# Patient Record
Sex: FEMALE | URBAN - METROPOLITAN AREA
[De-identification: names, ages, dates, MRNs, and addresses within clinical notes are randomized per-mention and may not be internally consistent; named-entity substitution may affect disease eponyms.]

---

## 2018-09-17 ENCOUNTER — APPOINTMENT (RX ONLY)
Dept: URBAN - METROPOLITAN AREA CLINIC 54 | Facility: CLINIC | Age: 33
Setting detail: DERMATOLOGY
End: 2018-09-17

## 2018-09-17 DIAGNOSIS — Q826 OTHER SPECIFIED ANOMALIES OF SKIN: ICD-10-CM

## 2018-09-17 DIAGNOSIS — Q819 OTHER SPECIFIED ANOMALIES OF SKIN: ICD-10-CM

## 2018-09-17 DIAGNOSIS — Q828 OTHER SPECIFIED ANOMALIES OF SKIN: ICD-10-CM

## 2018-09-17 DIAGNOSIS — D485 NEOPLASM OF UNCERTAIN BEHAVIOR OF SKIN: ICD-10-CM

## 2018-09-17 PROBLEM — D48.5 NEOPLASM OF UNCERTAIN BEHAVIOR OF SKIN: Status: ACTIVE | Noted: 2018-09-17

## 2018-09-17 PROBLEM — Q82.8 OTHER SPECIFIED CONGENITAL MALFORMATIONS OF SKIN: Status: ACTIVE | Noted: 2018-09-17

## 2018-09-17 PROBLEM — L70.0 ACNE VULGARIS: Status: ACTIVE | Noted: 2018-09-17

## 2018-09-17 PROCEDURE — ? COUNSELING

## 2018-09-17 PROCEDURE — 99203 OFFICE O/P NEW LOW 30 MIN: CPT

## 2018-09-17 PROCEDURE — ? DEFER

## 2018-09-17 PROCEDURE — ? RECOMMENDATIONS

## 2018-09-17 PROCEDURE — ? OTHER

## 2018-09-17 ASSESSMENT — LOCATION SIMPLE DESCRIPTION DERM
LOCATION SIMPLE: RIGHT SHOULDER
LOCATION SIMPLE: LEFT UPPER ARM
LOCATION SIMPLE: RIGHT UPPER ARM
LOCATION SIMPLE: LEFT CHEEK
LOCATION SIMPLE: LEFT CHEEK

## 2018-09-17 ASSESSMENT — LOCATION DETAILED DESCRIPTION DERM
LOCATION DETAILED: LEFT ANTERIOR PROXIMAL UPPER ARM
LOCATION DETAILED: RIGHT ANTERIOR PROXIMAL UPPER ARM
LOCATION DETAILED: RIGHT ANTERIOR SHOULDER
LOCATION DETAILED: LEFT INFERIOR MEDIAL MALAR CHEEK
LOCATION DETAILED: LEFT INFERIOR CENTRAL MALAR CHEEK
LOCATION DETAILED: LEFT INFERIOR MEDIAL MALAR CHEEK

## 2018-09-17 ASSESSMENT — LOCATION ZONE DERM
LOCATION ZONE: FACE
LOCATION ZONE: ARM
LOCATION ZONE: FACE

## 2018-09-17 NOTE — PROCEDURE: DEFER
Procedure To Be Performed At Next Visit: Biopsy by shave method
Detail Level: Detailed
Instructions (Optional): Left Superior Nasolabial fold  Size 0.3x0.3cm  Erythematous Papule R/O DN \\nLeft Medial Cheek 0.4x0.4cm Erythematous Papule R/O PG

## 2018-09-17 NOTE — HPI: SKIN LESION
How Severe Is Your Skin Lesion?: moderate
Has Your Skin Lesion Been Treated?: not been treated
Is This A New Presentation, Or A Follow-Up?: Growths
Which Family Member (Optional)?: Brother

## 2018-09-17 NOTE — PROCEDURE: OTHER
Detail Level: Zone
Note Text (......Xxx Chief Complaint.): This diagnosis correlates with the
Other (Free Text): Patient is currently pregnant, will do biopsy in 2 months after she delivers

## 2018-12-10 ENCOUNTER — APPOINTMENT (RX ONLY)
Dept: URBAN - METROPOLITAN AREA CLINIC 54 | Facility: CLINIC | Age: 33
Setting detail: DERMATOLOGY
End: 2018-12-10

## 2018-12-10 DIAGNOSIS — D485 NEOPLASM OF UNCERTAIN BEHAVIOR OF SKIN: ICD-10-CM

## 2018-12-10 DIAGNOSIS — D22 MELANOCYTIC NEVI: ICD-10-CM

## 2018-12-10 PROBLEM — D22.9 MELANOCYTIC NEVI, UNSPECIFIED: Status: ACTIVE | Noted: 2018-12-10

## 2018-12-10 PROBLEM — D48.5 NEOPLASM OF UNCERTAIN BEHAVIOR OF SKIN: Status: ACTIVE | Noted: 2018-12-10

## 2018-12-10 PROCEDURE — ? BIOPSY BY SHAVE METHOD

## 2018-12-10 PROCEDURE — 11100: CPT

## 2018-12-10 PROCEDURE — ? COUNSELING

## 2018-12-10 PROCEDURE — 11101: CPT

## 2018-12-10 PROCEDURE — 99213 OFFICE O/P EST LOW 20 MIN: CPT | Mod: 25

## 2018-12-10 ASSESSMENT — LOCATION DETAILED DESCRIPTION DERM
LOCATION DETAILED: LEFT INFERIOR MEDIAL MALAR CHEEK
LOCATION DETAILED: LEFT INFERIOR CENTRAL MALAR CHEEK

## 2018-12-10 ASSESSMENT — LOCATION SIMPLE DESCRIPTION DERM: LOCATION SIMPLE: LEFT CHEEK

## 2018-12-10 ASSESSMENT — LOCATION ZONE DERM: LOCATION ZONE: FACE

## 2018-12-10 NOTE — PROCEDURE: MIPS QUALITY
Detail Level: Detailed
Quality 402: Tobacco Use And Help With Quitting Among Adolescents: Patient screened for tobacco and never smoked
Quality 110: Preventive Care And Screening: Influenza Immunization: Influenza Immunization not Administered due to Patient Allergy.
Quality 431: Preventive Care And Screening: Unhealthy Alcohol Use - Screening: Patient screened for unhealthy alcohol use using a single question and scores less than 2 times per year

## 2018-12-10 NOTE — PROCEDURE: BIOPSY BY SHAVE METHOD
Cryotherapy Text: The wound bed was treated with cryotherapy after the biopsy was performed.
Depth Of Biopsy: dermis
Wound Care: Bacitracin
Lab: -[id number]
Size Of Lesion In Cm: 0.3
Consent: Written consent was obtained and risks were reviewed including but not limited to scarring, infection, bleeding, scabbing, incomplete removal, nerve damage and allergy to anesthesia.
Render Post-Care Instructions In Note?: no
Anesthesia Volume In Cc (Will Not Render If 0): 1
Biopsy Type: H and E
Body Location Override (Optional - Billing Will Still Be Based On Selected Body Map Location If Applicable): left Superior nasolabial fold
Electrodesiccation Text: The wound bed was treated with electrodesiccation after the biopsy was performed.
Type Of Destruction Used: Curettage
Electrodesiccation And Curettage Text: The wound bed was treated with electrodesiccation and curettage after the biopsy was performed.
Path Notes (To The Dermatopathologist): 0. 3x0.3cm  erythematous papule R/O DN
Billing Type: [employer]
Detail Level: Detailed
Dressing: bandage
Was A Bandage Applied: Yes
Hemostasis: Electrocautery
Biopsy Method: Dermablade
Curettage Text: The wound bed was treated with curettage after the biopsy was performed.
Post-Care Instructions: I reviewed with the patient in detail post-care instructions. Patient is to keep the biopsy site dry overnight, and then apply bacitracin twice daily until healed. Patient may apply hydrogen peroxide soaks to remove any crusting.
Silver Nitrate Text: The wound bed was treated with silver nitrate after the biopsy was performed.
Anesthesia Type: 1% lidocaine with epinephrine
Notification Instructions: Patient will be notified of biopsy results. However, patient instructed to call the office if not contacted within 2 weeks.
Additional Anesthesia Volume In Cc (Will Not Render If 0): 0
Body Location Override (Optional - Billing Will Still Be Based On Selected Body Map Location If Applicable): left Medial cheek
Path Notes (To The Dermatopathologist): 0.4x0.4cm erythematous papule R/O PG
Billing Type: Third-Party Bill
Size Of Lesion In Cm: 0.4

## 2021-04-23 ENCOUNTER — OFFICE VISIT (OUTPATIENT)
Dept: PRIMARY CARE CLINIC | Age: 36
End: 2021-04-23
Payer: MEDICAID

## 2021-04-23 VITALS
WEIGHT: 254 LBS | BODY MASS INDEX: 38.49 KG/M2 | SYSTOLIC BLOOD PRESSURE: 130 MMHG | DIASTOLIC BLOOD PRESSURE: 78 MMHG | HEIGHT: 68 IN | TEMPERATURE: 98 F | OXYGEN SATURATION: 97 % | HEART RATE: 86 BPM

## 2021-04-23 DIAGNOSIS — E03.9 HYPOTHYROIDISM, UNSPECIFIED TYPE: Primary | ICD-10-CM

## 2021-04-23 PROCEDURE — 99213 OFFICE O/P EST LOW 20 MIN: CPT | Performed by: PHYSICIAN ASSISTANT

## 2021-04-23 RX ORDER — LEVOTHYROXINE SODIUM 0.07 MG/1
75 TABLET ORAL DAILY
Qty: 30 TABLET | Refills: 0 | Status: SHIPPED | OUTPATIENT
Start: 2021-04-23 | End: 2021-05-13

## 2021-04-23 RX ORDER — LEVOTHYROXINE SODIUM 0.07 MG/1
75 TABLET ORAL DAILY
COMMUNITY
End: 2021-04-23

## 2021-04-23 RX ORDER — VENLAFAXINE HYDROCHLORIDE 75 MG/1
CAPSULE, EXTENDED RELEASE ORAL
COMMUNITY
Start: 2021-04-12 | End: 2021-07-13 | Stop reason: SDUPTHER

## 2021-04-23 ASSESSMENT — ENCOUNTER SYMPTOMS
GASTROINTESTINAL NEGATIVE: 1
RESPIRATORY NEGATIVE: 1

## 2021-04-23 NOTE — PROGRESS NOTES
[address]   [address] [place] [address]  Phone: [phone]  Fax: [phone]       [facility] Name: Zulay Wilder  MRN: [id number]  [place] 1985  Date of evaluation: 4/23/2021  Provider: Susan Mccracken PA-C     CHIEF COMPLAINT       Chief Complaint   Patient presents with    Hypothyroidism     Needs Levothyroxine refilled-  75mcg daily           HISTORY OF PRESENT ILLNESS  (Location/Symptom, Timing/Onset, Context/Setting, Quality, Duration, Modifying Factors, Severity.)   Zulay Wilder is a 29 y.o. White [1] female who presents to the office for evaluation of      Medication refill. Patient is transitioning care and establishing care with Dr. Luis Felipe Campuzano on 4/28/21. Patient is currently out of thyroid medication. Patient states that prior to this the medication and dose seems to be effective. Nursing Notes were reviewed. REVIEW OF SYSTEMS    (2-9 systems for level 4, 10 or more for level 5)     Review of Systems   Constitutional: Positive for fatigue. HENT: Negative. Respiratory: Negative. Cardiovascular: Negative. Gastrointestinal: Negative. Genitourinary: Negative. Musculoskeletal:        Feels \"Achey\" due to being off thyroid medication. Except as noted above the remainder of the review of systems was reviewed andnegative. PAST MEDICAL HISTORY   History reviewed. No past medical history on file. SURGICAL HISTORY     History reviewed. No past surgical history on file. CURRENT MEDICATIONS       Current Outpatient Medications   Medication Sig Dispense Refill    venlafaxine (EFFEXOR XR) 75 MG extended release capsule TAKE OEN CAPSULE BY MOUTH WITH FOOD ONCE DAILY      levothyroxine (SYNTHROID) 75 MCG tablet Take 1 tablet by mouth daily 30 tablet 0     No current facility-administered medications for this visit. ALLERGIES     Shellfish-derived products    FAMILY HISTORY     No family history on file.   No family status information on file. SOCIAL HISTORY      reports that she has never smoked. She has never used smokeless tobacco.      PHYSICAL EXAM    (up to 7 for level 4, 8 or more for level 5)     Vitals:    04/23/21 1601   BP: 130/78   Site: Left Upper Arm   Position: Supine   Cuff Size: Large Adult   Pulse: 86   Temp: 98 °F (36.7 °C)   SpO2: 97%   Weight: 254 lb (115.2 kg)   Height: 5' 8\" (1.727 m)         Physical Exam  Vitals signs and nursing note reviewed. Constitutional:       Appearance: Normal appearance. She is normal weight. HENT:      Head: Normocephalic and atraumatic. Right Ear: External ear normal.      Left Ear: External ear normal.      Nose: Nose normal.      Mouth/Throat:      Mouth: Mucous membranes are moist.   Eyes:      Extraocular Movements: Extraocular movements intact. Conjunctiva/sclera: Conjunctivae normal.      Pupils: Pupils are equal, round, and reactive to light. Cardiovascular:      Rate and Rhythm: Normal rate. Pulmonary:      Effort: Pulmonary effort is normal.   Abdominal:      Palpations: Abdomen is soft. Skin:     General: Skin is warm and dry. Neurological:      Mental Status: She is alert and oriented to person, place, and time. DIFFERENTIAL DIAGNOSIS:       Cathie York reviewed the disposition diagnosis with the patient and or their family/guardian. I have answered their questions and given discharge instructions. They voiced understanding of these instructions and did not have anyfurther questions or complaints. PROCEDURES:  No orders of the defined types were placed in this encounter. No results found for this visit on 04/23/21.     FINALIMPRESSION      Visit Diagnoses and Associated Orders     Hypothyroidism, unspecified type    -  Primary         ORDERS WITHOUT AN ASSOCIATED DIAGNOSIS    venlafaxine (EFFEXOR XR) 75 MG extended release capsule [56668]      levothyroxine (SYNTHROID) 75 MCG tablet [4422]              PLAN Return if symptoms worsen or fail to improve. DISCHARGEMEDICATIONS:  Orders Placed This Encounter   Medications    levothyroxine (SYNTHROID) 75 MCG tablet     Sig: Take 1 tablet by mouth daily     Dispense:  30 tablet     Refill:  0         Plan:  1. Patient to take medication as prescribed  2. Patient to establish care with Dr. Windsor Brunner on 04/28/2021  3. Patient instructed to return to the office if symptoms worsen, return, or have any other concerns. 4. Patient understands and is agreeable.          Nato Howell PA-C 4/23/2021 5:26 PM

## 2021-04-23 NOTE — PATIENT INSTRUCTIONS
Patient Education        Hypothyroidism: Care Instructions  Your Care Instructions     When you have hypothyroidism, your body doesn't make enough thyroid hormone. This hormone helps your body use energy. If your thyroid level is low, you may feel tired, be constipated, have an increase in your blood pressure, or have dry skin or memory problems. You may also get cold easily, even when it is warm. Women with low thyroid levels may have heavy menstrual periods. A blood test to find your thyroid-stimulating hormone (TSH) level is used to check for hypothyroidism. A high TSH level may mean that you have it. The treatment for hypothyroidism is thyroid hormone pills. You should start to feel better in 1 to 2 weeks. Most people need treatment for the rest of their lives. You will need regular visits with your doctor to make sure you are doing well and that you have the right dose of medicine. Follow-up care is a key part of your treatment and safety. Be sure to make and go to all appointments, and call your doctor if you are having problems. It's also a good idea to know your test results and keep a list of the medicines you take. How can you care for yourself at home? · Take your thyroid hormone medicine exactly as prescribed. Call your doctor if you think you are having a problem with your medicine. Most people do not have side effects if they take the right amount of medicine regularly. ? Take the medicine 30 minutes before breakfast, and do not take it with calcium, vitamins, or iron. ? Do not take extra doses of your thyroid medicine. It will not help you get better any faster, and it may cause side effects. ? If you forget to take a dose, do NOT take a double dose of medicine. Take your usual dose the next day. · Tell your doctor about all prescription, herbal, or over-the-counter products you take. · Take care of yourself. Eat a healthy diet, get enough sleep, and get regular exercise.   When should you call for help? Call 911 anytime you think you may need emergency care. For example, call if:    · You passed out (lost consciousness).     · You have severe trouble breathing.     · You have a very slow heartbeat (less than 60 beats a minute).     · You have a low body temperature (95°F or below). Call your doctor now or seek immediate medical care if:    · You feel tired, sluggish, or weak.     · You have trouble remembering things or concentrating.     · You do not begin to feel better 2 weeks after starting your medicine. Watch closely for changes in your health, and be sure to contact your doctor if you have any problems. Where can you learn more? Go to https://[employer]. org and sign in to your [employer] account. Enter R840 in the [employer] box to learn more about \"Hypothyroidism: Care Instructions. \"     If you do not have an account, please click on the \"Sign Up Now\" link. Current as of: March 31, 2020               Content Version: 12.8  © 5324-1961 Healthwise, Incorporated. Care instructions adapted under license by [facility] [place] ([facility]). If you have questions about a medical condition or this instruction, always ask your healthcare professional. [address] any warranty or liability for your use of this information.

## 2021-04-30 ENCOUNTER — TELEPHONE (OUTPATIENT)
Dept: FAMILY MEDICINE CLINIC | Age: 36
End: 2021-04-30

## 2021-05-07 ENCOUNTER — HOSPITAL ENCOUNTER (EMERGENCY)
Age: 36
Discharge: HOME | End: 2021-05-07
Attending: EMERGENCY MEDICINE
Payer: MEDICAID

## 2021-05-07 VITALS
WEIGHT: 250 LBS | DIASTOLIC BLOOD PRESSURE: 90 MMHG | BODY MASS INDEX: 37.89 KG/M2 | HEIGHT: 68 IN | RESPIRATION RATE: 16 BRPM | SYSTOLIC BLOOD PRESSURE: 153 MMHG | HEART RATE: 65 BPM | OXYGEN SATURATION: 100 % | TEMPERATURE: 97.8 F

## 2021-05-07 DIAGNOSIS — N92.0 MENORRHAGIA WITH REGULAR CYCLE: Primary | ICD-10-CM

## 2021-05-07 LAB
-: ABNORMAL
ABSOLUTE EOS #: 0.3 K/UL (ref 0–0.4)
ABSOLUTE IMMATURE GRANULOCYTE: ABNORMAL K/UL (ref 0–0.3)
ABSOLUTE LYMPH #: 3.4 K/UL (ref 1–4.8)
ABSOLUTE MONO #: 0.6 K/UL (ref 0.1–1.2)
AMORPHOUS: ABNORMAL
BACTERIA: ABNORMAL
BASOPHILS # BLD: 1 % (ref 0–2)
BASOPHILS ABSOLUTE: 0.1 K/UL (ref 0–0.2)
BILIRUBIN URINE: NEGATIVE
CASTS UA: ABNORMAL /LPF
COLOR: YELLOW
COMMENT UA: ABNORMAL
CRYSTALS, UA: ABNORMAL /HPF
DIFFERENTIAL TYPE: ABNORMAL
EOSINOPHILS RELATIVE PERCENT: 3 % (ref 1–4)
EPITHELIAL CELLS UA: ABNORMAL /HPF (ref 0–5)
GLUCOSE URINE: NEGATIVE
HCG(URINE) PREGNANCY TEST: NEGATIVE
HCT VFR BLD CALC: 35.3 % (ref 36–46)
HEMOGLOBIN: 11.9 G/DL (ref 12–16)
IMMATURE GRANULOCYTES: ABNORMAL %
KETONES, URINE: NEGATIVE
LEUKOCYTE ESTERASE, URINE: NEGATIVE
LYMPHOCYTES # BLD: 34 % (ref 24–44)
MCH RBC QN AUTO: 28.6 PG (ref 26–34)
MCHC RBC AUTO-ENTMCNC: 33.6 G/DL (ref 31–37)
MCV RBC AUTO: 85 FL (ref 80–100)
MONOCYTES # BLD: 6 % (ref 2–11)
MUCUS: ABNORMAL
NITRITE, URINE: NEGATIVE
NRBC AUTOMATED: ABNORMAL PER 100 WBC
OTHER OBSERVATIONS UA: ABNORMAL
PDW BLD-RTO: 12.9 % (ref 12.5–15.4)
PH UA: 7 (ref 5–8)
PLATELET # BLD: 321 K/UL (ref 140–450)
PLATELET ESTIMATE: ABNORMAL
PMV BLD AUTO: 7.6 FL (ref 6–12)
PROTEIN UA: NEGATIVE
RBC # BLD: 4.16 M/UL (ref 4–5.2)
RBC # BLD: ABNORMAL 10*6/UL
RBC UA: ABNORMAL /HPF (ref 0–2)
RENAL EPITHELIAL, UA: ABNORMAL /HPF
SEG NEUTROPHILS: 56 % (ref 36–66)
SEGMENTED NEUTROPHILS ABSOLUTE COUNT: 5.7 K/UL (ref 1.8–7.7)
SPECIFIC GRAVITY UA: 1.01 (ref 1–1.03)
TRICHOMONAS: ABNORMAL
TURBIDITY: CLEAR
URINE HGB: ABNORMAL
UROBILINOGEN, URINE: NORMAL
WBC # BLD: 10.2 K/UL (ref 3.5–11)
WBC # BLD: ABNORMAL 10*3/UL
WBC UA: ABNORMAL /HPF (ref 0–5)
YEAST: ABNORMAL

## 2021-05-07 PROCEDURE — 36415 COLL VENOUS BLD VENIPUNCTURE: CPT

## 2021-05-07 PROCEDURE — 85025 COMPLETE CBC W/AUTO DIFF WBC: CPT

## 2021-05-07 PROCEDURE — 81025 URINE PREGNANCY TEST: CPT

## 2021-05-07 PROCEDURE — 81001 URINALYSIS AUTO W/SCOPE: CPT

## 2021-05-07 PROCEDURE — 6370000000 HC RX 637 (ALT 250 FOR IP): Performed by: EMERGENCY MEDICINE

## 2021-05-07 PROCEDURE — 99285 EMERGENCY DEPT VISIT HI MDM: CPT

## 2021-05-07 RX ORDER — IBUPROFEN 600 MG/1
600 TABLET ORAL EVERY 6 HOURS PRN
Qty: 30 TABLET | Refills: 0 | Status: SHIPPED | OUTPATIENT
Start: 2021-05-07 | End: 2021-05-11

## 2021-05-07 RX ORDER — OXYCODONE HYDROCHLORIDE AND ACETAMINOPHEN 5; 325 MG/1; MG/1
1 TABLET ORAL ONCE
Status: COMPLETED | OUTPATIENT
Start: 2021-05-07 | End: 2021-05-07

## 2021-05-07 RX ADMIN — OXYCODONE HYDROCHLORIDE AND ACETAMINOPHEN 1 TABLET: 5; 325 TABLET ORAL at 20:44

## 2021-05-07 ASSESSMENT — PAIN SCALES - GENERAL
PAINLEVEL_OUTOF10: 4
PAINLEVEL_OUTOF10: 10
PAINLEVEL_OUTOF10: 10

## 2021-05-07 ASSESSMENT — PAIN DESCRIPTION - ORIENTATION: ORIENTATION: LEFT;RIGHT

## 2021-05-07 ASSESSMENT — ENCOUNTER SYMPTOMS
GASTROINTESTINAL NEGATIVE: 1
ALLERGIC/IMMUNOLOGIC NEGATIVE: 1
EYES NEGATIVE: 1
RESPIRATORY NEGATIVE: 1

## 2021-05-07 ASSESSMENT — PAIN DESCRIPTION - DESCRIPTORS: DESCRIPTORS: ACHING

## 2021-05-07 ASSESSMENT — PAIN DESCRIPTION - FREQUENCY: FREQUENCY: CONTINUOUS

## 2021-05-07 ASSESSMENT — PAIN - FUNCTIONAL ASSESSMENT: PAIN_FUNCTIONAL_ASSESSMENT: PREVENTS OR INTERFERES SOME ACTIVE ACTIVITIES AND ADLS

## 2021-05-07 ASSESSMENT — PAIN DESCRIPTION - PAIN TYPE: TYPE: ACUTE PAIN

## 2021-05-08 NOTE — ED PROVIDER NOTES
eMERGENCY dEPARTMENT eNCOUnter      Pt Name: Mamie Bloch  MRN: [id number]  [place] 1985  Date of evaluation: 5/7/2021      CHIEF COMPLAINT       Chief Complaint   Patient presents with    Vaginal Bleeding          HISTORY OF PRESENT ILLNESS    Mamie Bloch is a 29 y.o. female who presents emergency department for evaluation of cramping secondary to a heavy period. Patient states that she has been on her menses for this is the third day she states that she has been bleeding quite a bit she is going through about a pad an hour she states. Denies dizziness just does not feel well. Has no fevers no chills is not concerned about an STD. REVIEW OF SYSTEMS       Review of Systems   Constitutional: Negative. HENT: Negative. Eyes: Negative. Respiratory: Negative. Cardiovascular: Negative. Gastrointestinal: Negative. Endocrine: Negative. Genitourinary: Positive for menstrual problem. Musculoskeletal: Negative. Skin: Negative. Allergic/Immunologic: Negative. Neurological: Negative. Hematological: Negative. Psychiatric/Behavioral: Negative. PAST MEDICAL HISTORY    has a past medical history of Thyroid disease. SURGICAL HISTORY      has no past surgical history on file. CURRENT MEDICATIONS       Previous Medications    LEVOTHYROXINE (SYNTHROID) 75 MCG TABLET    Take 1 tablet by mouth daily    VENLAFAXINE (EFFEXOR XR) 75 MG EXTENDED RELEASE CAPSULE    TAKE OEN CAPSULE BY MOUTH WITH FOOD ONCE DAILY       ALLERGIES     is allergic to shellfish-derived products. FAMILY HISTORY     has no family status information on file. family history is not on file. SOCIAL HISTORY      reports that she has never smoked. She has never used smokeless tobacco. She reports previous alcohol use. She reports that she does not use drugs. PHYSICAL EXAM     INITIAL VITALS:  height is 5' 8\" (1.727 m) and weight is 113.4 kg (250 lb).  Her oral temperature is 97.7 °F (36.5 °C). Her blood pressure is 161/116 (abnormal) and her pulse is 66. Her respiration is 18 and oxygen saturation is 100%. Constitutional: Alert, oriented x3, nontoxic, afebrile, answering questions appropriately, acting properly for age, in no acute distress  HEENT: Extraocular muscles intact, mucus membranes moist, TMs clear bilaterally, no posterior pharyngeal erythema or exudates, Pupils equal, round, reactive to light,   Neck: Trachea midline, Supple without lymphadenopathy, no posterior midline neck tenderness to palpation  Cardiovascular: Regular rhythm and rate no S3, S4, or murmurs  Respiratory: Clear to auscultation bilaterally no wheezes, rhonchi, rales, no respiratory distress  Gastrointestinal: Soft, nontender, nondistended, positive bowel sounds. No rebound, rigidity, or guarding. Musculoskeletal: No extremity pain or swelling  Neurologic: Moving all 4 extremities without difficulty there are no gross focal neurologic deficits  Skin: Warm and dry        DIFFERENTIAL DIAGNOSIS/ MDM:     Most likely menorrhagia patient will get a CBC of pelvic exam urinalysis pregnancy test and be reevaluated. We will give her something for cramping as well.     DIAGNOSTIC RESULTS     EKG: All EKG's are interpreted by the Emergency Department Physician who either signs or Co-signs this chart in the absence of a cardiologist.        Not indicated unless otherwise documented above    LABS:  Results for orders placed or performed during the hospital encounter of 05/07/21   CBC Auto Differential   Result Value Ref Range    WBC 10.2 3.5 - 11.0 k/uL    RBC 4.16 4.0 - 5.2 m/uL    Hemoglobin 11.9 (L) 12.0 - 16.0 g/dL    Hematocrit 35.3 (L) 36 - 46 %    MCV 85.0 80 - 100 fL    MCH 28.6 26 - 34 pg    MCHC 33.6 31 - 37 g/dL    RDW 12.9 12.5 - 15.4 %    Platelets 691 609 - 482 k/uL    MPV 7.6 6.0 - 12.0 fL    NRBC Automated NOT REPORTED per 100 WBC    Differential Type NOT REPORTED     Seg Neutrophils 56 36 - 66 %    Lymphocytes 34 24 - 44 %    Monocytes 6 2 - 11 %    Eosinophils % 3 1 - 4 %    Basophils 1 0 - 2 %    Immature Granulocytes NOT REPORTED 0 %    Segs Absolute 5.70 1.8 - 7.7 k/uL    Absolute Lymph # 3.40 1.0 - 4.8 k/uL    Absolute Mono # 0.60 0.1 - 1.2 k/uL    Absolute Eos # 0.30 0.0 - 0.4 k/uL    Basophils Absolute 0.10 0.0 - 0.2 k/uL    Absolute Immature Granulocyte NOT REPORTED 0.00 - 0.30 k/uL    WBC Morphology NOT REPORTED     RBC Morphology NOT REPORTED     Platelet Estimate NOT REPORTED    Pregnancy, Urine   Result Value Ref Range    HCG(Urine) Pregnancy Test NEGATIVE NEGATIVE   Urinalysis Reflex to Culture    Specimen: Urine, clean catch   Result Value Ref Range    Color, UA YELLOW YELLOW    Turbidity UA CLEAR CLEAR    Glucose, Ur NEGATIVE NEGATIVE    Bilirubin Urine NEGATIVE NEGATIVE    Ketones, Urine NEGATIVE NEGATIVE    Specific Gravity, UA 1.015 1.005 - 1.030    Urine Hgb LARGE (A) NEGATIVE    pH, UA 7.0 5.0 - 8.0    Protein, UA NEGATIVE NEGATIVE    Urobilinogen, Urine Normal Normal    Nitrite, Urine NEGATIVE NEGATIVE    Leukocyte Esterase, Urine NEGATIVE NEGATIVE    Urinalysis Comments NOT REPORTED    Microscopic Urinalysis   Result Value Ref Range    -          WBC, UA 2 TO 5 0 - 5 /HPF    RBC, UA 10 TO 20 0 - 2 /HPF    Casts UA NOT REPORTED /LPF    Crystals, UA NOT REPORTED None /HPF    Epithelial Cells UA 2 TO 5 0 - 5 /HPF    Renal Epithelial, UA NOT REPORTED 0 /HPF    Bacteria, UA None None    Mucus, UA NOT REPORTED None    Trichomonas, UA NOT REPORTED None    Amorphous, UA NOT REPORTED None    Other Observations UA (A) NOT REQ. Utilizing a urinalysis as the only screening method to exclude a potential uropathogen can be unreliable in many patient populations. Rapid screening tests are less sensitive than culture and if UTI is a clinical possibility, culture should be considered despite a negative urinalysis.     Yeast, UA NOT REPORTED None       Not indicated unless otherwise documented above    RADIOLOGY:   I TO:  Gladys Villanueva MD  [address]  [phone]    In 2 days        DISCHARGE MEDICATIONS:  New Prescriptions    IBUPROFEN (ADVIL;MOTRIN) 600 MG TABLET    Take 1 tablet by mouth every 6 hours as needed for Pain       (Please note that portions of this note were completed with a voice recognition program.  Efforts were made to edit the dictations but occasionally words are mis-transcribed.)    Roman MD  Attending Emergency Physician           Leny Vee MD  05/07/21 5044

## 2021-05-10 ENCOUNTER — TELEPHONE (OUTPATIENT)
Dept: FAMILY MEDICINE CLINIC | Age: 36
End: 2021-05-10

## 2021-05-10 DIAGNOSIS — R10.2 PELVIC PAIN: Primary | ICD-10-CM

## 2021-05-10 DIAGNOSIS — N92.0 MENORRHAGIA WITH REGULAR CYCLE: ICD-10-CM

## 2021-05-10 NOTE — TELEPHONE ENCOUNTER
ED Follow up Call    Reason for ED visit:  pelvic pain, heavy bleeding  Status:     not changed    Did you call your PCP prior to going to the ED? Not Applicable      Did you receive a discharge instructions from the Emergency Room? Yes  Review of Instructions:     Understands what to report/when to return?:  Yes   Understands discharge instructions?:  Yes   Following discharge instructions?:  Yes   If not why? Are there any new complaints of pain? No  New Pain Meds? N/A    Constipation prophylaxis needed? N/A    If you have a wound is the dressing clean, dry, and intact? N/A  Understands wound care regimen? N/A    Are there any other complaints/concerns that you wish to tell your provider? Questioning fibroids    FU appts/Provider:    Future Appointments   Date Time Provider Brandon Smith   5/13/2021  8:30 AM MD Kailee Veronica [facility]           New Medications?:   No      Medication Reconciliation by phone - Yes  Understands Medications? Yes  Taking Medications? Yes  Can you swallow your pills? Yes    Any further needs in the home i.e. Equipment?   Not Applicable    Link to services in community?:  N/A   Which services:

## 2021-05-11 ENCOUNTER — OFFICE VISIT (OUTPATIENT)
Dept: FAMILY MEDICINE CLINIC | Age: 36
End: 2021-05-11
Payer: MEDICAID

## 2021-05-11 VITALS
HEIGHT: 68 IN | HEART RATE: 79 BPM | WEIGHT: 251 LBS | TEMPERATURE: 97.7 F | DIASTOLIC BLOOD PRESSURE: 82 MMHG | RESPIRATION RATE: 15 BRPM | OXYGEN SATURATION: 99 % | BODY MASS INDEX: 38.04 KG/M2 | SYSTOLIC BLOOD PRESSURE: 122 MMHG

## 2021-05-11 DIAGNOSIS — N93.9 ABNORMAL UTERINE BLEEDING: Primary | ICD-10-CM

## 2021-05-11 DIAGNOSIS — N92.0 MENORRHAGIA WITH REGULAR CYCLE: ICD-10-CM

## 2021-05-11 DIAGNOSIS — R10.2 PELVIC PAIN: ICD-10-CM

## 2021-05-11 PROCEDURE — 99203 OFFICE O/P NEW LOW 30 MIN: CPT | Performed by: STUDENT IN AN ORGANIZED HEALTH CARE EDUCATION/TRAINING PROGRAM

## 2021-05-11 RX ORDER — IBUPROFEN 800 MG/1
800 TABLET ORAL 2 TIMES DAILY PRN
Qty: 60 TABLET | Refills: 0 | Status: SHIPPED | OUTPATIENT
Start: 2021-05-11 | End: 2022-10-14 | Stop reason: SDUPTHER

## 2021-05-11 RX ORDER — TRAMADOL HYDROCHLORIDE 50 MG/1
50 TABLET ORAL EVERY 6 HOURS PRN
Qty: 28 TABLET | Refills: 0 | Status: SHIPPED | OUTPATIENT
Start: 2021-05-11 | End: 2021-05-18

## 2021-05-11 ASSESSMENT — ENCOUNTER SYMPTOMS
COUGH: 0
BACK PAIN: 0
SORE THROAT: 0
WHEEZING: 0
CONSTIPATION: 0
DIARRHEA: 0
ABDOMINAL PAIN: 0
SHORTNESS OF BREATH: 0
CHEST TIGHTNESS: 0
ABDOMINAL DISTENTION: 0

## 2021-05-11 ASSESSMENT — PATIENT HEALTH QUESTIONNAIRE - PHQ9
SUM OF ALL RESPONSES TO PHQ QUESTIONS 1-9: 0
1. LITTLE INTEREST OR PLEASURE IN DOING THINGS: 0
SUM OF ALL RESPONSES TO PHQ9 QUESTIONS 1 & 2: 0
2. FEELING DOWN, DEPRESSED OR HOPELESS: 0

## 2021-05-11 NOTE — PROGRESS NOTES
2021    Tonya Wall (:  1985) is a 29 y.o. female, here for evaluation of the following medical concerns:    HPI: Dysfunctional Uterine Bleeding: Patient complains of painful menses. She had been bleeding irregularly. She is now bleeding every 25 day and menses are lasting 4 days. Pad or tampon count: changes every 3 hours. Clots of size 4 cm. Dysmenorrhea: moderate, occurring throughout menses. Cyclic symptoms include bloating, fluid retention, pelvic pain and weight gain. Current contraception: none. History of infertility: no. History of abnormal Pap smear: no.    Review of Systems   Constitutional: Negative for chills, fatigue and fever. HENT: Negative for congestion, postnasal drip and sore throat. Eyes: Negative for visual disturbance. Respiratory: Negative for cough, chest tightness, shortness of breath and wheezing. Cardiovascular: Negative. Gastrointestinal: Negative for abdominal distention, abdominal pain, constipation and diarrhea. Genitourinary: Positive for menstrual problem, pelvic pain and vaginal bleeding. Negative for difficulty urinating, dysuria, frequency and urgency. Musculoskeletal: Negative for arthralgias, back pain and joint swelling. Skin: Negative for rash. Neurological: Negative for dizziness, weakness and light-headedness. Psychiatric/Behavioral: Negative for agitation, decreased concentration and sleep disturbance. Prior to Visit Medications    Medication Sig Taking? Authorizing Provider   ibuprofen (ADVIL;MOTRIN) 800 MG tablet Take 1 tablet by mouth 2 times daily as needed for Pain Yes Tatiana Tellez MD   traMADol (ULTRAM) 50 MG tablet Take 1 tablet by mouth every 6 hours as needed for Pain for up to 7 days. Intended supply: 7 days.  Take lowest dose possible to manage pain Yes Tatiana Tellez MD   venlafaxine (EFFEXOR XR) 75 MG extended release capsule TAKE OEN CAPSULE BY MOUTH WITH FOOD ONCE DAILY Yes Historical Provider, MD levothyroxine (SYNTHROID) 75 MCG tablet Take 1 tablet by mouth daily Yes Juana Cortez PA-C        Allergies   Allergen Reactions    Shellfish-Derived Products Hives       Past Medical History:   Diagnosis Date    Thyroid disease        Past Surgical History:   Procedure Laterality Date     SECTION         Social History     Socioeconomic History    Marital status: Single     Spouse name: Not on file    Number of children: Not on file    Years of education: Not on file    Highest education level: Not on file   Occupational History    Not on file   Social Needs    Financial resource strain: Not on file    Food insecurity     Worry: Not on file     Inability: Not on file    Transportation needs     Medical: Not on file     Non-medical: Not on file   Tobacco Use    Smoking status: Never Smoker    Smokeless tobacco: Never Used   Substance and Sexual Activity    Alcohol use: Not Currently    Drug use: Never    Sexual activity: Not Currently   Lifestyle    Physical activity     Days per week: Not on file     Minutes per session: Not on file    Stress: Not on file   Relationships    Social connections     Talks on phone: Not on file     Gets together: Not on file     Attends Oriental orthodox service: Not on file     Active member of club or organization: Not on file     Attends meetings of clubs or organizations: Not on file     Relationship status: Not on file    Intimate partner violence     Fear of current or ex partner: Not on file     Emotionally abused: Not on file     Physically abused: Not on file     Forced sexual activity: Not on file   Other Topics Concern    Not on file   Social History Narrative    Not on file        Family History   Problem Relation Age of Onset    High Blood Pressure Mother     Other Father        Vitals:    21 0826   BP: 122/82   Site: Right Upper Arm   Position: Sitting   Cuff Size: Large Adult   Pulse: 79   Resp: 15   Temp: 97.7 °F (36.5 °C)   TempSrc: Temporal   SpO2: 99%   Weight: 251 lb (113.9 kg)   Height: 5' 8\" (1.727 m)     Estimated body mass index is 38.16 kg/m² as calculated from the following:    Height as of this encounter: 5' 8\" (1.727 m). Weight as of this encounter: 251 lb (113.9 kg). Physical Exam  Vitals signs and nursing note reviewed. Constitutional:       Appearance: Normal appearance. HENT:      Head: Normocephalic and atraumatic. Eyes:      Extraocular Movements: Extraocular movements intact. Neck:      Musculoskeletal: Normal range of motion and neck supple. Cardiovascular:      Rate and Rhythm: Normal rate and regular rhythm. Pulses: Normal pulses. Heart sounds: Normal heart sounds. Pulmonary:      Effort: Pulmonary effort is normal.      Breath sounds: Normal breath sounds. Abdominal:      General: Abdomen is flat. Palpations: Abdomen is soft. Musculoskeletal: Normal range of motion. Skin:     General: Skin is warm. Neurological:      General: No focal deficit present. Mental Status: She is alert and oriented to person, place, and time. ASSESSMENT/PLAN:  1. Abnormal uterine bleeding  - US PELVIS COMPLETE NON-OB TRANSABDOMINAL AND TRANSVAGINAL; [facility]  - [address] [place] [address], Gynecology, [facility]  - ibuprofen (ADVIL;MOTRIN) 800 MG tablet; Take 1 tablet by mouth 2 times daily as needed for Pain  Dispense: 60 tablet; Refill: 0  - traMADol (ULTRAM) 50 MG tablet; Take 1 tablet by mouth every 6 hours as needed for Pain for up to 7 days. Intended supply: 7 days. Take lowest dose possible to manage pain  Dispense: 28 tablet; Refill: 0    2. Pelvic pain  - ibuprofen (ADVIL;MOTRIN) 800 MG tablet; Take 1 tablet by mouth 2 times daily as needed for Pain  Dispense: 60 tablet; Refill: 0  - traMADol (ULTRAM) 50 MG tablet; Take 1 tablet by mouth every 6 hours as needed for Pain for up to 7 days. Intended supply: 7 days. Take lowest dose possible to manage pain  Dispense: 28 tablet;  Refill: 0    3. Menorrhagia with regular cycle  - ibuprofen (ADVIL;MOTRIN) 800 MG tablet; Take 1 tablet by mouth 2 times daily as needed for Pain  Dispense: 60 tablet; Refill: 0  - traMADol (ULTRAM) 50 MG tablet; Take 1 tablet by mouth every 6 hours as needed for Pain for up to 7 days. Intended supply: 7 days. Take lowest dose possible to manage pain  Dispense: 28 tablet; Refill: 0      No follow-ups on file. An  electronic signature was used to authenticate this note.     --Roman Valdivia MD on 5/11/2021 at 8:59 AM

## 2021-05-13 RX ORDER — LEVOTHYROXINE SODIUM 0.07 MG/1
75 TABLET ORAL DAILY
Qty: 90 TABLET | Refills: 1 | Status: SHIPPED | OUTPATIENT
Start: 2021-05-13 | End: 2021-12-22 | Stop reason: SDUPTHER

## 2021-06-16 ENCOUNTER — HOSPITAL ENCOUNTER (EMERGENCY)
Facility: CLINIC | Age: 36
Discharge: HOME | End: 2021-06-17
Attending: EMERGENCY MEDICINE
Payer: MEDICAID

## 2021-06-16 VITALS
WEIGHT: 250 LBS | OXYGEN SATURATION: 100 % | TEMPERATURE: 99.2 F | RESPIRATION RATE: 18 BRPM | HEART RATE: 84 BPM | HEIGHT: 68 IN | SYSTOLIC BLOOD PRESSURE: 139 MMHG | BODY MASS INDEX: 37.89 KG/M2 | DIASTOLIC BLOOD PRESSURE: 91 MMHG

## 2021-06-16 DIAGNOSIS — R10.30 LOWER ABDOMINAL PAIN: Primary | ICD-10-CM

## 2021-06-16 LAB
-: ABNORMAL
ABSOLUTE EOS #: 0.2 K/UL (ref 0–0.4)
ABSOLUTE IMMATURE GRANULOCYTE: ABNORMAL K/UL (ref 0–0.3)
ABSOLUTE LYMPH #: 2.7 K/UL (ref 1–4.8)
ABSOLUTE MONO #: 0.5 K/UL (ref 0.1–1.2)
AMORPHOUS: ABNORMAL
BACTERIA: ABNORMAL
BASOPHILS # BLD: 1 % (ref 0–2)
BASOPHILS ABSOLUTE: 0.1 K/UL (ref 0–0.2)
BILIRUBIN URINE: NEGATIVE
CASTS UA: ABNORMAL /LPF (ref 0–2)
COLOR: YELLOW
COMMENT UA: ABNORMAL
CRYSTALS, UA: ABNORMAL /HPF
DIFFERENTIAL TYPE: ABNORMAL
EOSINOPHILS RELATIVE PERCENT: 2 % (ref 1–4)
EPITHELIAL CELLS UA: ABNORMAL /HPF (ref 0–5)
GLUCOSE URINE: NEGATIVE
HCG(URINE) PREGNANCY TEST: NEGATIVE
HCT VFR BLD CALC: 33.4 % (ref 36–46)
HEMOGLOBIN: 11.3 G/DL (ref 12–16)
IMMATURE GRANULOCYTES: ABNORMAL %
KETONES, URINE: NEGATIVE
LEUKOCYTE ESTERASE, URINE: ABNORMAL
LYMPHOCYTES # BLD: 26 % (ref 24–44)
MCH RBC QN AUTO: 29.1 PG (ref 26–34)
MCHC RBC AUTO-ENTMCNC: 33.9 G/DL (ref 31–37)
MCV RBC AUTO: 85.8 FL (ref 80–100)
MONOCYTES # BLD: 5 % (ref 2–11)
MUCUS: ABNORMAL
NITRITE, URINE: NEGATIVE
NRBC AUTOMATED: ABNORMAL PER 100 WBC
OTHER OBSERVATIONS UA: ABNORMAL
PDW BLD-RTO: 13 % (ref 12.5–15.4)
PH UA: 7.5 (ref 5–8)
PLATELET # BLD: 294 K/UL (ref 140–450)
PLATELET ESTIMATE: ABNORMAL
PMV BLD AUTO: 7.4 FL (ref 6–12)
PROTEIN UA: NEGATIVE
RBC # BLD: 3.9 M/UL (ref 4–5.2)
RBC # BLD: ABNORMAL 10*6/UL
RBC UA: ABNORMAL /HPF (ref 0–2)
RENAL EPITHELIAL, UA: ABNORMAL /HPF
SEG NEUTROPHILS: 66 % (ref 36–66)
SEGMENTED NEUTROPHILS ABSOLUTE COUNT: 7 K/UL (ref 1.8–7.7)
SPECIFIC GRAVITY UA: 1.02 (ref 1–1.03)
TRICHOMONAS: ABNORMAL
TURBIDITY: CLEAR
URINE HGB: NEGATIVE
UROBILINOGEN, URINE: NORMAL
WBC # BLD: 10.5 K/UL (ref 3.5–11)
WBC # BLD: ABNORMAL 10*3/UL
WBC UA: ABNORMAL /HPF (ref 0–5)
YEAST: ABNORMAL

## 2021-06-16 PROCEDURE — 81001 URINALYSIS AUTO W/SCOPE: CPT

## 2021-06-16 PROCEDURE — 85025 COMPLETE CBC W/AUTO DIFF WBC: CPT

## 2021-06-16 PROCEDURE — 80048 BASIC METABOLIC PNL TOTAL CA: CPT

## 2021-06-16 PROCEDURE — 6360000002 HC RX W HCPCS: Performed by: EMERGENCY MEDICINE

## 2021-06-16 PROCEDURE — 36415 COLL VENOUS BLD VENIPUNCTURE: CPT

## 2021-06-16 PROCEDURE — 2580000003 HC RX 258: Performed by: EMERGENCY MEDICINE

## 2021-06-16 PROCEDURE — 81025 URINE PREGNANCY TEST: CPT

## 2021-06-16 RX ORDER — 0.9 % SODIUM CHLORIDE 0.9 %
1000 INTRAVENOUS SOLUTION INTRAVENOUS ONCE
Status: COMPLETED | OUTPATIENT
Start: 2021-06-16 | End: 2021-06-17

## 2021-06-16 RX ORDER — KETOROLAC TROMETHAMINE 30 MG/ML
30 INJECTION, SOLUTION INTRAMUSCULAR; INTRAVENOUS ONCE
Status: COMPLETED | OUTPATIENT
Start: 2021-06-16 | End: 2021-06-16

## 2021-06-16 RX ORDER — ONDANSETRON 2 MG/ML
4 INJECTION INTRAMUSCULAR; INTRAVENOUS ONCE
Status: COMPLETED | OUTPATIENT
Start: 2021-06-16 | End: 2021-06-16

## 2021-06-16 RX ADMIN — SODIUM CHLORIDE 1000 ML: 9 INJECTION, SOLUTION INTRAVENOUS at 23:31

## 2021-06-16 RX ADMIN — KETOROLAC TROMETHAMINE 30 MG: 30 INJECTION, SOLUTION INTRAMUSCULAR; INTRAVENOUS at 23:32

## 2021-06-16 RX ADMIN — ONDANSETRON 4 MG: 2 INJECTION INTRAMUSCULAR; INTRAVENOUS at 23:32

## 2021-06-16 ASSESSMENT — PAIN SCALES - GENERAL
PAINLEVEL_OUTOF10: 10
PAINLEVEL_OUTOF10: 10

## 2021-06-16 ASSESSMENT — PAIN DESCRIPTION - LOCATION: LOCATION: ABDOMEN;PELVIS

## 2021-06-16 ASSESSMENT — PAIN DESCRIPTION - FREQUENCY: FREQUENCY: CONTINUOUS

## 2021-06-16 ASSESSMENT — PAIN DESCRIPTION - DESCRIPTORS: DESCRIPTORS: CRAMPING

## 2021-06-16 ASSESSMENT — PAIN DESCRIPTION - PAIN TYPE: TYPE: ACUTE PAIN

## 2021-06-17 ENCOUNTER — NURSE TRIAGE (OUTPATIENT)
Dept: OTHER | Facility: CLINIC | Age: 36
End: 2021-06-17

## 2021-06-17 ENCOUNTER — APPOINTMENT (OUTPATIENT)
Dept: CT IMAGING | Facility: CLINIC | Age: 36
End: 2021-06-17
Payer: MEDICAID

## 2021-06-17 VITALS
OXYGEN SATURATION: 100 % | DIASTOLIC BLOOD PRESSURE: 98 MMHG | TEMPERATURE: 97.8 F | SYSTOLIC BLOOD PRESSURE: 147 MMHG | BODY MASS INDEX: 37.89 KG/M2 | HEART RATE: 73 BPM | HEIGHT: 68 IN | RESPIRATION RATE: 16 BRPM | WEIGHT: 250 LBS

## 2021-06-17 DIAGNOSIS — R10.30 LOWER ABDOMINAL PAIN: Primary | ICD-10-CM

## 2021-06-17 LAB
ABSOLUTE EOS #: 0.2 K/UL (ref 0–0.4)
ABSOLUTE IMMATURE GRANULOCYTE: ABNORMAL K/UL (ref 0–0.3)
ABSOLUTE LYMPH #: 2.4 K/UL (ref 1–4.8)
ABSOLUTE MONO #: 0.6 K/UL (ref 0.1–1.2)
ALBUMIN SERPL-MCNC: 4.2 G/DL (ref 3.5–5.2)
ALBUMIN/GLOBULIN RATIO: 1.4 (ref 1–2.5)
ALP BLD-CCNC: 64 U/L (ref 35–104)
ALT SERPL-CCNC: 7 U/L (ref 5–33)
ANION GAP SERPL CALCULATED.3IONS-SCNC: 7 MMOL/L (ref 9–17)
ANION GAP SERPL CALCULATED.3IONS-SCNC: 9 MMOL/L (ref 9–17)
AST SERPL-CCNC: 11 U/L
BASOPHILS # BLD: 1 % (ref 0–2)
BASOPHILS ABSOLUTE: 0.1 K/UL (ref 0–0.2)
BILIRUB SERPL-MCNC: 0.3 MG/DL (ref 0.3–1.2)
BUN BLDV-MCNC: 16 MG/DL (ref 6–20)
BUN BLDV-MCNC: 17 MG/DL (ref 6–20)
BUN/CREAT BLD: ABNORMAL (ref 9–20)
BUN/CREAT BLD: ABNORMAL (ref 9–20)
CALCIUM SERPL-MCNC: 9.5 MG/DL (ref 8.6–10.4)
CALCIUM SERPL-MCNC: 9.7 MG/DL (ref 8.6–10.4)
CHLORIDE BLD-SCNC: 105 MMOL/L (ref 98–107)
CHLORIDE BLD-SCNC: 107 MMOL/L (ref 98–107)
CO2: 25 MMOL/L (ref 20–31)
CO2: 27 MMOL/L (ref 20–31)
CREAT SERPL-MCNC: 0.9 MG/DL (ref 0.5–0.9)
CREAT SERPL-MCNC: 0.9 MG/DL (ref 0.5–0.9)
DIFFERENTIAL TYPE: ABNORMAL
EOSINOPHILS RELATIVE PERCENT: 3 % (ref 1–4)
GFR AFRICAN AMERICAN: >60 ML/MIN
GFR AFRICAN AMERICAN: >60 ML/MIN
GFR NON-AFRICAN AMERICAN: >60 ML/MIN
GFR NON-AFRICAN AMERICAN: >60 ML/MIN
GFR SERPL CREATININE-BSD FRML MDRD: ABNORMAL ML/MIN/{1.73_M2}
GLUCOSE BLD-MCNC: 115 MG/DL (ref 70–99)
GLUCOSE BLD-MCNC: 92 MG/DL (ref 70–99)
HCT VFR BLD CALC: 35.5 % (ref 36–46)
HEMOGLOBIN: 11.8 G/DL (ref 12–16)
IMMATURE GRANULOCYTES: ABNORMAL %
LYMPHOCYTES # BLD: 27 % (ref 24–44)
MCH RBC QN AUTO: 28.7 PG (ref 26–34)
MCHC RBC AUTO-ENTMCNC: 33.3 G/DL (ref 31–37)
MCV RBC AUTO: 86 FL (ref 80–100)
MONOCYTES # BLD: 7 % (ref 2–11)
NRBC AUTOMATED: ABNORMAL PER 100 WBC
PDW BLD-RTO: 13.1 % (ref 12.5–15.4)
PLATELET # BLD: 304 K/UL (ref 140–450)
PLATELET ESTIMATE: ABNORMAL
PMV BLD AUTO: 7.4 FL (ref 6–12)
POTASSIUM SERPL-SCNC: 3.9 MMOL/L (ref 3.7–5.3)
POTASSIUM SERPL-SCNC: 4.7 MMOL/L (ref 3.7–5.3)
RBC # BLD: 4.13 M/UL (ref 4–5.2)
RBC # BLD: ABNORMAL 10*6/UL
SEG NEUTROPHILS: 62 % (ref 36–66)
SEGMENTED NEUTROPHILS ABSOLUTE COUNT: 5.6 K/UL (ref 1.8–7.7)
SODIUM BLD-SCNC: 139 MMOL/L (ref 135–144)
SODIUM BLD-SCNC: 141 MMOL/L (ref 135–144)
TOTAL PROTEIN: 7.1 G/DL (ref 6.4–8.3)
WBC # BLD: 8.8 K/UL (ref 3.5–11)
WBC # BLD: ABNORMAL 10*3/UL

## 2021-06-17 PROCEDURE — 36415 COLL VENOUS BLD VENIPUNCTURE: CPT

## 2021-06-17 PROCEDURE — 99284 EMERGENCY DEPT VISIT MOD MDM: CPT

## 2021-06-17 PROCEDURE — 80053 COMPREHEN METABOLIC PANEL: CPT

## 2021-06-17 PROCEDURE — 6370000000 HC RX 637 (ALT 250 FOR IP): Performed by: EMERGENCY MEDICINE

## 2021-06-17 PROCEDURE — 74176 CT ABD & PELVIS W/O CONTRAST: CPT

## 2021-06-17 PROCEDURE — 85025 COMPLETE CBC W/AUTO DIFF WBC: CPT

## 2021-06-17 RX ORDER — OXYCODONE HYDROCHLORIDE AND ACETAMINOPHEN 5; 325 MG/1; MG/1
1 TABLET ORAL EVERY 6 HOURS PRN
Qty: 10 TABLET | Refills: 0 | Status: SHIPPED | OUTPATIENT
Start: 2021-06-17 | End: 2021-06-20

## 2021-06-17 RX ORDER — OXYCODONE HYDROCHLORIDE AND ACETAMINOPHEN 5; 325 MG/1; MG/1
2 TABLET ORAL ONCE
Status: COMPLETED | OUTPATIENT
Start: 2021-06-17 | End: 2021-06-17

## 2021-06-17 RX ADMIN — OXYCODONE HYDROCHLORIDE AND ACETAMINOPHEN 2 TABLET: 5; 325 TABLET ORAL at 12:22

## 2021-06-17 ASSESSMENT — ENCOUNTER SYMPTOMS
DIARRHEA: 0
SORE THROAT: 0
SHORTNESS OF BREATH: 0
ABDOMINAL PAIN: 1
VOMITING: 0

## 2021-06-17 ASSESSMENT — PAIN SCALES - GENERAL
PAINLEVEL_OUTOF10: 9
PAINLEVEL_OUTOF10: 5
PAINLEVEL_OUTOF10: 5
PAINLEVEL_OUTOF10: 9

## 2021-06-17 ASSESSMENT — PAIN DESCRIPTION - LOCATION
LOCATION: ABDOMEN
LOCATION: ABDOMEN

## 2021-06-17 ASSESSMENT — PAIN DESCRIPTION - ONSET: ONSET: GRADUAL

## 2021-06-17 ASSESSMENT — PAIN DESCRIPTION - ORIENTATION: ORIENTATION: LOWER

## 2021-06-17 ASSESSMENT — PAIN DESCRIPTION - FREQUENCY: FREQUENCY: CONTINUOUS

## 2021-06-17 ASSESSMENT — PAIN DESCRIPTION - PAIN TYPE
TYPE: ACUTE PAIN
TYPE: ACUTE PAIN

## 2021-06-17 ASSESSMENT — PAIN DESCRIPTION - PROGRESSION: CLINICAL_PROGRESSION: NOT CHANGED

## 2021-06-17 ASSESSMENT — PAIN DESCRIPTION - DESCRIPTORS: DESCRIPTORS: SHARP;SHOOTING

## 2021-06-17 NOTE — TELEPHONE ENCOUNTER
Received call from Juvenal Meigs at [facility] with [employer] Complaint. Brief description of triage: Severe abdominal pain, Acute abdomen    Triage indicates for patient to go to ER now    Care advice provided, patient verbalizes understanding; denies any other questions or concerns; instructed to call back for any new or worsening symptoms. Attention Provider: Thank you for allowing me to participate in the care of your patient. The patient was connected to triage in response to information provided to the ECC. Please do not respond through this encounter as the response is not directed to a shared pool. Reason for Disposition   SEVERE abdominal pain (e.g., excruciating)    Answer Assessment - Initial Assessment Questions  1. LOCATION: \"Where does it hurt? \"       Left and right lower abdomen    2. RADIATION: \"Does the pain shoot anywhere else? \" (e.g., chest, back)      Yes, hurts more when standing and walking, feels like the pain is in her uterus and shoots to her back. Feels like she will fall t her knees. 2  3. ONSET: \"When did the pain begin? \" (e.g., minutes, hours or days ago)       1.5 months    4. SUDDEN: \"Gradual or sudden onset? \"      Sudden     5. PATTERN \"Does the pain come and go, or is it constant? \"     - If constant: \"Is it getting better, staying the same, or worsening? \"       (Note: Constant means the pain never goes away completely; most serious pain is constant and it progresses)      - If intermittent: \"How long does it last?\" \"Do you have pain now? \"      (Note: Intermittent means the pain goes away completely between bouts)      Constant    6. SEVERITY: \"How bad is the pain? \"  (e.g., Scale 1-10; mild, moderate, or severe)    - MILD (1-3): doesn't interfere with normal activities, abdomen soft and not tender to touch     - MODERATE (4-7): interferes with normal activities or awakens from sleep, tender to touch     - SEVERE (8-10): excruciating pain, doubled over, unable to do any normal activities       10 severe pain    7. RECURRENT SYMPTOM: \"Have you ever had this type of abdominal pain before? \" If so, ask: \"When was the last time? \" and \"What happened that time? \"       Denies    8. CAUSE: \"What do you think is causing the abdominal pain? \"      Unsure    9. RELIEVING/AGGRAVATING FACTORS: \"What makes it better or worse? \" (e.g., movement, antacids, bowel movement)      Movement    10. OTHER SYMPTOMS: \"Has there been any vomiting, diarrhea, constipation, or urine problems? \"        Vomiting last night    11. PREGNANCY: \"Is there any chance you are pregnant? \" \"When was your last menstrual period? \"        Unsure    Protocols used: ABDOMINAL PAIN - FEMALE-ADULT-[place]

## 2021-06-17 NOTE — ED NOTES
Walked to room 9, writer heard escalating voices from patient. Asked staff nurse Siomara Perdomo  outside of room if everything was ok. Dr. Alisson Carl at bedside explaining results to pt.       Britni Carrasco RN  06/17/21 7007

## 2021-06-17 NOTE — ED PROVIDER NOTES
[facility] ED  [address] [facility]  Phone: [phone] [address] [facility] ED  EMERGENCY DEPARTMENT ENCOUNTER      Pt Name: Tereso Cervantes  MRN: [id number]  Sophiegfrupert 1985  Date of evaluation: 6/17/2021  Provider: Elysia Camacho DO    CHIEF COMPLAINT       Chief Complaint   Patient presents with   [facility]     Seen here last night for same.  Abdominal Pain         HISTORY OF PRESENT ILLNESS   (Location/Symptom, Timing/Onset,Context/Setting, Quality, Duration, Modifying Factors, Severity)  Note limiting factors. Tereso Cervantes is a 29 y.o. female who presents to the emergency department for the evaluation of abdominal pain. Patient states she was seen here last night for the same thing. Patient states she has been having some lower crampy abdominal pain for approximately 1 month. She has no dysuria or hematuria, no nausea or vomiting or diarrhea. No blood in stool or dark tarry stool. Apparently her physician had ordered an outpatient test for her, however, she was not able to get it done for some reason. She came here last night she had blood work and a urine which was unremarkable. She felt like she needed to have some imaging because over the past 2 days this crampy pain has gotten worse. It is sharp in nature and radiates bilaterally to the flank. She called her primary care physician today who recommended she come back to the emergency department for reevaluation. Nursing Notes were reviewed. REVIEW OF SYSTEMS    (2-9systems for level 4, 10 or more for level 5)     Review of Systems   Constitutional: Negative for fever. HENT: Negative for sore throat. Respiratory: Negative for shortness of breath. Cardiovascular: Negative for chest pain. Gastrointestinal: Positive for abdominal pain. Negative for diarrhea and vomiting. Genitourinary: Negative for dysuria and hematuria. Skin: Negative for rash. Neurological: Negative for weakness.    All of Communication with Friends and Family:     Frequency of Social Gatherings with Friends and Family:     Attends Episcopalian Services:     Active Member of Clubs or Organizations:     Attends Club or Organization Meetings:     Marital Status:    Intimate Partner Violence:     Fear of Current or Ex-Partner:     Emotionally Abused:     Physically Abused:     Sexually Abused:        SCREENINGS    [place] Coma Scale  Eye Opening: Spontaneous  Best Verbal Response: Oriented  Best Motor Response: Obeys commands  Pati Coma Scale Score: 15        PHYSICAL EXAM    (up to 7 for level 4, 8 or more for level 5)     ED Triage Vitals [06/17/21 1158]   BP Temp Temp Source Pulse Resp SpO2 Height Weight   (!) 156/100 97.8 °F (36.6 °C) Oral 73 16 100 % 5' 8\" (1.727 m) 250 lb (113.4 kg)       Physical Exam  Vitals and nursing note reviewed. Constitutional:       General: She is not in acute distress. Appearance: Normal appearance. She is obese. She is not ill-appearing or toxic-appearing. HENT:      Head: Normocephalic and atraumatic. Nose: Nose normal. No congestion. Mouth/Throat:      Mouth: Mucous membranes are moist.   Eyes:      General:         Right eye: No discharge. Left eye: No discharge. Conjunctiva/sclera: Conjunctivae normal.   Cardiovascular:      Rate and Rhythm: Normal rate and regular rhythm. Pulses: Normal pulses. Heart sounds: Normal heart sounds. No murmur heard. Pulmonary:      Effort: Pulmonary effort is normal. No respiratory distress. Breath sounds: Normal breath sounds. No wheezing. Abdominal:      General: Abdomen is flat. Bowel sounds are normal. There is no distension. Palpations: Abdomen is soft. There is no pulsatile mass. Tenderness: There is abdominal tenderness in the suprapubic area. There is no right CVA tenderness, left CVA tenderness, guarding or rebound. Negative signs include Carlson's sign and McBurney's sign.       Hernia: No hernia is present. Musculoskeletal:         General: No deformity or signs of injury. Normal range of motion. Cervical back: Normal range of motion. Skin:     General: Skin is warm and dry. Capillary Refill: Capillary refill takes less than 2 seconds. Findings: No rash. Neurological:      General: No focal deficit present. Mental Status: She is alert. Mental status is at baseline. Motor: No weakness. Comments: Speaking normally. No facial asymmetry. Moving all 4 extremities. Normal gait. Psychiatric:         Mood and Affect: Mood normal.         EMERGENCY DEPARTMENT COURSE and DIFFERENTIAL DIAGNOSIS/MDM:   Vitals:    Vitals:    06/17/21 1158   BP: (!) 156/100   Pulse: 73   Resp: 16   Temp: 97.8 °F (36.6 °C)   TempSrc: Oral   SpO2: 100%   Weight: 113.4 kg (250 lb)   Height: 5' 8\" (1.727 m)       Patient presents to the emergency department with a complaint described above. Vital signs show slight hypertension. Physical exam reveals some mild crampy tenderness in the suprapubic region without any rigidity, guarding or peritoneal signs. I reviewed the medical record to see her labs and urine and urine pregnancy test from yesterday, all of which are grossly unremarkable.   At this time, I will repeat labs to compare with previous and I have ordered CT of the abdomen and pelvis without contrast.  I have low suspicion this represents an acute intra-abdominal infectious process, vascular abnormality or organ dysfunction, will reevaluate after pain meds, labs and CT      DIAGNOSTIC RESULTS     LABS:  Labs Reviewed   CBC WITH AUTO DIFFERENTIAL - Abnormal; Notable for the following components:       Result Value    Hemoglobin 11.8 (*)     Hematocrit 35.5 (*)     All other components within normal limits   COMPREHENSIVE METABOLIC PANEL - Abnormal; Notable for the following components:    Anion Gap 7 (*)     All other components within normal limits       All other labs were within normal range or not returned as of this dictation. RADIOLOGY:  CT ABDOMEN PELVIS WO CONTRAST Additional Contrast? None   Final Result   No acute abdominal or pelvic abnormality. ED Course as of Jun 17 1353   Thu Jun 17, 2021   1351 Skin unremarkable. Patient's pain is improved. She is going to follow-up with her PCP office, she is going to get referral to gynecology. I have explained that she will need further evaluation for this intermittent abdominal pain, we did talk about red flags and what to return to the ER for. At this time the patient is without objective evidence of an acute process requiring hospitalization or inpatient management. They have remained hemodynamically stable and are stable for discharge with outpatient follow-up. Standard anticipatory guidance given to patient upon discharge. Have given them a specific time frame in which to follow-up and who to follow-up with. I have also advised them that they should return to the emergency department if they get worse, or not getting better or develop any new or concerning symptoms. Patient demonstrates understanding.    [TS]      ED Course User Index  [TS] Beauty Mary,          PROCEDURES:  Unless otherwise noted below, none     Procedures    FINAL IMPRESSION      1. Lower abdominal pain          DISPOSITION/PLAN   DISPOSITION Decision To Discharge 06/17/2021 01:47:49 PM      PATIENT REFERRED TO:  Td Holman MD  [address]  [phone]    Call in 1 day        DISCHARGE MEDICATIONS:  New Prescriptions    OXYCODONE-ACETAMINOPHEN (PERCOCET) 5-325 MG PER TABLET    Take 1 tablet by mouth every 6 hours as needed for Pain for up to 3 days. Intended supply: 3 days.  Take lowest dose possible to manage pain          (Please note that portions of this note were completed with a voice recognition program.  Efforts were made to edit the dictations but occasionally words are mis-transcribed.) Harvey Morrow DO (electronically signed)  Board Certified Emergency Physician          Harvey Morrow DO  06/17/21 6162

## 2021-06-17 NOTE — ED NOTES
Mode of arrival (squad #, walk in, police, etc) : wheelchair, from home        Chief complaint(s): abdominal/pelvic pain        Arrival Note (brief scenario, treatment PTA, etc). : pt presented to the ED c/o abdominal/pelvic pain that started this morning. Pt states is feels like a cramping pain. Pt denies dysuria, denies vaginal discharge, denies fever and chills, denies N/V/D. Pt states she took 600 mg ibuprofen this morning. Pt states there is tenderness with palpation of pelvic area. Pt alert and oriented. C= \"Have you ever felt that you should Cut down on your drinking? \"  No  A= \"Have people Annoyed you by criticizing your drinking? \"  No  G= \"Have you ever felt bad or Guilty about your drinking? \"  No  E= \"Have you ever had a drink as an Eye-opener first thing in the morning to steady your nerves or to help a hangover? \"  No      Deferred []      Reason for deferring: N/A    *If yes to two or more: probable alcohol abuse. Michel Ferguson RN  06/16/21 3643

## 2021-06-17 NOTE — ED NOTES
RN went into room to discharge pt. Pt states she is very unhappy with the care here, stating \"you guys didn't do anything for my pain except take my blood and give me pain medication, if I knew that, I could have save myself time and money by drinking jeny freemanel at home and going to bed and then waking up to excrucitating pain again. \" Dr. Emily Khanna at bedside to explain to pt that we do not have ultrasound capability at our facility at this hour, that pt needs to call her PCP and let them know that she missed her ultrasound appointment last week, and if need be, to have them give her an order to get another ultrasound done. Dr Emily Khanna informed pt that with the blood work result tonight it does not indicate anything emergent, and that she needs to follow up with her PCP and get the ultrasound performed. Pt states \"this is ridiculous that you do not have ultrasound at night for emergencies, because I am in excruciating pain, and now I will have to wait another week for the ultrasound. \" Dr. Emily Khanna informed pt that ultrasound could be performed as an outpatient procedure, and she needs to make an appointment at her earliest convenience to get one done.      Zoë Sibley RN  06/17/21 0049

## 2021-06-18 NOTE — ED PROVIDER NOTES
alcohol use. She reports that she does not use drugs. PHYSICAL EXAM     INITIAL VITALS:  height is 5' 8\" (1.727 m) and weight is 250 lb (113.4 kg). Her oral temperature is 99.2 °F (37.3 °C). Her blood pressure is 139/91 (abnormal) and her pulse is 84. Her respiration is 18 and oxygen saturation is 100%.       General: Patient is alert nontoxic-appearing obese female in no apparent distress  HEENT: Head is atraumatic conjunctiva are clear mouth shows moist mucous membranes  Neck: Supple  Respiratory: Lung sounds are clear bilateral  Cardiac: Heart is regular rate and rhythm  GI: Abdomen soft tender in the lower quadrants/suprapubic region with no rebound guarding pulsatile mass or bruits bowel sounds are normal  Neuro: Patient has no gross focal neurological deficits at bedside exam    DIFFERENTIAL DIAGNOSIS/ MDM:     Appendicitis bowel obstruction UTI obtain CAT scan labs fibroids    DIAGNOSTIC RESULTS     EKG: All EKG's are interpreted by the Emergency Department Physician who either signs or Co-signs this chart in the absence of a cardiologist.        RADIOLOGY:   I directly visualized the following  images and reviewed the radiologist interpretations:  No orders to display         LABS:  Labs Reviewed   CBC WITH AUTO DIFFERENTIAL - Abnormal; Notable for the following components:       Result Value    RBC 3.90 (*)     Hemoglobin 11.3 (*)     Hematocrit 33.4 (*)     All other components within normal limits   BASIC METABOLIC PANEL - Abnormal; Notable for the following components:    Glucose 115 (*)     All other components within normal limits   URINE RT REFLEX TO CULTURE - Abnormal; Notable for the following components:    Leukocyte Esterase, Urine TRACE (*)     All other components within normal limits   MICROSCOPIC URINALYSIS - Abnormal; Notable for the following components:    Other Observations UA   (*)     Value: Utilizing a urinalysis as the only screening method to exclude a potential uropathogen can be unreliable in many patient populations. Rapid screening tests are less sensitive than culture and if UTI is a clinical possibility, culture should be considered despite a negative urinalysis. All other components within normal limits   PREGNANCY, URINE         EMERGENCY DEPARTMENT COURSE:   Vitals:    Vitals:    06/16/21 2206 06/16/21 2215 06/16/21 2230   BP: (!) 139/91     Pulse: 84     Resp: 18     Temp: 99.2 °F (37.3 °C)     TempSrc: Oral     SpO2: 100% 100% 100%   Weight: 250 lb (113.4 kg)     Height: 5' 8\" (1.727 m)       -------------------------  BP: (!) 139/91, Temp: 99.2 °F (37.3 °C), Pulse: 84, Resp: 18    Orders Placed This Encounter   Medications    0.9 % sodium chloride bolus    ondansetron (ZOFRAN) injection 4 mg    ketorolac (TORADOL) injection 30 mg           Re-evaluation Notes    Patient had normal laboratory results she was feeling better after the Toradol reevaluated she had minimal tenderness in thelower quadrant with no rebound or guarding patient remains nonsurgical abdomen on repeat exam labs are unremarkable based on her history that this occurred last month and now with recurrence earned about uterine in origin such as fibroids at this time night I cannot obtain a ultrasound she is upset that we cannot find an answer to her problem she was instructed follow-up with her primary for outpatient ultrasound return in morning if symptoms were to worsen    CRITICAL CARE:   None      CONSULTS:      PROCEDURES:  None    FINAL IMPRESSION      1.  Lower abdominal pain          DISPOSITION/PLAN   DISPOSITION Decision To Discharge 06/17/2021 12:34:16 AM      Condition on Disposition    Stable    PATIENT REFERRED TO:  Roman Valdivia MD  [address] [place] [address] [id number]    In 1 day        DISCHARGE MEDICATIONS:  Discharge Medication List as of 6/17/2021 12:34 AM          (Please note that portions of this note were completed with a voice recognition program.  Efforts were made to edit the dictations but occasionally words are mis-transcribed.)    Renea Oneill MD,, MD, F.A.C.E.P.   Attending Emergency Physician        Renea Oneill MD  06/18/21 7340

## 2021-06-21 ENCOUNTER — TELEPHONE (OUTPATIENT)
Dept: FAMILY MEDICINE CLINIC | Age: 36
End: 2021-06-21

## 2021-06-21 RX ORDER — LEVOTHYROXINE SODIUM 0.07 MG/1
75 TABLET ORAL DAILY
Qty: 90 TABLET | Refills: 1 | Status: CANCELLED | OUTPATIENT
Start: 2021-06-21 | End: 2022-06-21

## 2021-06-21 NOTE — TELEPHONE ENCOUNTER
----- Message from [facility] [employer] sent at 6/19/2021 11:39 AM EDT -----  Subject: Refill Request    QUESTIONS  Name of Medication? levothyroxine (SYNTHROID) 75 MCG tablet  Patient-reported dosage and instructions? 75 mg daily  How many days do you have left? 0  Preferred Pharmacy? [facility]/PHARMACY #[id number]  Pharmacy phone number (if available)? [phone]  ---------------------------------------------------------------------------  --------------  CALL BACK INFO  What is the best way for the office to contact you? OK to leave message on   voicemail  Preferred Call Back Phone Number?  [phone]

## 2021-06-22 ENCOUNTER — OFFICE VISIT (OUTPATIENT)
Dept: FAMILY MEDICINE CLINIC | Age: 36
End: 2021-06-22
Payer: MEDICAID

## 2021-06-22 VITALS
TEMPERATURE: 97.2 F | HEIGHT: 68 IN | OXYGEN SATURATION: 99 % | WEIGHT: 254.8 LBS | DIASTOLIC BLOOD PRESSURE: 72 MMHG | RESPIRATION RATE: 14 BRPM | BODY MASS INDEX: 38.62 KG/M2 | SYSTOLIC BLOOD PRESSURE: 122 MMHG | HEART RATE: 92 BPM

## 2021-06-22 DIAGNOSIS — D50.9 MICROCYTIC ANEMIA: ICD-10-CM

## 2021-06-22 DIAGNOSIS — E03.9 ACQUIRED HYPOTHYROIDISM: ICD-10-CM

## 2021-06-22 DIAGNOSIS — D64.9 NORMOCYTIC ANEMIA: ICD-10-CM

## 2021-06-22 DIAGNOSIS — N80.9 ENDOMETRIOSIS: Primary | ICD-10-CM

## 2021-06-22 PROCEDURE — 36415 COLL VENOUS BLD VENIPUNCTURE: CPT | Performed by: STUDENT IN AN ORGANIZED HEALTH CARE EDUCATION/TRAINING PROGRAM

## 2021-06-22 PROCEDURE — 99213 OFFICE O/P EST LOW 20 MIN: CPT | Performed by: STUDENT IN AN ORGANIZED HEALTH CARE EDUCATION/TRAINING PROGRAM

## 2021-06-22 RX ORDER — METRONIDAZOLE 500 MG/1
500 TABLET ORAL 2 TIMES DAILY
COMMUNITY
Start: 2021-06-21 | End: 2021-06-28

## 2021-06-22 ASSESSMENT — ENCOUNTER SYMPTOMS
COUGH: 0
SORE THROAT: 0
BACK PAIN: 0
CHEST TIGHTNESS: 0
WHEEZING: 0
DIARRHEA: 0
CONSTIPATION: 0
ABDOMINAL DISTENTION: 0
SHORTNESS OF BREATH: 0
ABDOMINAL PAIN: 0

## 2021-06-22 NOTE — PROGRESS NOTES
focal deficit present. Mental Status: She is alert and oriented to person, place, and time. An electronic signature was used to authenticate this note.     --Jackie Cerda MD

## 2021-07-13 DIAGNOSIS — F32.A DEPRESSION, UNSPECIFIED DEPRESSION TYPE: Primary | ICD-10-CM

## 2021-07-13 NOTE — TELEPHONE ENCOUNTER
Last visit: 6/22/21  Last Med refill: 4/12/21    Next Visit Date:  Future Appointments   Date Time Provider Brandon Sarah   7/16/2021  9:00 AM NELLY Pena - [facility]   12/22/2021  4:00 PM MD Tisha Ochoa. Raquel Cooper [facility] Maintenance   Topic Date Due    COVID-19 Vaccine (1) 09/30/2021 (Originally 1/14/1998)    DTaP/Tdap/Td vaccine (1 - Tdap) 05/11/2022 (Originally 1/14/2005)    Cervical cancer screen  05/11/2022 (Originally 1/14/2007)    Hepatitis C screen  05/11/2022 (Originally 1985)    HIV screen  05/11/2022 (Originally 1/14/2001)    Flu vaccine (1) 09/01/2021    Hepatitis A vaccine  Aged Out    Hepatitis B vaccine  Aged Out    Hib vaccine  Aged Out    Meningococcal (ACWY) vaccine  Aged Out    Pneumococcal 0-64 years Vaccine  Aged Out    Varicella vaccine  Discontinued       No results found for: LABA1C          ( goal A1C is < 7)   No results found for: LABMICR  No results found for: LDLCHOLESTEROL, LDLCALC    (goal LDL is <100)   AST (U/L)   Date Value   06/17/2021 11     ALT (U/L)   Date Value   06/17/2021 7     BUN (mg/dL)   Date Value   06/17/2021 17     BP Readings from Last 3 Encounters:   06/22/21 122/72   06/17/21 (!) 147/98   06/16/21 (!) 139/91          (goal 120/80)    All Future Testing planned in [facility]  Lab Frequency Next Occurrence   US PELVIS COMPLETE NON-OB TRANSABDOMINAL AND TRANSVAGINAL Once 05/11/2022   Comprehensive Metabolic Panel, Fasting Once 05/11/2022   Ferritin Once 12/22/2021   Transferrin Once 12/22/2021   Iron And TIBC Once 12/22/2021   TSH With Reflex Ft4 Once 12/22/2021               There is no problem list on file for this patient.                 ----- Message from Jeaneth Molina sent at 7/13/2021  2:13 PM EDT -----  Subject: Refill Request    QUESTIONS  Name of Medication? venlafaxine (EFFEXOR XR) 75 MG extended release   capsule  Patient-reported dosage and instructions?  75mg once a day   How many days do you have left? 0  Preferred Pharmacy? [facility]/PHARMACY #[id number]  Pharmacy phone number (if available)? [phone]  ---------------------------------------------------------------------------  --------------  CALL BACK INFO  What is the best way for the office to contact you? OK to leave message on   voicemail  Preferred Call Back Phone Number?  [phone]

## 2021-07-14 RX ORDER — VENLAFAXINE HYDROCHLORIDE 75 MG/1
CAPSULE, EXTENDED RELEASE ORAL
Qty: 30 CAPSULE | Refills: 5 | Status: SHIPPED | OUTPATIENT
Start: 2021-07-14 | End: 2022-05-28 | Stop reason: SDUPTHER

## 2021-07-16 ENCOUNTER — HOSPITAL ENCOUNTER (OUTPATIENT)
Age: 36
Discharge: HOME | End: 2021-07-16
Payer: MEDICAID

## 2021-07-16 ENCOUNTER — OFFICE VISIT (OUTPATIENT)
Dept: OBGYN CLINIC | Age: 36
End: 2021-07-16
Payer: MEDICAID

## 2021-07-16 VITALS
WEIGHT: 252 LBS | DIASTOLIC BLOOD PRESSURE: 74 MMHG | HEIGHT: 68 IN | SYSTOLIC BLOOD PRESSURE: 120 MMHG | BODY MASS INDEX: 38.19 KG/M2

## 2021-07-16 DIAGNOSIS — Z82.49 FAMILY HISTORY OF BLOOD CLOTS: ICD-10-CM

## 2021-07-16 DIAGNOSIS — L67.8 ABNORMAL FACIAL HAIR: ICD-10-CM

## 2021-07-16 DIAGNOSIS — R14.0 BLOATING: ICD-10-CM

## 2021-07-16 DIAGNOSIS — R10.2 PELVIC PAIN: ICD-10-CM

## 2021-07-16 DIAGNOSIS — L67.8 ABNORMAL FACIAL HAIR: Primary | ICD-10-CM

## 2021-07-16 DIAGNOSIS — Z98.51 HX OF TUBAL LIGATION: ICD-10-CM

## 2021-07-16 LAB
ALT SERPL-CCNC: 11 U/L (ref 5–33)
AST SERPL-CCNC: 13 U/L
BUN BLDV-MCNC: 11 MG/DL (ref 6–20)
CREAT SERPL-MCNC: 0.88 MG/DL (ref 0.5–0.9)
ESTIMATED AVERAGE GLUCOSE: 108 MG/DL
FOLLICLE STIMULATING HORMONE: 6.7 U/L (ref 1.7–21.5)
GFR AFRICAN AMERICAN: >60 ML/MIN
GFR NON-AFRICAN AMERICAN: >60 ML/MIN
GFR SERPL CREATININE-BSD FRML MDRD: NORMAL ML/MIN/{1.73_M2}
GFR SERPL CREATININE-BSD FRML MDRD: NORMAL ML/MIN/{1.73_M2}
GLUCOSE BLD-MCNC: 81 MG/DL (ref 70–99)
HBA1C MFR BLD: 5.4 % (ref 4–6)
HOMOCYSTEINE: 10.4 UMOL/L
INSULIN COMMENT: 1145
INSULIN REFERENCE RANGE:: NORMAL
INSULIN: 75.5 MU/L
LH: 10.5 U/L (ref 1–95.6)
T. PALLIDUM, IGG: NONREACTIVE

## 2021-07-16 PROCEDURE — 83090 ASSAY OF HOMOCYSTEINE: CPT

## 2021-07-16 PROCEDURE — 86038 ANTINUCLEAR ANTIBODIES: CPT

## 2021-07-16 PROCEDURE — 99203 OFFICE O/P NEW LOW 30 MIN: CPT | Performed by: NURSE PRACTITIONER

## 2021-07-16 PROCEDURE — 83525 ASSAY OF INSULIN: CPT

## 2021-07-16 PROCEDURE — 83002 ASSAY OF GONADOTROPIN (LH): CPT

## 2021-07-16 PROCEDURE — 85300 ANTITHROMBIN III ACTIVITY: CPT

## 2021-07-16 PROCEDURE — 85306 CLOT INHIBIT PROT S FREE: CPT

## 2021-07-16 PROCEDURE — 83001 ASSAY OF GONADOTROPIN (FSH): CPT

## 2021-07-16 PROCEDURE — 83036 HEMOGLOBIN GLYCOSYLATED A1C: CPT

## 2021-07-16 PROCEDURE — 84460 ALANINE AMINO (ALT) (SGPT): CPT

## 2021-07-16 PROCEDURE — 85301 ANTITHROMBIN III ANTIGEN: CPT

## 2021-07-16 PROCEDURE — 86225 DNA ANTIBODY NATIVE: CPT

## 2021-07-16 PROCEDURE — 84450 TRANSFERASE (AST) (SGOT): CPT

## 2021-07-16 PROCEDURE — 85613 RUSSELL VIPER VENOM DILUTED: CPT

## 2021-07-16 PROCEDURE — 86146 BETA-2 GLYCOPROTEIN ANTIBODY: CPT

## 2021-07-16 PROCEDURE — 81241 F5 GENE: CPT

## 2021-07-16 PROCEDURE — 85303 CLOT INHIBIT PROT C ACTIVITY: CPT

## 2021-07-16 PROCEDURE — 86147 CARDIOLIPIN ANTIBODY EA IG: CPT

## 2021-07-16 PROCEDURE — 82565 ASSAY OF CREATININE: CPT

## 2021-07-16 PROCEDURE — 85730 THROMBOPLASTIN TIME PARTIAL: CPT

## 2021-07-16 PROCEDURE — 82947 ASSAY GLUCOSE BLOOD QUANT: CPT

## 2021-07-16 PROCEDURE — 86780 TREPONEMA PALLIDUM: CPT

## 2021-07-16 PROCEDURE — 84520 ASSAY OF UREA NITROGEN: CPT

## 2021-07-16 PROCEDURE — 86235 NUCLEAR ANTIGEN ANTIBODY: CPT

## 2021-07-16 PROCEDURE — 36415 COLL VENOUS BLD VENIPUNCTURE: CPT

## 2021-07-16 PROCEDURE — 85610 PROTHROMBIN TIME: CPT

## 2021-07-16 PROCEDURE — 85302 CLOT INHIBIT PROT C ANTIGEN: CPT

## 2021-07-16 NOTE — PROGRESS NOTES
Hardy Simental  2021    YOB: 1985          The patient was seen today. She is here regarding follow up from ER for pelvic pain. U/s on 2021 was unremarkable. Follicles noted in right ovary. No signs of torsion. + BV- treated. Denies vaginal discharge, odor, burning. Hx tubal ligation 10/2018. States this pain started after. Having intermittent shooting pain down her legs and back 2 days prior and during menses. C/o facial hair that she shaves daily, acne. Desires to be tested for PCOS. States she can not live with this pain for another month. C/o bloating through out the whole month. States when she bends over her uterus feels like it will fall out of her abdominal muscles. Her bowels are regular and she is voiding without difficulty. HPI:  Hardy Simental is a 29 y.o. female No obstetric history on file.      Pelvic pain  Bloating  Facial hair growth  ER follow up  PCOS testing  Acne  Family hx of blood clots- father passed away from a PE  States she had clotting factors tested 3 years ago and was positive for one gene- unsure of what        OB History    Para Term  AB Living   3 3 3 0 0 3   SAB TAB Ectopic Molar Multiple Live Births   0 0 0 0 0 3      # Outcome Date GA Lbr Eldon/2nd Weight Sex Delivery Anes PTL Lv   3 Term 2018    F CS-LTranv   SILVINO   2 Term 2016 [id number]   F CS-LTranv   SILVINO   1 Term 2006    F Vag-Spont   SILVINO       Past Medical History:   Diagnosis Date    Fibroid uterus     Thyroid disease        Past Surgical History:   Procedure Laterality Date     SECTION      TUBAL LIGATION         Family History   Problem Relation Age of Onset    Thyroid Disease Mother     Hypertension Mother     Other Father     Thyroid Disease Maternal Grandmother     Hypertension Brother     Thyroid Disease Maternal Aunt     Ovarian Cancer Maternal Aunt     Cervical Cancer Paternal Aunt        Social History     Socioeconomic History    Marital status: Single Spouse name: Not on file    Number of children: Not on file    Years of education: Not on file    Highest education level: Not on file   Occupational History    Not on file   Tobacco Use    Smoking status: Never Smoker    Smokeless tobacco: Never Used   Vaping Use    Vaping Use: Never used   Substance and Sexual Activity    Alcohol use: Not Currently    Drug use: Never    Sexual activity: Yes     Partners: Male   Other Topics Concern    Not on file   Social History Narrative    Not on file     Social Determinants of Health     Financial Resource Strain: Low Risk     Difficulty of Paying Living Expenses: Not hard at all   Food Insecurity: Food Insecurity Present    Worried About [address] [employer] in the Last Year: Often true    Jef of Food in the Last Year: Often true   Transportation Needs:     Lack of Transportation (Medical):  Lack of Transportation (Non-Medical):    Physical Activity:     Days of Exercise per Week:     Minutes of Exercise per Session:    Stress:     Feeling of Stress :    Social Connections:     Frequency of Communication with Friends and Family:     Frequency of Social Gatherings with Friends and Family:     Attends [employer] Services:     Active Member of Clubs or Organizations:     Attends Club or Organization Meetings:     Marital Status:    Intimate Partner Violence:     Fear of Current or Ex-Partner:     Emotionally Abused:     Physically Abused:     Sexually Abused:          MEDICATIONS:  Current Outpatient Medications   Medication Sig Dispense Refill    venlafaxine (EFFEXOR XR) 75 MG extended release capsule TAKE ONE CAPSULE BY MOUTH WITH FOOD ONCE DAILY 30 capsule 5    levothyroxine (SYNTHROID) 75 MCG tablet Take 1 tablet by mouth Daily 90 tablet 1    ibuprofen (ADVIL;MOTRIN) 800 MG tablet Take 1 tablet by mouth 2 times daily as needed for Pain 60 tablet 0     No current facility-administered medications for this visit. administration of intravenous contrast. Multiplanar reformatted images are provided for review. Dose modulation, iterative reconstruction, and/or weight based adjustment of the mA/kV was utilized to reduce the radiation dose to as low as reasonably achievable. COMPARISON: None. HISTORY: ORDERING SYSTEM PROVIDED HISTORY: ab pain TECHNOLOGIST PROVIDED HISTORY: ab pain Decision Support Exception - unselect if not a suspected or confirmed emergency medical condition->Emergency Medical Condition (MA) Is the patient pregnant?->No Reason for Exam: lower abd pain , worsening over a month, bloating Acuity: Acute Type of Exam: Initial Relevant Medical/Surgical History: neg preg, FINDINGS: Lower Chest: The lung bases are without consolidation or effusion. The visualized cardiac structures are unremarkable. Organs: The liver and spleen are normal size and overall attenuation. The gallbladder is contracted. The pancreas and adrenal glands are unremarkable. The kidneys are without obstructive uropathy. The ureters are not dilated. The urinary bladder is unremarkable. GI/Bowel: The stomach is unremarkable. Loops of small bowel are normal in caliber without evidence for obstruction. The colon contains air and fecal residue and is otherwise unremarkable. The appendix is normal.  There is no intraperitoneal free air or free fluid. Pelvis: The uterus is age-appropriate. Peritoneum/Retroperitoneum: The psoas muscles are symmetric. The abdominal aorta is normal in caliber. The inferior vena cava is unremarkable. There is no retroperitoneal or mesenteric adenopathy. Bones/Soft Tissues: The extra-abdominal soft tissues are unremarkable. There is no acute osseous abnormality. No acute abdominal or pelvic abnormality.        Lab Results:  Results for orders placed or performed during the hospital encounter of 06/17/21   CBC Auto Differential   Result Value Ref Range    WBC 8.8 3.5 - 11.0 k/uL    RBC 4.13 4.0 - 5.2 m/uL Hemoglobin 11.8 (L) 12.0 - 16.0 g/dL    Hematocrit 35.5 (L) 36 - 46 %    MCV 86.0 80 - 100 fL    MCH 28.7 26 - 34 pg    MCHC 33.3 31 - 37 g/dL    RDW 13.1 12.5 - 15.4 %    Platelets 602 125 - 173 k/uL    MPV 7.4 6.0 - 12.0 fL    NRBC Automated NOT REPORTED per 100 WBC    Differential Type NOT REPORTED     Seg Neutrophils 62 36 - 66 %    Lymphocytes 27 24 - 44 %    Monocytes 7 2 - 11 %    Eosinophils % 3 1 - 4 %    Basophils 1 0 - 2 %    Immature Granulocytes NOT REPORTED 0 %    Segs Absolute 5.60 1.8 - 7.7 k/uL    Absolute Lymph # 2.40 1.0 - 4.8 k/uL    Absolute Mono # 0.60 0.1 - 1.2 k/uL    Absolute Eos # 0.20 0.0 - 0.4 k/uL    Basophils Absolute 0.10 0.0 - 0.2 k/uL    Absolute Immature Granulocyte NOT REPORTED 0.00 - 0.30 k/uL    WBC Morphology NOT REPORTED     RBC Morphology NOT REPORTED     Platelet Estimate NOT REPORTED    Comprehensive Metabolic Panel   Result Value Ref Range    Glucose 92 70 - 99 mg/dL    BUN 17 6 - 20 mg/dL    CREATININE 0.90 0.50 - 0.90 mg/dL    Bun/Cre Ratio NOT REPORTED 9 - 20    Calcium 9.7 8.6 - 10.4 mg/dL    Sodium 141 135 - 144 mmol/L    Potassium 4.7 3.7 - 5.3 mmol/L    Chloride 107 98 - 107 mmol/L    CO2 27 20 - 31 mmol/L    Anion Gap 7 (L) 9 - 17 mmol/L    Alkaline Phosphatase 64 35 - 104 U/L    ALT 7 5 - 33 U/L    AST 11 <32 U/L    Total Bilirubin 0.30 0.3 - 1.2 mg/dL    Total Protein 7.1 6.4 - 8.3 g/dL    Albumin 4.2 3.5 - 5.2 g/dL    Albumin/Globulin Ratio 1.4 1.0 - 2.5    GFR Non-African American >60 >60 mL/min    GFR African American >60 >60 mL/min    GFR Comment          GFR Staging NOT REPORTED          Assessment:   Diagnosis Orders   1. Abnormal facial hair  Follicle Stimulating Hormone    Luteinizing Hormone    Glucose, Fasting    Insulin, total    Hemoglobin A1C    Glucose, random    ALT    AST    BUN & Creatinine   2. Bloating     3.  Family history of blood clots  SHARLENE    Sjogrens syndrome-A extractable nuclear antibody    ANTI-PHOSPHOLIPID AB    Sjogrens syndrome-B extractable nuclear antibody    Beta-2 Glycoprotein Antibodies    Lupus Anticoagulant    Antithrombin III Activity    Antithrombin III antigen    Factor 5 Leiden    Homocysteine, Serum    MTHFR Mutation 677T/[id number]    Protein C Antigen, Total    Protein C Functional    Protein S Antigen, Free    Protein S Functional    Prothrombin 20210 Mutation Invader    T. pallidum Ab   4. Pelvic pain     5. Hx of tubal ligation       Patient Active Problem List    Diagnosis Date Noted    Abnormal facial hair 07/16/2021    Bloating 07/16/2021    Family history of blood clots 07/16/2021           PLAN:  Return in about 4 weeks (around 8/13/2021) for follow up with physician. Lab slip given  Declined referral to GI for bloating  Desires hormonal control of menses if labs are negative- POP  Referred to pelvic floor PT  Repeat Annual every 1 year  Cervical Cytology Evaluation begins at 24years old. If Negative Cytology, Follow-up screening per current guidelines. Return to the office in 4 weeks. Counseled on preventative health maintenance follow-up.   Orders Placed This Encounter   Procedures    Follicle Stimulating Hormone     Standing Status:   Future     Standing Expiration Date:   7/16/2022    Luteinizing Hormone     Standing Status:   Future     Standing Expiration Date:   7/16/2022    Glucose, Fasting     Standing Status:   Future     Standing Expiration Date:   7/16/2022    Insulin, total     Patient must be fasting for 12-14 hrs     Standing Status:   Future     Standing Expiration Date:   8/15/2021    Hemoglobin A1C     Standing Status:   Future     Standing Expiration Date:   7/16/2022    Glucose, random     Standing Status:   Future     Standing Expiration Date:   7/16/2022    ALT     Standing Status:   Future     Standing Expiration Date:   7/16/2022    AST     Standing Status:   Future     Standing Expiration Date:   7/16/2022    BUN & Creatinine     Standing Status:   Future     Standing Expiration Date:   7/16/2022    SHARLENE     Standing Status:   Future     Standing Expiration Date:   7/16/2022    Sjogrens syndrome-A extractable nuclear antibody     Standing Status:   Future     Standing Expiration Date:   8/15/2021    ANTI-PHOSPHOLIPID AB     Standing Status:   Future     Standing Expiration Date:   8/15/2021    Sjogrens syndrome-B extractable nuclear antibody     Standing Status:   Future     Standing Expiration Date:   8/15/2021    Beta-2 Glycoprotein Antibodies     Standing Status:   Future     Standing Expiration Date:   10/14/2021    Lupus Anticoagulant     Standing Status:   Future     Standing Expiration Date:   7/16/2022    Antithrombin III Activity     Standing Status:   Future     Standing Expiration Date:   1/16/2022    Antithrombin III antigen     Standing Status:   Future     Standing Expiration Date:   1/16/2022    Factor 5 Leiden     Standing Status:   Future     Standing Expiration Date:   7/16/2022     Order Specific Question:   Factor V Specimen     Answer:   BLOOD    Homocysteine, Serum     Standing Status:   Future     Standing Expiration Date:   1/16/2022    MTHFR Mutation [id number]     Standing Status:   Future     Standing Expiration Date:   7/16/2022    Protein C Antigen, Total     Standing Status:   Future     Standing Expiration Date:   1/16/2022    Protein C Functional     Standing Status:   Future     Standing Expiration Date:   8/15/2021    Protein S Antigen, Free     Standing Status:   Future     Standing Expiration Date:   8/15/2021    Protein S Functional     Standing Status:   Future     Standing Expiration Date:   8/15/2021    Prothrombin 50836 Mutation Invader     Standing Status:   Future     Standing Expiration Date:   7/16/2022    T. pallidum Ab     Standing Status:   Future     Standing Expiration Date:   8/15/2021           The patient, Carol Winkler is a 29 y.o. female, was seen with a total time spent of 30 minutes for the visit on this date of

## 2021-07-18 LAB
PROTEIN C ANTIGEN: >95 % (ref 63–153)
PROTEIN S ANTIGEN, FREE: 101 % (ref 55–123)

## 2021-07-20 LAB
ANTI DNA DOUBLE STRANDED: 2.6 IU/ML
ANTI SSA: 0.4 U/ML
ANTI SSB: <0.3 U/ML
ANTI-NUCLEAR ANTIBODY (ANA): NEGATIVE
ANTI-THROMBIN 3 AG: 99 % (ref 82–136)
BETA 2 GLYCOPROT.1 IGA AB: 1.7 ELISA U/ML (ref 0–7)
BETA 2 GLYCOPROT.1 IGG AB: 1 ELISA U/ML (ref 0–7)
BETA 2 GLYCOPROT.1 IGM AB: <0.9 ELISA U/ML (ref 0–7)
ENA ANTIBODIES SCREEN: 0.1 U/ML

## 2021-07-21 LAB
ANTICARDIOLIPIN IGA ANTIBODY: 3.4 APL (ref 0–14)
ANTICARDIOLIPIN IGG ANTIBODY: 2.1 GPL (ref 0–10)
AT-III ACTIVITY: 103 % (ref 83–122)
CARDIOLIPIN AB IGM: <0.8 MPL (ref 0–10)
DILUTE RUSSELL VIPER VENOM TIME: NORMAL
INR BLD: 0.9
LUPUS ANTICOAG: NORMAL
PARTIAL THROMBOPLASTIN TIME: 32.7 SEC (ref 24–36)
PROTEIN C ACTIVITY: >150 %
PROTEIN S ACTIVITY: 112 % (ref 59–130)
PROTHROMBIN TIME: 12.7 SEC (ref 11.8–14.6)

## 2021-07-22 LAB
FACTOR V MUTATION: NEGATIVE
SPECIMEN: NORMAL

## 2021-09-14 ENCOUNTER — OFFICE VISIT (OUTPATIENT)
Dept: FAMILY MEDICINE CLINIC | Age: 36
End: 2021-09-14
Payer: MEDICAID

## 2021-09-14 ENCOUNTER — NURSE TRIAGE (OUTPATIENT)
Dept: OTHER | Facility: CLINIC | Age: 36
End: 2021-09-14

## 2021-09-14 VITALS — HEART RATE: 96 BPM | BODY MASS INDEX: 37.89 KG/M2 | OXYGEN SATURATION: 99 % | WEIGHT: 250 LBS | HEIGHT: 68 IN

## 2021-09-14 DIAGNOSIS — T63.481A ALLERGIC REACTION TO INSECT STING, ACCIDENTAL OR UNINTENTIONAL, INITIAL ENCOUNTER: Primary | ICD-10-CM

## 2021-09-14 PROCEDURE — 99214 OFFICE O/P EST MOD 30 MIN: CPT

## 2021-09-14 RX ORDER — PREDNISONE 10 MG/1
40 TABLET ORAL DAILY
Qty: 4 TABLET | Refills: 0 | Status: SHIPPED | OUTPATIENT
Start: 2021-09-14 | End: 2021-09-15

## 2021-09-14 ASSESSMENT — ENCOUNTER SYMPTOMS
NAUSEA: 0
COUGH: 0
WHEEZING: 0
COLOR CHANGE: 1
VOMITING: 0

## 2021-09-14 NOTE — PROGRESS NOTES
Waqas Gomes (:  1985) is a 29 y.o. female,Established patient, here for evaluation of the following chief complaint(s):  Insect Bite         ASSESSMENT/PLAN:  1. Allergic reaction to insect sting, accidental or unintentional, initial encounter  -     predniSONE (DELTASONE) 10 MG tablet; Take 4 tablets by mouth daily for 1 dose, Disp-4 tablet, R-0Normal    Due to significant improvement since original sting yesterday, will do a one-time oral dose of steroids. Patient advised to follow-up as needed if redness or swelling gets worse. Advised to remove stinger. May use hydrocortisone cream at site for relief. Subjective   SUBJECTIVE/OBJECTIVE:  Other  This is a new (bee/wasp sting) problem. The current episode started yesterday. The problem has been gradually improving. Associated symptoms include chills ( subsided since last night). Pertinent negatives include no chest pain, congestion, coughing, fatigue, fever, nausea, numbness, vomiting or weakness. Associated symptoms comments: Chills subsided today. . Treatments tried: Benadryl oral and hydrocortisone cream. The treatment provided moderate relief. Review of Systems   Constitutional: Positive for chills ( subsided since last night). Negative for fatigue and fever. HENT: Negative for congestion. Respiratory: Negative for cough and wheezing. Cardiovascular: Negative for chest pain and palpitations. Gastrointestinal: Negative for nausea and vomiting. Skin: Positive for color change ( reddened at area of sting). Neurological: Negative for tremors, weakness and numbness. Psychiatric/Behavioral: Negative for agitation and sleep disturbance. Objective   Physical Exam  Constitutional:       Appearance: Normal appearance. She is obese. HENT:      Nose: No congestion or rhinorrhea. Cardiovascular:      Rate and Rhythm: Normal rate and regular rhythm. Pulses: Normal pulses. Heart sounds: Normal heart sounds. Pulmonary:      Effort: Pulmonary effort is normal.      Breath sounds: Normal breath sounds. Abdominal:      General: Bowel sounds are normal.      Palpations: Abdomen is soft. Skin:     General: Skin is warm. Comments: Attempted to retrieve stinger with sterile forceps. Was unsuccessful. Offered other options to retrieve stinger, however patient states that she will attempt to get it out at home herself. Neurological:      Mental Status: She is alert. On this date 9/14/2021 I have spent 30 minutes reviewing previous notes, test results and face to face with the patient discussing the diagnosis and importance of compliance with the treatment plan as well as documenting on the day of the visit. An electronic signature was used to authenticate this note.     --Robin Herndon MA

## 2021-09-14 NOTE — TELEPHONE ENCOUNTER
Received call from David Campos at [facility] with [employer]. Brief description of triage: Swelling from wasp sting, right wrist is swollen, sting was on right wrist.  Large area of redness on wrist 3-4 inches. Took some benadryl, eyes feel puffy, tongue is dry. Triage indicates for patient to be seen today, Did advise of [facility] or walk in clinic if no appointments available. Care advice provided, patient verbalizes understanding; denies any other questions or concerns; instructed to call back for any new or worsening symptoms. Writer provided warm transfer to [employer] at [facility] for appointment scheduling. Attention Provider: Thank you for allowing me to participate in the care of your patient. The patient was connected to triage in response to information provided to the [facility]. Please do not respond through this encounter as the response is not directed to a shared pool. Reason for Disposition   Fever and area is red    Answer Assessment - Initial Assessment Questions  1. TYPE: \"What type of sting was it? \" (bee, yellow jacket, etc.)       Wasp     2. ONSET: \"When did it occur? \"       Yesterday     3. LOCATION: \"Where is the sting located? \"  \"How many stings? \"      Right wrist     4. SWELLING SIZE: \"How big is the swelling? \" (e.g., inches or cm)      3-4 inches round     5. REDNESS: \"Is the area red or pink? \" If so, ask \"What size is area of redness? \" (e.g., inches or cm). \"When did the redness start? \"      Red, hot to the touch     6. PAIN: \"Is there any pain? \" If so, ask: \"How bad is it? \"  (Scale 1-10; or mild, moderate, severe)      3/10    7. ITCHING: \"Is there any itching? \" If so, ask: \"How bad is it? \"       Very itching     8. RESPIRATORY DISTRESS: \"Describe your breathing. \"      Denies     9. PRIOR REACTIONS: \"Have you had any severe allergic reactions to stings in the past?\" if yes, ask: \"What happened? \"      Denies     10.  OTHER SYMPTOMS: \"Do you have any other symptoms? \" (e.g., abdominal pain, face or tongue swelling, new rash elsewhere, vomiting)        Body aches, puffy eyes, dry mouth     11. PREGNANCY: \"Is there any chance you are pregnant? \" \"When was your last menstrual period? \"        Denies    Protocols used: BEE OR YELLOW JACKET STING-ADULT-OH

## 2021-09-16 ENCOUNTER — VIRTUAL VISIT (OUTPATIENT)
Dept: OBGYN CLINIC | Age: 36
End: 2021-09-16
Payer: MEDICAID

## 2021-09-16 DIAGNOSIS — E88.81 INSULIN RESISTANCE: ICD-10-CM

## 2021-09-16 DIAGNOSIS — R14.0 BLOATING: ICD-10-CM

## 2021-09-16 DIAGNOSIS — E03.8 OTHER SPECIFIED HYPOTHYROIDISM: ICD-10-CM

## 2021-09-16 DIAGNOSIS — R93.89 ENDOMETRIAL THICKENING ON ULTRASOUND: ICD-10-CM

## 2021-09-16 DIAGNOSIS — Z98.51 HX OF TUBAL LIGATION: ICD-10-CM

## 2021-09-16 DIAGNOSIS — N92.0 MENORRHAGIA WITH REGULAR CYCLE: ICD-10-CM

## 2021-09-16 DIAGNOSIS — R10.2 PELVIC PAIN: Primary | ICD-10-CM

## 2021-09-16 DIAGNOSIS — Z82.49 FAMILY HISTORY OF BLOOD CLOTS: ICD-10-CM

## 2021-09-16 DIAGNOSIS — E28.2 PCO (POLYCYSTIC OVARIES): ICD-10-CM

## 2021-09-16 DIAGNOSIS — L67.8 ABNORMAL FACIAL HAIR: ICD-10-CM

## 2021-09-16 PROCEDURE — 99214 OFFICE O/P EST MOD 30 MIN: CPT | Performed by: OBSTETRICS & GYNECOLOGY

## 2021-09-16 RX ORDER — METFORMIN HYDROCHLORIDE 750 MG/1
750 TABLET, EXTENDED RELEASE ORAL
Qty: 30 TABLET | Refills: 3 | Status: SHIPPED | OUTPATIENT
Start: 2021-09-16 | End: 2021-12-22

## 2021-09-16 NOTE — PROGRESS NOTES
Weston Page  2021    Tonya Wall (:  1985) has requested an audio/video evaluation for the following concern(s):    1. Pelvic pain    2. Abnormal facial hair    3. Bloating    4. Family history of blood clots    5. Hx of tubal ligation    6. Insulin resistance    7. PCO (polycystic ovaries)    8. Menorrhagia with regular cycle    9. Other specified hypothyroidism    10. Endometrial thickening on ultrasound          TELEHEALTH EVALUATION -- Audio/Visual (During [id number] public health emergency)      Weston Page is a 29 y.o. female [id number]      She was here to follow-up regarding her labs and diagnostics ordered at her last visit for the diagnosis of:    ICD-10-CM    1. Pelvic pain  R10.2    2. Abnormal facial hair  L67.8    3. Bloating  R14.0    4. Family history of blood clots  Z82.49    5. Hx of tubal ligation  Z98.51    6. Insulin resistance  E88.81 metFORMIN (GLUCOPHAGE XR) 750 MG extended release tablet   7. PCO (polycystic ovaries)  E28.2 metFORMIN (GLUCOPHAGE XR) 750 MG extended release tablet   8. Menorrhagia with regular cycle  N92.0 TSH     T4, Free     Iron and TIBC     Ferritin     Transferrin     HCG, Quantitative, Pregnancy   9. Other specified hypothyroidism  E03.8 TSH     T4, Free   10. Endometrial thickening on ultrasound  R93.89         Her bowels are regular and she is voiding without difficulty.        Past Medical History:   Diagnosis Date    Fibroid uterus 2018    Thyroid disease          Past Surgical History:   Procedure Laterality Date     SECTION      TUBAL LIGATION           Family History   Problem Relation Age of Onset    Thyroid Disease Mother     Hypertension Mother     Other Father     Thyroid Disease Maternal Grandmother     Hypertension Brother     Thyroid Disease Maternal Aunt     Ovarian Cancer Maternal Aunt     Cervical Cancer Paternal Aunt          Social History     Tobacco Use    Smoking status: Never Smoker    Smokeless tobacco: Never Used   Vaping Use    Vaping Use: Never used   Substance Use Topics    Alcohol use: Not Currently    Drug use: Never         MEDICATIONS:  Current Outpatient Medications   Medication Sig Dispense Refill    metFORMIN (GLUCOPHAGE XR) 750 MG extended release tablet Take 1 tablet by mouth daily (with breakfast) 30 tablet 3    venlafaxine (EFFEXOR XR) 75 MG extended release capsule TAKE ONE CAPSULE BY MOUTH WITH FOOD ONCE DAILY 30 capsule 5    levothyroxine (SYNTHROID) 75 MCG tablet Take 1 tablet by mouth Daily 90 tablet 1    ibuprofen (ADVIL;MOTRIN) 800 MG tablet Take 1 tablet by mouth 2 times daily as needed for Pain 60 tablet 0     No current facility-administered medications for this visit. ALLERGIES:  Allergies as of 09/16/2021 - Fully Reviewed 09/16/2021   Allergen Reaction Noted    Shellfish-derived products Hives 04/23/2021    Iodine  06/17/2021         There were no vitals taken for this visit. PE is limited due to the virtual visit    Abdomen: Soft non-tender; good bowel sounds. No guarding, rebound or rigidity. No CVA tenderness bilaterally reported when questioned. (Viewed Virtually)    Extremities: No calf tenderness, DTR 2/4, and No edema bilaterally as inspected by video and palpation by the patient (Viewed Virtually)    Pelvic: (Virtual Visit-Not Completed)    Diagnostics:  Pelvic ultrasound with Doppler on 6/17/2021     Provided history: Midline pelvic pain. Comparison: None     Technique: Grayscale sonographic imaging of the pelvis was performed. Transabdominal imaging was used to assess the uterus and extrauterine structures. Transvaginal imaging was used to assess the pelvic structures in greater detail. Color flow imaging and duplex spectral Doppler waveform analysis   evaluation of the ovaries was performed with arterial and venous spectral waveforms obtained and reviewed in view of the clinical history of pelvic pain.      FINDINGS:     Uterus retroverted on transvaginal imaging.  It measures 8.1 x 4.5 x 6.4 cm.  No myometrial mass.  Individual complex is partially 15 mm in thickness.  Minimal amount of free fluid in the posterior cul-de-sac. Right ovary is 3.5 x 3.0 x 2.6 cm.  Follicular changes are noted with dominant follicle measuring 1.5 x 1.3 x 1.3 cm.  Minimal right adnexal fluid.  Color Doppler demonstrates normal perfusion.  Arterial and venous spectral waveforms are normal.     Left ovary is 3.0 x 1.5 x 2.7 cm.  Follicular changes are noted.  No adnexal mass or free fluid.  Color Doppler demonstrates normal perfusion.  Normal arterial and venous spectral waveforms. IMPRESSION:     No sonographic evidence of ovarian torsion or acute abnormality.          Lab Results:  Results for orders placed or performed during the hospital encounter of 48/48/32   Follicle Stimulating Hormone   Result Value Ref Range    FSH 6.7 1.7 - 21.5 U/L   Luteinizing Hormone   Result Value Ref Range    LH 10.5 1.0 - 95.6 U/L   Insulin, total   Result Value Ref Range    Insulin Comment 1,145     Insulin 75.5 mU/L    Insulin Reference Range:         Hemoglobin A1C   Result Value Ref Range    Hemoglobin A1C 5.4 4.0 - 6.0 %    Estimated Avg Glucose 108 mg/dL   Glucose, random   Result Value Ref Range    Glucose 81 70 - 99 mg/dL   ALT   Result Value Ref Range    ALT 11 5 - 33 U/L   AST   Result Value Ref Range    AST 13 <32 U/L   BUN & Creatinine   Result Value Ref Range    BUN 11 6 - 20 mg/dL    CREATININE 0.88 0.50 - 0.90 mg/dL    GFR Non-African American >60 >60 mL/min    GFR African American >60 >60 mL/min    GFR Comment          GFR Staging NOT REPORTED    Antithrombin III antigen   Result Value Ref Range    Anti-thrombin 3 Ag 99 82 - 136 %   SHARLENE   Result Value Ref Range    SHARLENE NEGATIVE NEGATIVE    ANURADHA Antibodies Screen 0.1 <0.7 U/mL    Anti ds DNA 2.6 <10.0 IU/mL   Sjogrens syndrome-A extractable nuclear antibody   Result Value Ref Range    Anti SSA 0.4 <7.0 U/mL   Sjogrens syndrome-B extractable nuclear antibody   Result Value Ref Range    Anti SSB <0.3 <7.0 U/mL   Beta-2 Glycoprotein Antibodies   Result Value Ref Range    Beta 2 Glycoprot. 1 IgG Ab 1.0 0.0 - 7.0 Maryann U/mL    Beta 2 Glycoprot. 1 IgA Ab 1.7 0.0 - 7.0 Maryann U/mL    Beta 2 Glycoprot. 1 IgM Ab <0.9 0.0 - 7.0 Maryann U/mL   Lupus Anticoagulant   Result Value Ref Range    Anticardiolipin IgA 3.4 0.0 - 14.0 APL    Anticardiolipin IgG 2.1 0.0 - 10.0 GPL    Cardiolipin Ab IgM <0.8 0.0 - 10.0 MPL    Dilute Viper Venom Time NEGATIVE for Lupus Anticoagulant NEGATIVE for Lupus Anticoagulant    Lupus Anticoag NOT REPORTED     Protime 12.7 11.8 - 14.6 sec    INR 0.9     PTT 32.7 24.0 - 36.0 sec   Antithrombin III Activity   Result Value Ref Range    AT-III Activity 103 83 - 122 %   Factor 5 Leiden   Result Value Ref Range    Specimen Whole Blood     Factor V Mutation Negative    Homocysteine, Serum   Result Value Ref Range    Homocysteine 10.4 <15.0 umol/L   Protein C Antigen, Total   Result Value Ref Range    Protein C Antigen >95 63 - 153 %   Protein C Functional   Result Value Ref Range    Protein C Activity >150 >80 %   Protein S Antigen, Free   Result Value Ref Range    Protein S Ag, Free 101 55 - 123 %   Protein S Functional   Result Value Ref Range    Protein S Activity 112 59 - 130 %   T. pallidum Ab   Result Value Ref Range    T. pallidum, IgG NONREACTIVE NONREACTIVE           Assessment:   Diagnosis Orders   1. Pelvic pain     2. Abnormal facial hair     3. Bloating     4. Family history of blood clots     5. Hx of tubal ligation     6. Insulin resistance  metFORMIN (GLUCOPHAGE XR) 750 MG extended release tablet   7. PCO (polycystic ovaries)  metFORMIN (GLUCOPHAGE XR) 750 MG extended release tablet   8. Menorrhagia with regular cycle  TSH    T4, Free    Iron and TIBC    Ferritin    Transferrin    HCG, Quantitative, Pregnancy   9. Other specified hypothyroidism  TSH    T4, Free   10.  Endometrial thickening on ultrasound       Chief public health emergency), evaluation of the following organ systems was limited: Vitals/Constitutional/EENT/Resp/CV/GI//MS/Neuro/Skin/Heme-Lymph-Imm. Pursuant to the emergency declaration under the [facility], [address] [place] [address] and the [employer] Act, this Virtual Visit was conducted with patient's (and/or legal guardian's) consent, to reduce the patient's risk of exposure to COVID-19 and provide necessary medical care. The patient (and/or legal guardian) has also been advised to contact this office for worsening conditions or problems, and seek emergency medical treatment and/or call 911 if deemed necessary. Services were provided through a video synchronous discussion virtually to substitute for in-person clinic visit. Patient and provider were located at their individual homes. Electronically signed by Ld Segovia DO on 9/16/21 at 8:09 AM EDT     An electronic signature was used to authenticate this note. The patient, Naomi Crockett is a 29 y.o. female, was seen with a total time spent of 30 minutes for the visit on this date of service by the E/M provider. The time component had both face to face and non face to face time spent in determining the total time component. Counseling and education regarding her diagnosis listed below and her options regarding those diagnoses were also included in determining her time component. Diagnosis Orders   1. Pelvic pain     2. Abnormal facial hair     3. Bloating     4. Family history of blood clots     5. Hx of tubal ligation     6. Insulin resistance  metFORMIN (GLUCOPHAGE XR) 750 MG extended release tablet   7. PCO (polycystic ovaries)  metFORMIN (GLUCOPHAGE XR) 750 MG extended release tablet   8. Menorrhagia with regular cycle  TSH    T4, Free    Iron and TIBC    Ferritin    Transferrin    HCG, Quantitative, Pregnancy   9.  Other specified hypothyroidism  TSH    T4, Free   10. Endometrial thickening on ultrasound          The patient had her preventative health maintenance recommendations and follow-up reviewed with her at the completion of her visit.

## 2021-12-09 ENCOUNTER — PROCEDURE VISIT (OUTPATIENT)
Dept: OBGYN CLINIC | Age: 36
End: 2021-12-09
Payer: MEDICAID

## 2021-12-09 VITALS
DIASTOLIC BLOOD PRESSURE: 70 MMHG | SYSTOLIC BLOOD PRESSURE: 120 MMHG | WEIGHT: 252 LBS | BODY MASS INDEX: 38.19 KG/M2 | HEIGHT: 68 IN

## 2021-12-09 DIAGNOSIS — R10.2 PELVIC PAIN: ICD-10-CM

## 2021-12-09 DIAGNOSIS — E28.2 PCO (POLYCYSTIC OVARIES): ICD-10-CM

## 2021-12-09 DIAGNOSIS — E88.81 INSULIN RESISTANCE: ICD-10-CM

## 2021-12-09 DIAGNOSIS — Z98.51 H/O TUBAL LIGATION: ICD-10-CM

## 2021-12-09 DIAGNOSIS — Z32.02 NEGATIVE PREGNANCY TEST: ICD-10-CM

## 2021-12-09 DIAGNOSIS — N92.0 MENORRHAGIA WITH REGULAR CYCLE: Primary | ICD-10-CM

## 2021-12-09 DIAGNOSIS — R93.89 ENDOMETRIAL THICKENING ON ULTRASOUND: ICD-10-CM

## 2021-12-09 DIAGNOSIS — N94.6 DYSMENORRHEA: ICD-10-CM

## 2021-12-09 PROBLEM — E88.819 INSULIN RESISTANCE: Status: ACTIVE | Noted: 2021-12-09

## 2021-12-09 LAB
CONTROL: NORMAL
PREGNANCY TEST URINE, POC: NEGATIVE

## 2021-12-09 PROCEDURE — 99214 OFFICE O/P EST MOD 30 MIN: CPT | Performed by: OBSTETRICS & GYNECOLOGY

## 2021-12-09 PROCEDURE — 81025 URINE PREGNANCY TEST: CPT | Performed by: OBSTETRICS & GYNECOLOGY

## 2021-12-09 NOTE — PROGRESS NOTES
VIA [facility]  Flexible-3mm  Hysteroscopy Procedure Note      Loyda Sutton  2021  Patient's last menstrual period was 2021 (approximate). Chief Complaint   Patient presents with    Procedure   The pt arrived for her procedure had not eaten and was nervous to the point of not being able to complete the procedure. I reviewed the prep and asked if we could do it in 2022 late appointment. This was rescheduled to then. I discussed the need to get her records/pap from her previous physician in [place] and a release was signed. I reviewed her findings of PCO and Ins resistance with her dysmenorrhea and dyspareunia. Her pain is 2-3 days prior to   Her menses and is low sacral. No family hx of endometriosis. These symptoms most likely are endometriotic in origin. She will complete her labs as noted under plan. I offered her and explained to her options surgical with L-scope FOI/ELMIRA D&C and Ablation or Non surgical with hormonal manipulation IUD +/- Adamson Drop. All questions answered.     Past Medical History:   Diagnosis Date    Fibroid uterus     Thyroid disease        Past Surgical History:   Procedure Laterality Date     SECTION      TUBAL LIGATION       Family History   Problem Relation Age of Onset    Thyroid Disease Mother     Hypertension Mother     Other Father     Thyroid Disease Maternal Grandmother     Hypertension Brother     Thyroid Disease Maternal Aunt     Ovarian Cancer Maternal Aunt     Cervical Cancer Paternal Aunt      Social History     Tobacco Use    Smoking status: Never Smoker    Smokeless tobacco: Never Used   Vaping Use    Vaping Use: Never used   Substance Use Topics    Alcohol use: Not Currently    Drug use: Never       Current Outpatient Medications on File Prior to Visit   Medication Sig Dispense Refill    metFORMIN (GLUCOPHAGE XR) 750 MG extended release tablet Take 1 tablet by mouth daily (with breakfast) 30 tablet 3    venlafaxine (EFFEXOR XR) 75 MG extended release capsule TAKE ONE CAPSULE BY MOUTH WITH FOOD ONCE DAILY 30 capsule 5    levothyroxine (SYNTHROID) 75 MCG tablet Take 1 tablet by mouth Daily 90 tablet 1    ibuprofen (ADVIL;MOTRIN) 800 MG tablet Take 1 tablet by mouth 2 times daily as needed for Pain 60 tablet 0     No current facility-administered medications on file prior to visit. Allergies as of 12/09/2021 - Fully Reviewed 12/09/2021   Allergen Reaction Noted    Shellfish-derived products Hives 04/23/2021    Iodine  06/17/2021       Patient Active Problem List    Diagnosis Date Noted    Pelvic pain 12/09/2021     Priority: High    PCO (polycystic ovaries) 12/09/2021     Priority: High     Overview Note:     RX Metformin 750 mg bid      Endometrial thickening on ultrasound 12/09/2021     Priority: High     Overview Note:     Plan: Emb hscope in office 1/2022      Insulin resistance 12/09/2021     Priority: High    Dysmenorrhea 12/09/2021     Priority: High    Menorrhagia with regular cycle 12/09/2021     Priority: Medium    H/O tubal ligation 12/09/2021     Priority: Medium    Abnormal facial hair 07/16/2021     Priority: Medium    Family history of blood clots 07/16/2021     Priority: Medium         Blood pressure 120/70, height 5' 8\" (1.727 m), weight 252 lb (114.3 kg), last menstrual period 11/09/2021, not currently breastfeeding. UltraSound Findings:   Pelvic ultrasound with Doppler on 6/17/2021     Provided history: Midline pelvic pain. Comparison: None     Technique: Grayscale sonographic imaging of the pelvis was performed. Transabdominal imaging was used to assess the uterus and extrauterine structures. Transvaginal imaging was used to assess the pelvic structures in greater detail. Color flow imaging and duplex spectral Doppler waveform analysis   evaluation of the ovaries was performed with arterial and venous spectral waveforms obtained and reviewed in view of the clinical history of pelvic pain. FINDINGS:     Uterus retroverted on transvaginal imaging.  It measures 8.1 x 4.5 x 6.4 cm.  No myometrial mass.  Individual complex is partially 15 mm in thickness.  Minimal amount of free fluid in the posterior cul-de-sac. Right ovary is 3.5 x 3.0 x 2.6 cm.  Follicular changes are noted with dominant follicle measuring 1.5 x 1.3 x 1.3 cm.  Minimal right adnexal fluid.  Color Doppler demonstrates normal perfusion.  Arterial and venous spectral waveforms are normal.     Left ovary is 3.0 x 1.5 x 2.7 cm.  Follicular changes are noted.  No adnexal mass or free fluid.  Color Doppler demonstrates normal perfusion.  Normal arterial and venous spectral waveforms.      IMPRESSION:     No sonographic evidence of ovarian torsion or acute abnormality.            Lab Results:        Results for orders placed or performed during the hospital encounter of 31/94/05   Follicle Stimulating Hormone   Result Value Ref Range     FSH 6.7 1.7 - 21.5 U/L   Luteinizing Hormone   Result Value Ref Range     LH 10.5 1.0 - 95.6 U/L   Insulin, total   Result Value Ref Range     Insulin Comment 1,145       Insulin 75.5 mU/L     Insulin Reference Range:          Hemoglobin A1C   Result Value Ref Range     Hemoglobin A1C 5.4 4.0 - 6.0 %     Estimated Avg Glucose 108 mg/dL   Glucose, random   Result Value Ref Range     Glucose 81 70 - 99 mg/dL   ALT   Result Value Ref Range     ALT 11 5 - 33 U/L   AST   Result Value Ref Range     AST 13 <32 U/L   BUN & Creatinine   Result Value Ref Range     BUN 11 6 - 20 mg/dL     CREATININE 0.88 0.50 - 0.90 mg/dL     GFR Non-African American >60 >60 mL/min     GFR African American >60 >60 mL/min     GFR Comment            GFR Staging NOT REPORTED     Antithrombin III antigen   Result Value Ref Range     Anti-thrombin 3 Ag 99 82 - 136 %   SHARLENE   Result Value Ref Range     SHARLENE NEGATIVE NEGATIVE     ANURADHA Antibodies Screen 0.1 <0.7 U/mL     Anti ds DNA 2.6 <10.0 IU/mL   Sjogrens syndrome-A extractable nuclear antibody   Result Value Ref Range     Anti SSA 0.4 <7.0 U/mL   Sjogrens syndrome-B extractable nuclear antibody   Result Value Ref Range     Anti SSB <0.3 <7.0 U/mL   Beta-2 Glycoprotein Antibodies   Result Value Ref Range     Beta 2 Glycoprot. 1 IgG Ab 1.0 0.0 - 7.0 Maryann U/mL     Beta 2 Glycoprot. 1 IgA Ab 1.7 0.0 - 7.0 Maryann U/mL     Beta 2 Glycoprot. 1 IgM Ab <0.9 0.0 - 7.0 Maryann U/mL   Lupus Anticoagulant   Result Value Ref Range     Anticardiolipin IgA 3.4 0.0 - 14.0 APL     Anticardiolipin IgG 2.1 0.0 - 10.0 GPL     Cardiolipin Ab IgM <0.8 0.0 - 10.0 MPL     Dilute Viper Venom Time NEGATIVE for Lupus Anticoagulant NEGATIVE for Lupus Anticoagulant     Lupus Anticoag NOT REPORTED       Protime 12.7 11.8 - 14.6 sec     INR 0.9       PTT 32.7 24.0 - 36.0 sec   Antithrombin III Activity   Result Value Ref Range     AT-III Activity 103 83 - 122 %   Factor 5 Leiden   Result Value Ref Range     Specimen Whole Blood       Factor V Mutation Negative     Homocysteine, Serum   Result Value Ref Range     Homocysteine 10.4 <15.0 umol/L   Protein C Antigen, Total   Result Value Ref Range     Protein C Antigen >95 63 - 153 %   Protein C Functional   Result Value Ref Range     Protein C Activity >150 >80 %   Protein S Antigen, Free   Result Value Ref Range     Protein S Ag, Free 101 55 - 123 %   Protein S Functional   Result Value Ref Range     Protein S Activity 112 59 - 130 %   T. pallidum Ab   Result Value Ref Range     T. pallidum, IgG NONREACTIVE NONREACTIVE                LABS:  UPT: negative    No visits with results within 6 Week(s) from this visit. Latest known visit with results is:   Hospital Outpatient Visit on 07/16/2021   Component Date Value Ref Range Status    [place] [facility] 07/16/2021 6.7  1.7 - 21.5 U/L Final    Comment: Reference Range:  Male:                        1. 5-12.4  Ovulating Female:     Follicular Phase           3.5-12.5    Ovulation Phase            4.7-21.5    Luteal Phase 1.7-7.7  Postmenopausal Female:      25.8-134.8      LH 2021 10.5  1.0 - 95.6 U/L Final    Comment: Reference Range:  Male:                        1.7-8.6  Ovulating Female: Follicular Phase           2.4-12.6    Ovulation Phase           14.0-95.6    Luteal Phase               1.0-11.4  Postmenopausal Female:       7.7-58.5      Insulin Comment 2021 1,145   Final    Insulin 2021 75.5  mU/L Final    Insulin Reference Range: 2021        Final    Comment: Fastin.6-24.9  30 min:    60 min:  29-88  90 min:  26-84  120 min: 22-79      Hemoglobin A1C 2021 5.4  4.0 - 6.0 % Final    Estimated Avg Glucose 2021 108  mg/dL Final    Comment: The ADA and AACC recommend providing the estimated average glucose result to permit better   patient understanding of their HBA1c result.  Glucose 2021 81  70 - 99 mg/dL Final    ALT 2021 11  5 - 33 U/L Final    AST 2021 13  <32 U/L Final    BUN 2021 11  6 - 20 mg/dL Final    CREATININE 2021 0.88  0.50 - 0.90 mg/dL Final    GFR Non- 2021 >60  >60 mL/min Final    GFR  2021 >60  >60 mL/min Final    GFR Comment 2021        Final    Comment: Average GFR for 30-36 years old:   80 mL/min/1.73sq m  Chronic Kidney Disease:   <60 mL/min/1.73sq m  Kidney failure:   <15 mL/min/1.73sq m              eGFR calculated using average adult body mass. Additional eGFR calculator available at:        [email or web address]            GFR Staging 2021 NOT REPORTED   Final    Anti-thrombin 3 Ag 2021 99  82 - 136 % Final    Comment: (NOTE)  REFERENCE INTERVAL: Antithrombin Antigen  Access complete set of age- and/or gender-specific reference   intervals for this test in the [facility] Test Directory   ([email or web address]). Performed by [facility], [address] [address] [phone]  www. Radha LI Jose Espinal MD, Lab. Director      SHARLENE 07/16/2021 NEGATIVE  NEGATIVE Final    ANURADHA Antibodies Screen 07/16/2021 0.1  <0.7 U/mL Final    Comment:       Reference Range:  <0.7 Negative  0.7-1.0 Equivocal  >1.0 Positive        ANURADHA Screen includes U1RNP,RNP70,Sm,Ro(SS-A),La(SS-B),CENP,Scl-70,Aydee-1      Anti ds DNA 07/16/2021 2.6  <10.0 IU/mL Final    Comment:       Reference Range:  <10.0 Negative  10.0-15.0 Equivocal  >15.0 Positive            Anti SSA 07/16/2021 0.4  <7.0 U/mL Final    Comment:       Reference Range:  <7.0 Negative  7.0-10.0 Equivocal  >10.0 Positive            Anti SSB 07/16/2021 <0.3  <7.0 U/mL Final    Comment:       Reference Range:  <7.0 Negative  7.0-10.0 Equivocal  >10.0 Positive            Beta 2 Glycoprot. 1 IgG Ab 07/16/2021 1.0  0.0 - 7.0 Maryann U/mL Final    Comment:       Reference Range:      <7.0  Negative  7.0-10.0  Equivocal     >10.0  Positive            Beta 2 Glycoprot. 1 IgA Ab 07/16/2021 1.7  0.0 - 7.0 Maryann U/mL Final    Comment:       Reference Range:      <7.0  Negative  7.0-10.0  Equivocal     >10.0  Positive            Beta 2 Glycoprot. 1 IgM Ab 07/16/2021 <0.9  0.0 - 7.0 Maryann U/mL Final    Comment:       Reference Range:      <7.0  Negative  7.0-10.0  Equivocal     >10.0  Positive            Anticardiolipin IgA 07/16/2021 3.4  0.0 - 14.0 APL Final    Comment:       Reference Range:      <14.0 Negative  14.0-20.0 Equivocal      >20.0 Positive        When results are Equivocal, it is recommended to retest after 4-6 weeks.       Anticardiolipin IgG 07/16/2021 2.1  0.0 - 10.0 GPL Final    Comment:       Reference Range:      <10.0 Negative  10.0-40.0 Equivocal      >40.0 Positive      Cardiolipin Ab IgM 07/16/2021 <0.8  0.0 - 10.0 MPL Final    Comment:       Reference Range:      <10.0 Negative  10.0-40.0 Equivocal      >40.0 Positive      Dilute Viper Venom Time 07/16/2021 NEGATIVE for Lupus Anticoagulant  NEGATIVE for Lupus Anticoagulant Final    Lupus Anticoag 07/16/2021 NOT REPORTED   Final    Protime 07/16/2021 12.7  11.8 - 14.6 sec Final    INR 07/16/2021 0.9   Final    Comment:       Non-therapeutic Range:     INR = 0.9-1.2  Therapeutic Range: Moderate Anticoagulant Intensity:     INR = 2.0-3.0   High Anticoagulant Intensity:     INR = 2.5-3.5            PTT 07/16/2021 32.7  24.0 - 36.0 sec Final    Comment:       IV Heparin Therapy Range:      62.0-94.0            AT-III Activity 07/16/2021 103  83 - 122 % Final    Comment:       Patients receiving Hirudin may have a falsely decreased Antitrombin III Activity.  Specimen 07/16/2021 Whole Blood   Final    Factor V Mutation 07/16/2021 Negative   Final    Comment: (NOTE)  Indication for testing: Assess genetic risk for thrombosis. NEGATIVE: The factor V Leiden variant, c.1601G>A; p.[id number], was   not detected. This does not exclude a genetic cause for   thrombophilia. If this individual has had a previous venous   thromboembolism, this negative result is unlikely to significantly   reduce the risk for recurrence; thus, future clinical management   to reduce recurrence should not be altered. This result has been reviewed and approved by Faustino Dickey M.D., Ph.D.  BACKGROUND INFORMATION: Factor V Leiden (F5) R506Q Mutation  CHARACTERISTICS: Venous thromboembolism (VTE) is multifactorial   caused by a combination of genetic and environmental factors. The   Factor V Leiden (FVL) variant is the most common cause of   inherited VTEs, accounting for over 90 percent of activated   protein C (APC) resistance. Because the FVL variant eliminates the   APC cleavage site, factor V is inactivated slower, thus persisting   longer in blood circulation                           , leading to more thrombin production. Other genetic risk factors for VTE include, male sex and variants   in antithrombin, protein C, protein S, or factor XIII.  Non-genetic   risk factors include, age, smoking, prolonged immobilization, malignant neoplasms, surgery, pregnancy, oral contraceptives,   estrogen replacement therapy, tamoxifen and raloxifene therapy. INCIDENCE OF FACTOR V LEIDEN VARIANT: Approximately 5 percent of   Caucasians, 2 percent of Hispanics, 1 percent of  Americans   and 0.5 percent of Asians are  heterozygous; homozygosity occurs in 1 in 1500 Caucasians. INHERITANCE: Semi-dominant; both heterozygotes and homozygotes are   at increased risk for VTE. PENETRANCE: Lifetime risk of VTE is 10 percent for heterozygotes   and 80 percent of homozygotes. CAUSE: The pathogenic gain of function in the F5 gene variant   c.1601G>A (p.[id number]). Legacy nomenclature: H327T (1691G>A)   CLINICAL SENSITIVITY: 20-50 percent of individuals with an   isolated VTE have                            the FVL variant. METHODOLOGY: Polymerase chain reaction and fluorescence monitoring. ANALYTICAL SENSITIVITY AND SPECIFICITY: 99 percent. LIMITATIONS: Diagnostic errors can occur due to rare sequence   variations. F5 gene mutations, other than p. [id number], will not be   detected. This test was developed and its performance characteristics   determined by [facility]. It has not been cleared or   approved by the [employer] and Drug Administration. This test was   performed in a CLIA certified laboratory and is intended for   clinical purposes. Counseling and informed consent are recommended for genetic   testing. Consent forms are available online. Performed by [facility] , [id number] [facility] [phone]  www. Radha Deluca MD, Lab. Director      Homocysteine 07/16/2021 10.4  <15.0 umol/L Final    Protein C Antigen 07/16/2021 >95  63 - 153 % Final    Comment: (NOTE)  INTERPRETIVE INFORMATION: Protein C, Total Antigen  Patients on warfarin may have decreased protein C values. Patients   should be off warfarin therapy for two weeks for accurate   measurement of protein C.   Access complete set of age- Factor   VIII.            T. pallidum, IgG 07/16/2021 NONREACTIVE  NONREACTIVE Final    Comment:       T. pallidum antibodies are not detected. There is no serological evidence of infection with T. pallidum (early primary syphilis   cannot be excluded). Retest in 2-4 weeks if syphilis is clinically suspect.           ]    Diagnosis:    Diagnosis Orders   1. Menorrhagia with regular cycle     2. Pelvic pain     3. PCO (polycystic ovaries)     4. Endometrial thickening on ultrasound     5. Negative pregnancy test  POCT urine pregnancy   6. Insulin resistance     7. Dysmenorrhea     8. H/O tubal ligation       9. Suspected Endometriosis        Patient Active Problem List    Diagnosis Date Noted    Pelvic pain 12/09/2021     Priority: High    PCO (polycystic ovaries) 12/09/2021     Priority: High     RX Metformin 750 mg bid      Endometrial thickening on ultrasound 12/09/2021     Priority: High     Plan: Emb hscope in office 1/2022      Insulin resistance 12/09/2021     Priority: High    Dysmenorrhea 12/09/2021     Priority: High    Menorrhagia with regular cycle 12/09/2021     Priority: Medium    H/O tubal ligation 12/09/2021     Priority: Medium    Abnormal facial hair 07/16/2021     Priority: Medium    Family history of blood clots 07/16/2021     Priority: Medium       Chaperone for Intimate Exam   Chaperone was offered and accepted as part of the rooming process.  Chaperone: Rubens WESTBROOK time out was called by the Chaperone listed above while ALL participants were quiet and attentive. The procedure to be completed was confirmed, with the appropriate laterality demarcated prior, if appropriate, as well as the patients name and a unique identifier, her date of birth. All questions were answered to her satisfaction. The consent was signed prior to the time out and all the risks were discussed in detail.   The pt decided as she was overwhelmed not to proceed today with the procedure and have it completed in 1/2022 with the appropriate prep done. Assessment:   Diagnosis Orders   1. Menorrhagia with regular cycle     2. Pelvic pain     3. PCO (polycystic ovaries)     4. Endometrial thickening on ultrasound     5. Negative pregnancy test  POCT urine pregnancy   6. Insulin resistance     7. Dysmenorrhea     8. H/O tubal ligation     8. Suspected Endometriosis      Patient Active Problem List    Diagnosis Date Noted    Pelvic pain 12/09/2021     Priority: High    PCO (polycystic ovaries) 12/09/2021     Priority: High     Overview Note:     RX Metformin 750 mg bid      Endometrial thickening on ultrasound 12/09/2021     Priority: High     Overview Note:     Plan: Emb hscope in office 1/2022      Insulin resistance 12/09/2021     Priority: High    Dysmenorrhea 12/09/2021     Priority: High    Menorrhagia with regular cycle 12/09/2021     Priority: Medium    H/O tubal ligation 12/09/2021     Priority: Medium    Abnormal facial hair 07/16/2021     Priority: Medium    Family history of blood clots 07/16/2021     Priority: Medium           Recommendations:  Return to the office for follow-up in 4 weeks to complete emb hscope and cultures     I offered her and explained to her options surgical with L-scope FOI/ELMIRA D&C and Ablation or Non surgical with hormonal manipulation IUD +/- [place]. All questions answered. NSAID usage recs reviewed    Iron studies and MTHFR along with Prothrombin [id number] is pending-pt to complete today    Records from previous physician in [facility] to send-NEED PAP    The patient, Apolinar Bonds is a 29 y.o. female, was seen with a total time spent of 30 minutes for the visit on this date of service by the E/M provider. The time component had both face to face and non face to face time spent in determining the total time component.   Counseling and education regarding her diagnosis listed below and her options regarding those diagnoses were also included in determining her time component. Diagnosis Orders   1. Menorrhagia with regular cycle     2. Pelvic pain     3. PCO (polycystic ovaries)     4. Endometrial thickening on ultrasound     5. Negative pregnancy test  POCT urine pregnancy   6. Insulin resistance     7. Dysmenorrhea     8. H/O tubal ligation          The patient had her preventative health maintenance recommendations and follow-up reviewed with her at the completion of her visit.       Edd Clancy DO

## 2022-01-20 ENCOUNTER — HOSPITAL ENCOUNTER (OUTPATIENT)
Age: 37
Setting detail: SPECIMEN
Discharge: HOME | End: 2022-01-20

## 2022-01-20 ENCOUNTER — HOSPITAL ENCOUNTER (OUTPATIENT)
Age: 37
Discharge: HOME | End: 2022-01-20
Payer: MEDICAID

## 2022-01-20 ENCOUNTER — PROCEDURE VISIT (OUTPATIENT)
Dept: OBGYN CLINIC | Age: 37
End: 2022-01-20
Payer: MEDICAID

## 2022-01-20 VITALS
WEIGHT: 252 LBS | DIASTOLIC BLOOD PRESSURE: 84 MMHG | BODY MASS INDEX: 38.19 KG/M2 | SYSTOLIC BLOOD PRESSURE: 122 MMHG | HEIGHT: 68 IN

## 2022-01-20 DIAGNOSIS — E88.81 INSULIN RESISTANCE: ICD-10-CM

## 2022-01-20 DIAGNOSIS — E28.2 PCO (POLYCYSTIC OVARIES): ICD-10-CM

## 2022-01-20 DIAGNOSIS — Z98.51 H/O TUBAL LIGATION: ICD-10-CM

## 2022-01-20 DIAGNOSIS — E03.8 OTHER SPECIFIED HYPOTHYROIDISM: ICD-10-CM

## 2022-01-20 DIAGNOSIS — N94.6 DYSMENORRHEA: ICD-10-CM

## 2022-01-20 DIAGNOSIS — N76.1 SUBACUTE VAGINITIS: ICD-10-CM

## 2022-01-20 DIAGNOSIS — Z32.02 NEGATIVE PREGNANCY TEST: ICD-10-CM

## 2022-01-20 DIAGNOSIS — N92.0 MENORRHAGIA WITH REGULAR CYCLE: ICD-10-CM

## 2022-01-20 DIAGNOSIS — R93.89 ENDOMETRIAL THICKENING ON ULTRASOUND: ICD-10-CM

## 2022-01-20 DIAGNOSIS — N92.0 MENORRHAGIA WITH REGULAR CYCLE: Primary | ICD-10-CM

## 2022-01-20 LAB
CONTROL: NORMAL
HCG QUANTITATIVE: <1 IU/L
PREGNANCY TEST URINE, POC: NEGATIVE
THYROXINE, FREE: 1.21 NG/DL (ref 0.93–1.7)
TSH SERPL DL<=0.05 MIU/L-ACNC: 0.62 MIU/L (ref 0.3–5)

## 2022-01-20 PROCEDURE — 84702 CHORIONIC GONADOTROPIN TEST: CPT

## 2022-01-20 PROCEDURE — 58100 BIOPSY OF UTERUS LINING: CPT | Performed by: OBSTETRICS & GYNECOLOGY

## 2022-01-20 PROCEDURE — 84443 ASSAY THYROID STIM HORMONE: CPT

## 2022-01-20 PROCEDURE — 81025 URINE PREGNANCY TEST: CPT | Performed by: OBSTETRICS & GYNECOLOGY

## 2022-01-20 PROCEDURE — 84466 ASSAY OF TRANSFERRIN: CPT

## 2022-01-20 PROCEDURE — 83540 ASSAY OF IRON: CPT

## 2022-01-20 PROCEDURE — 83550 IRON BINDING TEST: CPT

## 2022-01-20 PROCEDURE — 36415 COLL VENOUS BLD VENIPUNCTURE: CPT

## 2022-01-20 PROCEDURE — 84439 ASSAY OF FREE THYROXINE: CPT

## 2022-01-20 PROCEDURE — 82728 ASSAY OF FERRITIN: CPT

## 2022-01-20 NOTE — PROGRESS NOTES
2022    Pushpa Moore is a 40 y.o. female, Dalia Ayers      Patient's last menstrual period was 2022. Chief Complaint   Patient presents with    Procedure       LABS:  UPT: negative    No visits with results within 6 Week(s) from this visit. Latest known visit with results is:   Procedure visit on 2021   Component Date Value Ref Range Status    Preg Test, Ur 2021 negative   Final    ucg lot#[id number] exp23    Control 2021 done   Final   ]    Past Medical History:   Diagnosis Date    Fibroid uterus 2018    Thyroid disease          Past Surgical History:   Procedure Laterality Date     SECTION      TUBAL LIGATION           Family History   Problem Relation Age of Onset    Thyroid Disease Mother     Hypertension Mother     Other Father     Thyroid Disease Maternal Grandmother     Hypertension Brother     Thyroid Disease Maternal Aunt     Ovarian Cancer Maternal Aunt     Cervical Cancer Paternal Aunt          Social History     Tobacco Use    Smoking status: Never Smoker    Smokeless tobacco: Never Used   Vaping Use    Vaping Use: Never used   Substance Use Topics    Alcohol use: Not Currently    Drug use: Never         Current Outpatient Medications   Medication Sig Dispense Refill    ALPRAZolam (XANAX) 0.25 MG tablet Take 1 tablet by mouth 3 times daily as needed for Anxiety for up to 30 days. 5 tablet 0    levothyroxine (SYNTHROID) 75 MCG tablet Take 1 tablet by mouth Daily 90 tablet 1    metFORMIN (GLUCOPHAGE) 500 MG tablet Take 1 tablet by mouth daily (with breakfast) 90 tablet 5    venlafaxine (EFFEXOR XR) 75 MG extended release capsule TAKE ONE CAPSULE BY MOUTH WITH FOOD ONCE DAILY 30 capsule 5    ibuprofen (ADVIL;MOTRIN) 800 MG tablet Take 1 tablet by mouth 2 times daily as needed for Pain 60 tablet 0     No current facility-administered medications for this visit.          Allergies as of 2022 - Fully Reviewed 2022   Allergen Reaction Noted    Shellfish-derived products Hives 04/23/2021    Iodine  06/17/2021         Diagnostics:  Uterus retroverted on transvaginal imaging.  It measures 8.1 x 4.5 x 6.4 cm.  No myometrial mass.  Individual complex is partially 15 mm in thickness.  Minimal amount of free fluid in the posterior cul-de-sac. Right ovary is 3.5 x 3.0 x 2.6 cm.  Follicular changes are noted with dominant follicle measuring 1.5 x 1.3 x 1.3 cm.  Minimal right adnexal fluid.  Color Doppler demonstrates normal perfusion.  Arterial and venous spectral waveforms are normal.     Left ovary is 3.0 x 1.5 x 2.7 cm.  Follicular changes are noted.  No adnexal mass or free fluid.  Color Doppler demonstrates normal perfusion.  Normal arterial and venous spectral waveforms. IMPRESSION:     No sonographic evidence of ovarian torsion or acute abnormality. Blood pressure 122/84, height 5' 8\" (1.727 m), weight 252 lb (114.3 kg), last menstrual period 01/19/2022, not currently breastfeeding. Chaperone for Intimate Exam   Chaperone was offered and accepted as part of the rooming process.  Chaperone: Emerald        A time out was called by the Chaperone listed above while ALL participants were quiet and attentive. The procedure to be completed was confirmed, with the appropriate laterality demarcated prior, if appropriate, as well as the patients name and a unique identifier, her date of birth. All questions were answered to her satisfaction. The consent was signed prior to the time out and all the risks were discussed in detail. The patient was counseled on the procedure. Risks, benefits and alternatives were reviewed. The patient is aware that this is diagnostic and not curative and a second procedure may be needed. A consent was reviewed and obtained. The patient was positioned comfortably on the exam table. After a bi-manual exam; the uterus was found to be  anteverted with a size of 7 cm.  There was no adnexal masses and the bladder was smooth, non-tender and without palpable masses. A sterile speculum was placed into the vagina and the cervix was identified. It was stabilized with an allis clamp. It was cleansed with betadine and the aspirator was then gently passed into the endometrial cavity. Tissue was obtained and sent to pathology. The patient tolerated the procedure well. Post procedure restrictions were reviewed and given to the patient. .  All counts and instruments were correct at the end of the procedure. Assessment:   Diagnosis Orders   1. Menorrhagia with regular cycle  48275 - MA BIOPSY OF UTERUS LINING    Specimen to Pathology Outpatient    POCT urine pregnancy    PAP Smear   2. Negative pregnancy test  63219 - MA BIOPSY OF UTERUS LINING    Specimen to Pathology Outpatient    POCT urine pregnancy   3. Dysmenorrhea  33895 - MA BIOPSY OF UTERUS LINING    Specimen to Pathology Outpatient    POCT urine pregnancy   4. PCO (polycystic ovaries)     5. Endometrial thickening on ultrasound     6. Insulin resistance     7. H/O tubal ligation     8. Subacute vaginitis  VAGINITIS DNA PROBE    C.trachomatis N.gonorrhoeae DNA   9.       Suspected Endometriosis component      Patient Active Problem List    Diagnosis Date Noted    Pelvic pain 12/09/2021     Priority: High    PCO (polycystic ovaries) 12/09/2021     Priority: High     RX Metformin 750 mg bid      Endometrial thickening on ultrasound 12/09/2021     Priority: High     Plan: Emb hscope in office 1/2022      Insulin resistance 12/09/2021     Priority: High    Dysmenorrhea 12/09/2021     Priority: High    Menorrhagia with regular cycle 12/09/2021     Priority: Medium    H/O tubal ligation 12/09/2021     Priority: Medium    Abnormal facial hair 07/16/2021     Priority: Medium    Family history of blood clots 07/16/2021     Priority: Medium           PLAN:  Return to the office for follow-up in 3 weeks to review the pathology of the biopsy and for further recommendations. Patient is considering Trinidad Echeverria reviewed. Declined ablation     I offered her and explained to her options surgical with L-scope FOI/ELMIRA D&C and Ablation or Non surgical with hormonal manipulation IUD +/- Joyce Current. All questions answered.     NSAID usage recs reviewed      MTHFR along with Prothrombin U53642i is pending-pt to complete after last visit still not done     Records from previous physician in [facility] license of [facility] to send-NEED PAP (negative 6/2016)-No HPV collected    Cultures and pap collected today  EMB done  Pt to complete MTHFR and Prothrombin [id number]    To get IUD all must be resulted  1. Pap  2. Cultures  3.  Labs MTHFR and Prothrombin [id number]

## 2022-01-21 ENCOUNTER — TELEPHONE (OUTPATIENT)
Dept: OBGYN CLINIC | Age: 37
End: 2022-01-21

## 2022-01-21 LAB
C TRACH DNA GENITAL QL NAA+PROBE: NEGATIVE
CANDIDA SPECIES, DNA PROBE: NEGATIVE
FERRITIN: 84 UG/L (ref 13–150)
GARDNERELLA VAGINALIS, DNA PROBE: POSITIVE
HPV SAMPLE: NORMAL
HPV, GENOTYPE 16: NOT DETECTED
HPV, GENOTYPE 18: NOT DETECTED
HPV, HIGH RISK OTHER: NOT DETECTED
HPV, INTERPRETATION: NORMAL
IRON SATURATION: 16 % (ref 20–55)
IRON: 48 UG/DL (ref 37–145)
N. GONORRHOEAE DNA: NEGATIVE
SOURCE: ABNORMAL
SPECIMEN DESCRIPTION: NORMAL
SPECIMEN DESCRIPTION: NORMAL
TOTAL IRON BINDING CAPACITY: 296 UG/DL (ref 250–450)
TRANSFERRIN: 211 MG/DL (ref 200–360)
TRICHOMONAS VAGINALIS DNA: NEGATIVE
UNSATURATED IRON BINDING CAPACITY: 248 UG/DL (ref 112–347)

## 2022-01-21 RX ORDER — FERROUS SULFATE 325(65) MG
325 TABLET ORAL 2 TIMES DAILY
Qty: 60 TABLET | Refills: 3 | Status: SHIPPED | OUTPATIENT
Start: 2022-01-21 | End: 2022-10-14 | Stop reason: SDUPTHER

## 2022-01-21 RX ORDER — METRONIDAZOLE 500 MG/1
500 TABLET ORAL 2 TIMES DAILY
Qty: 14 TABLET | Refills: 0 | Status: SHIPPED | OUTPATIENT
Start: 2022-01-21 | End: 2022-01-28

## 2022-01-21 NOTE — TELEPHONE ENCOUNTER
Patient notified of results and recommendations, script for ferrous sulfate 325 to [facility] [address].

## 2022-01-21 NOTE — TELEPHONE ENCOUNTER
----- Message from Ayden Farrell DO sent at 1/21/2022 10:26 AM EST -----  RX FESO4 325 bid unless receiving venofer

## 2022-01-21 NOTE — TELEPHONE ENCOUNTER
----- Message from NELLY Cooley NP sent at 1/21/2022  9:40 AM EST -----  + BV  Flagyl 500mg PO BID x 7 days

## 2022-01-24 LAB — SURGICAL PATHOLOGY REPORT: NORMAL

## 2022-01-27 DIAGNOSIS — Z30.09 FAMILY PLANNING: Primary | ICD-10-CM

## 2022-02-02 LAB — CYTOLOGY REPORT: NORMAL

## 2022-05-28 DIAGNOSIS — F32.A DEPRESSION, UNSPECIFIED DEPRESSION TYPE: ICD-10-CM

## 2022-05-31 NOTE — TELEPHONE ENCOUNTER
Last visit: 12/22/21  Last Med refill: 12/22/21  Does patient have enough medication for 72 hours: Yes    Next Visit Date:  No future appointments.     Health Maintenance   Topic Date Due    COVID-19 Vaccine (1) Never done    HIV screen  Never done    Hepatitis C screen  Never done    DTaP/Tdap/Td vaccine (1 - Tdap) Never done    Depression Screen  05/11/2022    Flu vaccine (Season Ended) 09/01/2022    Cervical cancer screen  01/20/2027    Hepatitis A vaccine  Aged Out    Hepatitis B vaccine  Aged Out    Hib vaccine  Aged Out    Meningococcal (ACWY) vaccine  Aged Out    Pneumococcal 0-64 years Vaccine  Aged Out    Varicella vaccine  Discontinued       Hemoglobin A1C (%)   Date Value   07/16/2021 5.4             ( goal A1C is < 7)   No results found for: LABMICR  No results found for: LDLCHOLESTEROL, LDLCALC    (goal LDL is <100)   AST (U/L)   Date Value   07/16/2021 13     ALT (U/L)   Date Value   07/16/2021 11     BUN (mg/dL)   Date Value   07/16/2021 11     BP Readings from Last 3 Encounters:   01/20/22 122/84   12/09/21 120/70   07/16/21 120/74          (goal 120/80)    All Future Testing planned in [facility]  Lab Frequency Next Occurrence   Ferritin Once 01/13/2022   Transferrin Once 01/13/2022   Iron And TIBC Once 01/13/2022   TSH With Reflex Ft4 Once 01/13/2022               Patient Active Problem List:     Abnormal facial hair     Family history of blood clots     Pelvic pain     PCO (polycystic ovaries)     Endometrial thickening on ultrasound     Insulin resistance     Menorrhagia with regular cycle     Dysmenorrhea     H/O tubal ligation

## 2022-06-01 RX ORDER — VENLAFAXINE HYDROCHLORIDE 75 MG/1
CAPSULE, EXTENDED RELEASE ORAL
Qty: 30 CAPSULE | Refills: 5 | Status: SHIPPED | OUTPATIENT
Start: 2022-06-01 | End: 2022-10-14 | Stop reason: SDUPTHER

## 2022-06-01 RX ORDER — LEVOTHYROXINE SODIUM 0.07 MG/1
75 TABLET ORAL DAILY
Qty: 90 TABLET | Refills: 1 | Status: SHIPPED | OUTPATIENT
Start: 2022-06-01 | End: 2022-10-14 | Stop reason: SDUPTHER

## 2022-06-03 ENCOUNTER — NURSE TRIAGE (OUTPATIENT)
Dept: OTHER | Facility: CLINIC | Age: 37
End: 2022-06-03

## 2022-07-20 DIAGNOSIS — Z30.09 FAMILY PLANNING: Primary | ICD-10-CM

## 2022-08-04 ENCOUNTER — TELEPHONE (OUTPATIENT)
Dept: OBGYN CLINIC | Age: 37
End: 2022-08-04
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
## 2022-08-29 ENCOUNTER — HOSPITAL ENCOUNTER (EMERGENCY)
Facility: CLINIC | Age: 37
Discharge: HOME | End: 2022-08-29
Attending: EMERGENCY MEDICINE
Payer: MEDICAID

## 2022-08-29 VITALS
WEIGHT: 250 LBS | HEART RATE: 82 BPM | HEIGHT: 68 IN | TEMPERATURE: 98.3 F | RESPIRATION RATE: 18 BRPM | SYSTOLIC BLOOD PRESSURE: 155 MMHG | OXYGEN SATURATION: 99 % | DIASTOLIC BLOOD PRESSURE: 100 MMHG | BODY MASS INDEX: 37.89 KG/M2

## 2022-08-29 DIAGNOSIS — N93.9 ABNORMAL VAGINAL BLEEDING: Primary | ICD-10-CM

## 2022-08-29 LAB
ABSOLUTE EOS #: 0.3 K/UL (ref 0–0.4)
ABSOLUTE LYMPH #: 2.5 K/UL (ref 1–4.8)
ABSOLUTE MONO #: 0.6 K/UL (ref 0.1–1.2)
ANION GAP SERPL CALCULATED.3IONS-SCNC: 8 MMOL/L (ref 9–17)
BASOPHILS # BLD: 1 % (ref 0–2)
BASOPHILS ABSOLUTE: 0.1 K/UL (ref 0–0.2)
BUN BLDV-MCNC: 20 MG/DL (ref 6–20)
CALCIUM SERPL-MCNC: 9.2 MG/DL (ref 8.6–10.4)
CHLORIDE BLD-SCNC: 105 MMOL/L (ref 98–107)
CO2: 28 MMOL/L (ref 20–31)
CREAT SERPL-MCNC: 1 MG/DL (ref 0.5–0.9)
EOSINOPHILS RELATIVE PERCENT: 3 % (ref 1–4)
GFR AFRICAN AMERICAN: >60 ML/MIN
GFR NON-AFRICAN AMERICAN: >60 ML/MIN
GFR SERPL CREATININE-BSD FRML MDRD: ABNORMAL ML/MIN/{1.73_M2}
GLUCOSE BLD-MCNC: 98 MG/DL (ref 70–99)
HCG QUALITATIVE: NEGATIVE
HCT VFR BLD CALC: 35.3 % (ref 36–46)
HEMOGLOBIN: 11.6 G/DL (ref 12–16)
LYMPHOCYTES # BLD: 25 % (ref 24–44)
MCH RBC QN AUTO: 28.6 PG (ref 26–34)
MCHC RBC AUTO-ENTMCNC: 32.9 G/DL (ref 31–37)
MCV RBC AUTO: 87 FL (ref 80–100)
MONOCYTES # BLD: 6 % (ref 2–11)
PDW BLD-RTO: 12.8 % (ref 12.5–15.4)
PLATELET # BLD: 274 K/UL (ref 140–450)
PMV BLD AUTO: 7.7 FL (ref 6–12)
POTASSIUM SERPL-SCNC: 4.1 MMOL/L (ref 3.7–5.3)
RBC # BLD: 4.06 M/UL (ref 4–5.2)
SEG NEUTROPHILS: 65 % (ref 36–66)
SEGMENTED NEUTROPHILS ABSOLUTE COUNT: 6.8 K/UL (ref 1.8–7.7)
SODIUM BLD-SCNC: 141 MMOL/L (ref 135–144)
WBC # BLD: 10.3 K/UL (ref 3.5–11)

## 2022-08-29 PROCEDURE — 99283 EMERGENCY DEPT VISIT LOW MDM: CPT

## 2022-08-29 PROCEDURE — 80048 BASIC METABOLIC PNL TOTAL CA: CPT

## 2022-08-29 PROCEDURE — 84703 CHORIONIC GONADOTROPIN ASSAY: CPT

## 2022-08-29 PROCEDURE — 85025 COMPLETE CBC W/AUTO DIFF WBC: CPT

## 2022-08-29 PROCEDURE — 36415 COLL VENOUS BLD VENIPUNCTURE: CPT

## 2022-08-29 ASSESSMENT — PAIN - FUNCTIONAL ASSESSMENT: PAIN_FUNCTIONAL_ASSESSMENT: NONE - DENIES PAIN

## 2022-08-30 ENCOUNTER — TELEPHONE (OUTPATIENT)
Dept: OBGYN CLINIC | Age: 37
End: 2022-08-30

## 2022-08-30 ENCOUNTER — OFFICE VISIT (OUTPATIENT)
Dept: OBGYN CLINIC | Age: 37
End: 2022-08-30
Payer: MEDICAID

## 2022-08-30 VITALS
DIASTOLIC BLOOD PRESSURE: 82 MMHG | BODY MASS INDEX: 36.89 KG/M2 | WEIGHT: 243.4 LBS | HEART RATE: 89 BPM | SYSTOLIC BLOOD PRESSURE: 130 MMHG | HEIGHT: 68 IN

## 2022-08-30 DIAGNOSIS — R10.2 PELVIC PAIN: ICD-10-CM

## 2022-08-30 DIAGNOSIS — N93.9 ABNORMAL UTERINE BLEEDING: Primary | ICD-10-CM

## 2022-08-30 DIAGNOSIS — N94.6 DYSMENORRHEA: ICD-10-CM

## 2022-08-30 PROCEDURE — 99213 OFFICE O/P EST LOW 20 MIN: CPT | Performed by: STUDENT IN AN ORGANIZED HEALTH CARE EDUCATION/TRAINING PROGRAM

## 2022-08-30 NOTE — ED PROVIDER NOTES
Pt Name: Sydni Leach  MRN: [id number]  [place] 1985  Date of evaluation: 8/29/22       Sydni Leach is a 40 y.o. female who presents with Vaginal Bleeding (Started two days ago. Pt reports she had a normal period two weeks ago; pt reports she is bleeding through a pad every hour. )        Based on the medical record, the care appears appropriate. I was personally available for consultation in the Emergency Department.     Diane Virk MD  Attending Emergency  Physician        Diane Virk MD  08/29/22 203

## 2022-08-30 NOTE — ED NOTES
Pt to ED with c/o heavy vaginal bleeding. Pt reports it started two days ago. Pt reports she had a normal period about two weeks ago. Pt reports she is bleeding through a pad every hour and has noticed \"quarter-sized clots. \"  Pt denies any pain or any other complaints at this time. Pt A&Ox4. Pt sitting on cot. RR even and non labored. NAD noted at this time. Warm blanket provided. Call light within reach.      Brett Connor RN  08/29/22 [id number]

## 2022-08-30 NOTE — PROGRESS NOTES
Roxie Paniagua  2022    YOB: 1985    HPI:  Roxie Paniagua is a 40 y.o. female [id number]   The patient was seen and examined today. She is here regarding her heavy menstrual cycles. Reports she has been dealing with her abnormal uterine bleeding for years and cannot keep living her life like this. States she started her periods when she was 16years old. They occurred monthly and were regular until she had her tubal ligation in 2018. Since then, she has very heavy periods where she sometimes uses 18 pads in one day. She becomes very fatigue and experiences cramping/dyschezia when she is on her period. She was on low dose progesterone in the past which controlled her cycle. She had a Mirena IUD for a few years but ultimately had it removed due to chronic BV infections and concern for perforation. Her LMP was . She started to have an episode of bleeding 3 days ago. She was passing quarter size blood clots for the first 2 days but today is starting to lighten up. Today she denies any lightheadedness, dizziness or shortness of breath. She is taking iron supplementation.         OB History    Para Term  AB Living   3 3 3 0 0 3   SAB IAB Ectopic Molar Multiple Live Births   0 0 0 0 0 3      # Outcome Date GA Lbr Eldon/2nd Weight Sex Delivery Anes PTL Lv   3 Term 2018    F CS-LTranv   SILVINO   2 Term 2016 37w0d   F Vag-Spont   SILVINO   1 Term     F Vag-Spont   SILVINO       Past Medical History:   Diagnosis Date    Endometriosis     Fibroid uterus     PCOS (polycystic ovarian syndrome)     Thyroid disease        Past Surgical History:   Procedure Laterality Date     SECTION      TUBAL LIGATION         Family History   Problem Relation Age of Onset    Thyroid Disease Mother     Hypertension Mother     Other Father     Thyroid Disease Maternal Grandmother     Hypertension Brother     Thyroid Disease Maternal Aunt     Ovarian Cancer Maternal Aunt     Cervical Cancer Paternal negative visual disturbances, negative headaches   Breast: Negative breast abnormalities, negative breast lumps   Respiratory: negative dyspnea, negative cough, negative SOB  Cardiovascular: negative chest pain,  negative palpitations, negative arrhythmia  Gastrointestinal: negative abdominal pain, negative N/V, negative diarrhea, negative constipation, negative bowel changes, negative heartburn   Genitourinary: negative dysuria, negative hematuria, negative urinary incontinence, negative vaginal discharge, positive vaginal bleeding   Dermatological: negative rash, negative pruritis   Hematologic: negative bruising  Immunologic/Lymphatic: negative recent illness, negative recent sick contact  Musculoskeletal: negative back pain, negative myalgias, negative arthralgias  Neurological:  negative dizziness, negative migraines   Behavior/Psych:  negative SI, negative HI    VITALS:  Vitals:    08/30/22 0846   BP: 130/82   Site: Left Upper Arm   Position: Sitting   Cuff Size: Large Adult   Pulse: 89   Weight: 243 lb 6.4 oz (110.4 kg)   Height: 5' 8\" (1.727 m)     PHYSICAL EXAM:  General appearance: no apparent distress, alert and cooperative  HEENT: head atraumatic, normocephalic, trachea midline, moist mucous membranes   Neurologic: alert, oriented, normal speech, no focal findings or movement disorder noted  Lungs: no increased work of breathing, good air exchange, clear to auscultation bilaterally, no crackles or wheezing  Heart: regular rate and rhythm   Abdomen: soft, non-tender on palpation,  no guarding, no rebound, no rigidity   Extremities:  no calf tenderness bilaterally, non-edematous bilaterally   Musculoskeletal: no gross abnormalities, range of motion appropriate for age   Psychiatric: mood appropriate, normal affect   Pelvic Exam: Chaperone was offered and patient declined    Vulva: normal appearing vulva, no masses, tenderness or lesions, normal clitoris    Vagina: normal appearing urethral meatus, normal Pelvic exam showing moderate amount of bleeding from os    - Patient desires surgical management of her abnormal uterine bleeding with a hysterectomy. Her daughter is undergoing surgery late September so patient wants to hold on scheduling her surgery until after the new year. In the mean time with treat her heavy bleeding with Aygestin 5 mg QD    - Will likely need to repeat pelvic ultrasound prior to surgery    - Explained to patient that a hysterectomy will not necessarily resolve her pelvic pain if she truly has endometriosis. Patient states she understands but the heavy bleeding is more of a concern for her that she wants resolved    - Follow up in 3 months to re assess her bleeding and schedule hysterectomy      Diagnosis Orders   1. Abnormal uterine bleeding  norethindrone (AYGESTIN) 5 MG tablet      2. Dysmenorrhea  norethindrone (AYGESTIN) 5 MG tablet      3. Pelvic pain  norethindrone (AYGESTIN) 5 MG tablet        Patient Active Problem List    Diagnosis Date Noted    Pelvic pain 12/09/2021    PCO (polycystic ovaries) 12/09/2021     RX Metformin 750 mg bid      Endometrial thickening on ultrasound 12/09/2021     Plan: Emb hscope in office 1/2022      Insulin resistance 12/09/2021    Menorrhagia with regular cycle 12/09/2021    Dysmenorrhea 12/09/2021    H/O tubal ligation 12/09/2021    Abnormal facial hair 07/16/2021    Family history of blood clots 07/16/2021         Patient was seen with total face to face time of 25 minutes. More than 50% of this visit was counseling and education regarding The primary encounter diagnosis was Abnormal uterine bleeding. Diagnoses of Dysmenorrhea and Pelvic pain were also pertinent to this visit. and Establish Care (Last pap 1/20/22-W[facility] Dr Parag Barrera ) and Menstrual Problem (Irregular bleeding LMP 8/9/22- 8/14/22, started heavy bleeding again 3 days ago )   as well as counseling on preventative health maintenance follow-up.       Mary Lou Mcarthur, DO [facility]  8/30/2022, 9:32 AM

## 2022-08-30 NOTE — DISCHARGE INSTRUCTIONS
Go to your OB/GYN tomorrow as scheduled. PLEASE RETURN TO THE EMERGENCY DEPARTMENT IMMEDIATELY if your symptoms worsen in anyway or in 8-12 hours if not improved for re-evaluation. You should immediately return to the ER for symptoms such as increasing pain, bloody stool, fever, a feeling of passing out, light headed, dizziness, chest pain, shortness of breath, persistent nausea and/or vomiting, numbness or weakness to the arms or legs, coolness or color change of the arms or legs. Please understand that at this time there is no evidence for a more serious underlying process, but that early in the process of an illness or injury, an emergency department workup can be falsely reassuring. Irving Carbajal!!!    From [facility] ([facility]) and [address] [place] [address] Emergency Services    On behalf of the Emergency Department staff at [facility]), I would like to thank you for giving us the opportunity to address your health care needs and concerns. We hope that during your visit, our service was delivered in a professional and caring manner. Please keep [facility]) in mind as we walk with you down the path to your own personal wellness. Please expect an automated text message or email from us so we can ask a few questions about your health and progress. Based on your answers, a clinician may call you back to offer help and instructions. Please understand that early in the process of an illness or injury, an emergency department workup can be falsely reassuring. If you notice any worsening, changing or persistent symptoms please call your family doctor or return to the ER immediately. Tell us how we did during your visit at [email or web address] [email or web address]   and let us know about your experience

## 2022-08-30 NOTE — ED PROVIDER NOTES
[facility]  [facility]  Phone: [phone]        Pt Name: Danisha Mane  MRN: [id number]  Sophiegfrupert 1985  Date of evaluation: 8/30/22    [address]       Chief Complaint   Patient presents with    Vaginal Bleeding     Started two days ago. Pt reports she had a normal period two weeks ago; pt reports she is bleeding through a pad every hour. HISTORY OF PRESENT ILLNESS (Location/Symptom, Timing/Onset, Context/Setting, Quality, Duration, Modifying Factors, Severity)      Danisha Mane is a 40 y.o. female with no pertinent PMH who presents to the ED via private auto with vaginal bleeding. Patient states that she has a history of endometriosis, fibroid uterus, and PCOS. With all these she has always had regular periods, just gets bloated and they are heavier than what abnormal for her symptoms would be. She states her last period was approximately 2 weeks ago at her typical time and then all of a sudden she started experiencing vaginal bleeding that started 2 days ago which is abnormal for her. She has had worsening bleeding with clots the size of quarters and family advised her to get evaluated because there was a lot of blood. She does have appoint with a new OB/GYN tomorrow. She does note that she used to be on progesterone up until 2 years ago when she moved here and her OB took her off of it. She does think her periods have been a little more heavier since coming off of the progesterone, but has not had a regular periods. She has some occasional abdominal bloating, but denies any abdominal pain or pelvic pain. Denies any vaginal discharge. Denies concern for STDs. She has exacerbation of her symptoms when she ambulates and some mild improvement when lying flat. She feels like she is changing through a pad every 1-2 hours when she is ambulatory. She did go to work today and do a lot of lifting which exacerbated her symptoms.   Does notice some slight tablet by mouth 2 times daily 1/21/22   Keli Mott, APRN - NP   ibuprofen (ADVIL;MOTRIN) 800 MG tablet Take 1 tablet by mouth 2 times daily as needed for Pain 5/11/21   Peggy Dave MD       REVIEW OF SYSTEMS  (2-9 systems for level 4, 10 ormore for level 5)      Review of Systems    Constitutional: See HPI. Respiratory: See HPI. Cardiovascular: See HPI. GI: See HPI. : See HPI. Skin: Denies new rashes or wounds. Neurologic:  Denies new numbness or weakness. Psychiatric: Denies sleep disturbances. All other systems negative except as marked. PHYSICAL EXAM  (up to 7 for level 4, 8 or more for level 5)      INITIAL VITALS:  height is 5' 8\" (1.727 m) and weight is 113.4 kg (250 lb). Her temperature is 98.3 °F (36.8 °C). Her blood pressure is 155/100 (abnormal) and her pulse is 82. Her respiration is 18 and oxygen saturation is 99%. Vital signs reviewed. Physical Exam    General:  Alert, cooperative, well-groomed, well-nourished, appears stated age, and is in no acute distress. Head:  Normocephalic, atraumatic, and without obvious abnormality. Eyes:  Sclerae/conjunctivae clear without injection, pallor, or icterus. Corneas clear without opacities. EOM's intact. ENT: Ears and nose are all without obvious masses lesion or deformity. No oropharynx examination performed due to aerosolization risk during COVID-19 pandemic. Neck: Supple and symmetrical. Trachea midline. No adenopathy. No jugular venous distention. Lungs:   No respiratory distress. Clear to auscultation bilaterally. No wheezes, rhonchi, or rales. Heart:  Regular rate. Regular rhythm. No murmurs, rubs, or gallops. Abdomen:   Normoactive bowel sounds. The abdomen is soft and nontender in all quadrants without guarding or rebound. It is nondistended. No obvious gravid abdomen. No palpable masses. No CVA tenderness. Extremities: Warm and dry without erythema or edema.    Skin: Soft, good turgor, and well-hydrated. No obvious rashes or lesions. Neurologic: GCS is 15 and no focal deficits are appreciated. Normal gait. Grossly normal motor and sensation. Speech clear. Psychiatric: Normal mood and affect. Normal behavior. Coherent thought process. DIFFERENTIAL DIAGNOSIS / MDM     The patient presents to the Emergency Department with vaginal bleeding. Vital signs are unremarkable. Patient is afebrile, nontoxic-appearing, and in no acute distress. Physical exam is reassuring and demonstrates no peritoneal signs or tenderness to the abdomen. Will plan to obtain CBC, BMP and offered pelvic but deferred as she will obtain one tomorrow with her OB/Gyn. PLAN (LABS / IMAGING / EKG):  Orders Placed This Encounter   Procedures    CBC with Auto Differential    Basic Metabolic Panel w/ Reflex to MG    HCG Qualitative, Serum       MEDICATIONS ORDERED:  No orders of the defined types were placed in this encounter.       Controlled Substances Monitoring:     DIAGNOSTIC RESULTS     LABS:  Results for orders placed or performed during the hospital encounter of 08/29/22   CBC with Auto Differential   Result Value Ref Range    WBC 10.3 3.5 - 11.0 k/uL    RBC 4.06 4.0 - 5.2 m/uL    Hemoglobin 11.6 (L) 12.0 - 16.0 g/dL    Hematocrit 35.3 (L) 36 - 46 %    MCV 87.0 80 - 100 fL    MCH 28.6 26 - 34 pg    MCHC 32.9 31 - 37 g/dL    RDW 12.8 12.5 - 15.4 %    Platelets 218 206 - 123 k/uL    MPV 7.7 6.0 - 12.0 fL    Seg Neutrophils 65 36 - 66 %    Lymphocytes 25 24 - 44 %    Monocytes 6 2 - 11 %    Eosinophils % 3 1 - 4 %    Basophils 1 0 - 2 %    Segs Absolute 6.80 1.8 - 7.7 k/uL    Absolute Lymph # 2.50 1.0 - 4.8 k/uL    Absolute Mono # 0.60 0.1 - 1.2 k/uL    Absolute Eos # 0.30 0.0 - 0.4 k/uL    Basophils Absolute 0.10 0.0 - 0.2 k/uL   Basic Metabolic Panel w/ Reflex to MG   Result Value Ref Range    Glucose 98 70 - 99 mg/dL    BUN 20 6 - 20 mg/dL    Creatinine 1.00 (H) 0.50 - 0.90 mg/dL    Calcium 9.2 8.6 - 10.4 mg/dL    Sodium collaborating physician was available for consultation and they were apprised of the assessment and plan. CONSULTS:  None    PROCEDURES:  None    FINAL IMPRESSION      1. Abnormal vaginal bleeding          DISPOSITION / PLAN     CONDITION ON DISPOSITION:   Good / Stable for discharge.      PATIENT REFERRED TO:  Leonel Lemus DO  [address] [place] [phone]    Go in 1 day      DISCHARGE MEDICATIONS:  Discharge Medication List as of 8/29/2022  9:31 PM          Melia Webster PA-C   Emergency Medicine Physician Assistant    (Please note that portions of this note were completed with a voice recognition program.  Efforts were made to edit the dictations but occasionally words aremis-transcribed.)       Melia Webster PA-C  08/30/22 2714

## 2022-09-14 ENCOUNTER — HOSPITAL ENCOUNTER (EMERGENCY)
Age: 37
Discharge: HOME | End: 2022-09-14

## 2022-09-14 VITALS
OXYGEN SATURATION: 100 % | TEMPERATURE: 98.2 F | BODY MASS INDEX: 37.89 KG/M2 | HEART RATE: 105 BPM | HEIGHT: 68 IN | WEIGHT: 250 LBS | SYSTOLIC BLOOD PRESSURE: 145 MMHG | DIASTOLIC BLOOD PRESSURE: 82 MMHG | RESPIRATION RATE: 16 BRPM

## 2022-09-14 ASSESSMENT — PAIN - FUNCTIONAL ASSESSMENT: PAIN_FUNCTIONAL_ASSESSMENT: 0-10

## 2022-09-14 ASSESSMENT — PAIN DESCRIPTION - DESCRIPTORS: DESCRIPTORS: CRAMPING;STABBING

## 2022-09-14 ASSESSMENT — PAIN DESCRIPTION - PAIN TYPE: TYPE: ACUTE PAIN

## 2022-09-14 ASSESSMENT — PAIN DESCRIPTION - LOCATION: LOCATION: ABDOMEN

## 2022-09-14 ASSESSMENT — PAIN DESCRIPTION - FREQUENCY: FREQUENCY: CONTINUOUS

## 2022-09-14 ASSESSMENT — PAIN DESCRIPTION - ORIENTATION: ORIENTATION: LOWER

## 2022-09-14 ASSESSMENT — PAIN DESCRIPTION - DIRECTION: RADIATING_TOWARDS: LOWER BACK

## 2022-09-15 DIAGNOSIS — N93.9 ABNORMAL UTERINE BLEEDING: Primary | ICD-10-CM

## 2022-09-19 ENCOUNTER — HOSPITAL ENCOUNTER (OUTPATIENT)
Dept: ULTRASOUND IMAGING | Age: 37
Discharge: HOME | End: 2022-09-21
Payer: MEDICAID

## 2022-09-19 DIAGNOSIS — N93.9 ABNORMAL UTERINE BLEEDING: ICD-10-CM

## 2022-09-19 PROCEDURE — 76856 US EXAM PELVIC COMPLETE: CPT

## 2022-09-22 ENCOUNTER — INITIAL CONSULT (OUTPATIENT)
Dept: OBGYN CLINIC | Age: 37
End: 2022-09-22
Payer: MEDICAID

## 2022-09-22 VITALS
DIASTOLIC BLOOD PRESSURE: 90 MMHG | HEIGHT: 68 IN | HEART RATE: 92 BPM | WEIGHT: 253.4 LBS | SYSTOLIC BLOOD PRESSURE: 132 MMHG | BODY MASS INDEX: 38.4 KG/M2

## 2022-09-22 DIAGNOSIS — R10.2 PELVIC PAIN: ICD-10-CM

## 2022-09-22 DIAGNOSIS — R79.89 ELEVATED SERUM CREATININE: ICD-10-CM

## 2022-09-22 DIAGNOSIS — N94.6 DYSMENORRHEA: ICD-10-CM

## 2022-09-22 DIAGNOSIS — R03.0 ELEVATED BLOOD PRESSURE READING: ICD-10-CM

## 2022-09-22 DIAGNOSIS — N93.9 ABNORMAL UTERINE BLEEDING: Primary | ICD-10-CM

## 2022-09-22 PROCEDURE — 99213 OFFICE O/P EST LOW 20 MIN: CPT | Performed by: STUDENT IN AN ORGANIZED HEALTH CARE EDUCATION/TRAINING PROGRAM

## 2022-09-22 NOTE — PROGRESS NOTES
[address] [facility] Obstetrics and Gynecology  [address] [facility], [address] [place] [address]      Pre-operative Orders    Allyson Benites  1985  Primary Care Physician: Rosemarie Sawyer MD    HISTORY & PHYSICAL      2022       [facility]OSP[facility]: [facility]    HPI: Allyson Benites is a 40 y.o. female [id number] here for pre op appointment. She has a long standing history of pelvic pain, dysmenorrhea and abnormal uterine bleeding. She originally wanted to wait to schedule her hysterectomy until the new year but reports she can't go through another period. Her mom plans on flying in from [facility] to help during her recovery. She is adamant she does not want to try additional medical management. She desires surgical treatment with a hysterectomy. She again understands that removal of her uterus does not always relieve pelvic pain. Through chart review it was noted that her Cr has been elevated. Most recently it was 1 on 22. She denies any hx of kidney disease or excess NSAID use. Her BP was elevated today too. She states she does not have a previous diagnosis of hypertension. She reports in [facility] she was told she had Afib and underwent a \"dye test where all her valves were clear\". She does not take any medication for this. Her only surgery was 1  and she had a spinal. She has never been under anesthesia. Discussed with patient R/B/A to hysterectomy. Patient is a candidate for Total Laparoscopic Hysterectomy. Discussed with patient risks of procedure and complications including but not limited to: injury to surrounding structures (bowel, bladder, arteries, nerves, veins), infection, bleeding need for further surgery, post-op complications including DVT, PE, atelectasis, anesthesia complications, and death. Need for blood products reviewed and patient is agreeable to receiving blood if needed. Discussed need for 6 weeks off of work and light activity.  Discussed nothing in the vagina and on file   Tobacco Use    Smoking status: Every Day     Types: Cigarettes    Smokeless tobacco: Never   Vaping Use    Vaping Use: Never used   Substance and Sexual Activity    Alcohol use: Not Currently    Drug use: Never    Sexual activity: Not Currently     Partners: Male     Comment: tubal   Other Topics Concern    Not on file   Social History Narrative    Not on file     Social Determinants of Health     Financial Resource Strain: Not on file   Food Insecurity: Not on file   Transportation Needs: Not on file   Physical Activity: Not on file   Stress: Not on file   Social Connections: Not on file   Intimate Partner Violence: Not on file   Housing Stability: Not on file         MEDICATIONS:  Current Outpatient Medications   Medication Sig Dispense Refill    norethindrone (AYGESTIN) 5 MG tablet Take 1 tablet by mouth daily 30 tablet 3    venlafaxine (EFFEXOR XR) 75 MG extended release capsule TAKE ONE CAPSULE BY MOUTH WITH FOOD ONCE DAILY 30 capsule 5    levothyroxine (SYNTHROID) 75 MCG tablet Take 1 tablet by mouth Daily 90 tablet 1    metFORMIN (GLUCOPHAGE) 500 MG tablet Take 1 tablet by mouth daily (with breakfast) 90 tablet 5    ferrous sulfate (IRON 325) 325 (65 Fe) MG tablet Take 1 tablet by mouth 2 times daily 60 tablet 3    ibuprofen (ADVIL;MOTRIN) 800 MG tablet Take 1 tablet by mouth 2 times daily as needed for Pain 60 tablet 0     No current facility-administered medications for this visit.        ALLERGIES:  Allergies as of 09/22/2022 - Fully Reviewed 09/22/2022   Allergen Reaction Noted    Shellfish-derived products Hives 04/23/2021    Iodine  06/17/2021       REVIEW OF SYSTEMS:  Constitutional: negative fever, negative chills  HEENT: negative visual disturbances, negative headaches  Respiratory: negative dyspnea, negative cough  Cardiovascular: negative chest pain,  negative palpitations  Gastrointestinal: negative abdominal pain,  negative N/V, negative diarrhea, negative constipation  Genitourinary: negative dysuria, negative vaginal discharge, pos heavy periods, pos pelvic pain   Dermatological: negative rash  Hematologic: negative bruising  Immunologic/Lymphatic: negative recent illness, negative recent sick contact  Musculoskeletal: negative back pain  Neurological:  negative dizziness, negative weakness  Behavior/Psych: negative depression, negative anxiety      Vitals:    09/22/22 1002 09/22/22 1048   BP: (!) 132/94 (!) 132/90   Site: Left Upper Arm Left Upper Arm   Position: Sitting Sitting   Cuff Size: Large Adult Large Adult   Pulse: 92    Weight: 253 lb 6.4 oz (114.9 kg)    Height: 5' 8\" (1.727 m)        Physical Exam:  General appearance:  no apparent distress, alert and cooperative  Neurologic:  alert, oriented, normal speech, no focal findings or movement disorder noted  Lungs:  No increased work of breathing, good air exchange, clear to auscultation bilaterally, no crackles or wheezing  Heart:  regular rate and rhythm   Abdomen:  soft, non-tender, stretch marks present, no guarding, non acute abdomen   Extremities:  no calf tenderness, non edematous BL LE   Pelvic: declined       Diagnostics:  US PELVIS COMPLETE  Result Date: 9/20/2022  \  EXAMINATION: PELVIC ULTRASOUND 9/19/2022 TECHNIQUE: Transabdominal pelvic ultrasound was performed. COMPARISON: None HISTORY: ORDERING SYSTEM PROVIDED HISTORY: Abnormal uterine bleeding TECHNOLOGIST PROVIDED HISTORY: This procedure can be scheduled via [employer]. Access your [employer] account by visiting [email or web address]. FINDINGS: Measurements: Uterus: 8.9 x 5.2 x 6.7 cm Endometrial stripe: 5.5 mm Right Ovary:2.5 x 1.6 x 1.6 cm Left Ovary: 2.9 x 1.8 x 2.2 cm Ultrasound Findings: Uterus: Uterus has a somewhat heterogeneous myometrial echotexture with no focal masses identified by ultrasound. Endometrial stripe: Endometrial stripe is within normal limits. Right Ovary: Right ovary is within normal limits. Left Ovary:  Left ovary is within normal limits.  Free Fluid: Trace free fluid in the cul-de-sac, likely physiologic     Essentially unremarkable pelvic ultrasound         Labs:  Results for orders placed or performed during the hospital encounter of 08/29/22   CBC with Auto Differential   Result Value Ref Range    WBC 10.3 3.5 - 11.0 k/uL    RBC 4.06 4.0 - 5.2 m/uL    Hemoglobin 11.6 (L) 12.0 - 16.0 g/dL    Hematocrit 35.3 (L) 36 - 46 %    MCV 87.0 80 - 100 fL    MCH 28.6 26 - 34 pg    MCHC 32.9 31 - 37 g/dL    RDW 12.8 12.5 - 15.4 %    Platelets 182 980 - 103 k/uL    MPV 7.7 6.0 - 12.0 fL    Seg Neutrophils 65 36 - 66 %    Lymphocytes 25 24 - 44 %    Monocytes 6 2 - 11 %    Eosinophils % 3 1 - 4 %    Basophils 1 0 - 2 %    Segs Absolute 6.80 1.8 - 7.7 k/uL    Absolute Lymph # 2.50 1.0 - 4.8 k/uL    Absolute Mono # 0.60 0.1 - 1.2 k/uL    Absolute Eos # 0.30 0.0 - 0.4 k/uL    Basophils Absolute 0.10 0.0 - 0.2 k/uL   Basic Metabolic Panel w/ Reflex to MG   Result Value Ref Range    Glucose 98 70 - 99 mg/dL    BUN 20 6 - 20 mg/dL    Creatinine 1.00 (H) 0.50 - 0.90 mg/dL    Calcium 9.2 8.6 - 10.4 mg/dL    Sodium 141 135 - 144 mmol/L    Potassium 4.1 3.7 - 5.3 mmol/L    Chloride 105 98 - 107 mmol/L    CO2 28 20 - 31 mmol/L    Anion Gap 8 (L) 9 - 17 mmol/L    GFR Non-African American >60 >60 mL/min    GFR African American >60 >60 mL/min    GFR Comment         HCG Qualitative, Serum   Result Value Ref Range    hCG Qual NEGATIVE NEGATIVE           ASSESSMENT:     Diagnosis Orders   1. Abnormal uterine bleeding        2. Pelvic pain        3. Dysmenorrhea        4. Elevated blood pressure reading        5.  Elevated serum creatinine            Patient Active Problem List    Diagnosis Date Noted    Abnormal uterine bleeding 09/22/2022     Priority: Medium    Elevated blood pressure reading 09/22/2022     Priority: Medium    Elevated serum creatinine 09/22/2022     Priority: Medium    Pelvic pain 12/09/2021    PCO (polycystic ovaries) 12/09/2021     RX Metformin 750 mg bid Endometrial thickening on ultrasound 12/09/2021     Plan: Emb hscope in office 1/2022      Insulin resistance 12/09/2021    Menorrhagia with regular cycle 12/09/2021    Dysmenorrhea 12/09/2021    H/O tubal ligation 12/09/2021    Abnormal facial hair 07/16/2021    Family history of blood clots 07/16/2021          Plan:  - Patient needs clearance from PCP due to elevated blood pressures today in the office, elevated Cr and questionable cardiac hx of Afib   - EMB 1/20/22 showed proliferative endometrium with no atypia               - Pap 1/20/22 neg for malignancy, HPV neg   - Most recent TVUS from 9/20/22 was unremarkable. Uterus measuring 8.9 x 5.2 x 6.7 cm with heterogenous myometrial echo texture   - Discussed routes of hysterectomy and decided on total laparoscopic hyst with risk reducing salpingectomy and cystoscopy  - Consent forms signed   - Will coordinate with OR and call patient with a date and time. Will plan for mid/late October to give patient time for PCP/possibly cardiac clearance          [facility]    Orders:    CBC:Yes                                                             NPO after midnight   Type & Screen:Yes  PAT Appointment:Yes  Anesthesia Protocol: Yes  Urinalysis: No                                                    PT/PTT: No    UrineHCG: Yes                                                  Liver Function Studies: No     CMP: Yes                                                                BMP: No     EKG: No                                                                  Chest X-ray: No     COVID testing: N/A                                Bowel Prep Instructions: No  Other Special Instructions: N/A  Antibiotics within one hour prior to surgery: 2g Ancef  Heparin 5000 units subcutaneously one hour before surgery: No  Consults/Notification: No   Medical Clearance From: Yes, PCP  H&P: not done  Consent: done    Counseling:   The patient was counseled on all options both medical and surgical, conservative as well as definitive. She has elected to proceed with the procedure as stated above. The patient was counseled on the procedure. Risks and complications were reviewed in detail. The patients orders, labs, consents have been completed. The history and physical as well as all supporting surgical documentation will be forwarded to the pre-operative holding area.       DO Sherri Puentes Ob/Gyn   9/22/2022, 4:24 PM

## 2022-10-03 ENCOUNTER — TELEPHONE (OUTPATIENT)
Dept: OBGYN CLINIC | Age: 37
End: 2022-10-03

## 2022-10-03 NOTE — TELEPHONE ENCOUNTER
----- Message from Junior Vee DO sent at 9/22/2022  4:46 PM EDT -----  Surgery Location: [facility]  Surgery Type: TLH, BS, Cysto   Time needed for case: 3  Date: 10/25/22  Start Time: 1230 (to follow Dr Julia Taylor)  Arrival Time: [address] test needed: No  PAT needed: Yes  Needs PCP clearance

## 2022-10-04 ENCOUNTER — TELEPHONE (OUTPATIENT)
Dept: OBGYN CLINIC | Age: 37
End: 2022-10-04

## 2022-10-04 NOTE — TELEPHONE ENCOUNTER
Called [facility] surgery scheduling spoke with [facility]   Surgery start time Tuesday 10/25/22 12:50pm to follow Dr Teresa COATS Thursday 10/13/22 @ 9:00am

## 2022-10-04 NOTE — TELEPHONE ENCOUNTER
Called pt left msg with surgery/PAT information  Also reminded about needing surgery clearance with PCP

## 2022-10-04 NOTE — TELEPHONE ENCOUNTER
----- Message from Demetrice Guerar DO sent at 9/22/2022  4:46 PM EDT -----  Surgery Location: [facility]  Surgery Type: TLH, BS, Cysto   Time needed for case: 3  Date: 10/25/22  Start Time: 1230 (to follow Dr Consuelo Cerda)  Arrival Time: [address] test needed: No  PAT needed: Yes  Needs PCP clearance

## 2022-10-11 RX ORDER — SODIUM CHLORIDE, SODIUM LACTATE, POTASSIUM CHLORIDE, CALCIUM CHLORIDE 600; 310; 30; 20 MG/100ML; MG/100ML; MG/100ML; MG/100ML
1000 INJECTION, SOLUTION INTRAVENOUS CONTINUOUS
Status: CANCELLED | OUTPATIENT
Start: 2022-10-11

## 2022-10-11 NOTE — DISCHARGE INSTRUCTIONS
Preoperative Instructions:    Stop eating solid foods at midnight the night prior to surgery. Stop drinking clear liquids at midnight the night prior to surgery. (Follow bowel prep instructions if instructed by your surgeon.)    Yanick Neigh at the surgery center (Entrance B) by 10:50 on 10/25/2022  (or as directed by your surgeon's office). If you have been given a blood band, you must bring it with you the day of surgery. Please stop any blood thinning medications as directed by your surgeon or prescribing physician. Failure to stop certain medications may interfere with your scheduled surgery. These may include:  Aspirin, Warfarin (Coumadin), Clopidogrel (Plavix), Ibuprofen (Motrin, Advil), Naproxen (Aleve), Meloxicam (Mobic), Celecoxib (Celebrex), Eliquis, Pradaxa, Xarelto, Effient, Fish Oil, Herbal supplements. You may continue the rest of your medications through the night before surgery unless instructed otherwise. Please take only the following medication(s) the day of surgery with a small sip of water:  Synthroid/levothyroxine    Please use and bring inhalers the day of surgery. Please bring CPAP the day of surgery. ____________________________  ____________________________  Signature (Patient)           Signature/date(Provider)      REMINDERS:  ** If you are going home the day of your procedure, you will need a friend or family member to drive you home after your procedure. Your  must be 25years of age or older and able to sign off on your discharge instructions. Taxi cabs or any form of public transportation is not acceptable. ** It is preferable that the friend or family member stay at the hospital throughout your procedure. ** If you are going home the same day as your procedure, someone must remain with you for the first 24 hours after your surgery if you receive anesthesia or sedation.   If you do not have someone to stay with you, your procedure may be cancelled. **  Please do not wear any jewelry or body piercings the day of surgery. PREPARING FOR YOUR SURGERY:     Before surgery, you can play an important role in your own health. Because skin is not sterile, we need to be sure that your skin is as free of germs as possible before surgery by carefully washing before surgery. Preparing or prepping skin before surgery can reduce the risk of a surgical site infection.   Do not shave the area of your body where your surgery will be performed unless you received specific permission from your physician. You will need to shower at home the night before surgery and the morning of surgery with a special soap called chlorhexidine gluconate (CHG*). *Not to be used by people allergic to Chlorhexidine Gluconate (CHG). Following these instructions will help you be sure that your skin is clean before surgery. Instructions on cleaning your skin before surgery: The night before your surgery: You will need to shower with warm water (not hot) and the CHG soap. Use a clean wash cloth and a clean towel. Have clean clothes available to put on after the shower. First wash your hair with regular shampoo. Rinse your hair and body thoroughly to remove the shampoo. Wash your face with your regular soap or water only. Thoroughly rinse your body with warm water from the neck down. Turn water off to prevent rinsing the soap off too soon. With a clean wet washcloth and half of the CHG soap in the bottle, lather your entire body from the neck down. Do not use CHG soap near your eyes or ears to avoid injury to those areas. Wash thoroughly, paying special attention to the area where your surgery will be performed. Wash your body gently for five (5) minutes. Avoid scrubbing your skin too hard. Turn the water back on and rinse your body thoroughly. Pat yourself dry with a clean, soft towel. Do not apply lotion, cream or powder.     Dress with clean freshly washed clothes. The morning of surgery:    Repeat shower following steps above  - using remaining half of CHG soap in bottle. If you have any questions, call the Pre-Admission Testing Unit at [phone]. Day of Surgery/Procedure    As a patient at [facility] you can expect quality medical and nursing care that is centered on your individual needs. Our goal is to make your surgical experience as comfortable as possible  . Directions to the [address] [place] [address] is located at [address] [facility], [address]. Please pull into the Emergency [address] [facility] parking lot (Entrance B) and park in that lot. We also have additional parking across the street. You will enter the facility following the [address] sign. Please stop at the reception desk where you will be checked in by the staff. If you have any questions please call [phone]. Transportation after your procedure. You will need a friend or family member to drive you home after your procedure. Your  must be 25years of age or older and able to sign off on your discharge instructions. Taxi cabs or any form of public transportation is not acceptable. It is preferable that the friend or family member stay at the hospital throughout your procedure. Someone must remain at home with you for the first 24 hours after your surgery if you receive anesthesia or sedation. If you do not have someone to stay with you, your procedure may be cancelled. Patient Instructions    If you are having any type of anesthesia you are to have nothing to eat or drink after midnight the night before your surgery. This includes gum, mints, water or smoking or chewing tobacco.  The only exception to this is a small sip of water to take with any morning dose of heart, blood pressure, or seizure medications.     Bring a list of all medications you take, along with the dose of the medications and how often you take it. If more convenient bring the pharmacy bottles in a zip lock bag. Please shower the night before and the morning of surgery with an antibacterial soap. Please use the wipes given to you the night before your surgery after your shower. Unless otherwise told by your physician, please do not shave legs or any part of your body below your neck the night before or day of your surgery. You may shave your face or neck. Brush your teeth but do not swallow water. Bring your inhaler if you are currently using one. Bring your eyeglasses and case with you. No contacts are to be worn the day of surgery. You also may bring your hearing aids. Bring your blood band if one has been given to you. Please do not close the clasp. If you are on C-PAP or Bi-PAP at home and plan on staying in the hospital overnight for your surgery please bring the machine with you. Do not wear any jewelry or body piercings day of surgery. Also, NO lotion, perfume or deodorant to be used the day of surgery. Do not bring any valuables, such as jewelry, cash or credit cards. If you are staying overnight with us, please bring a SMALL bag of personal items. We cannot accommodate large items, like suitcases. Please wear loose, comfortable clothing. If you are potentially going to have a cast or brace bring clothing that will fit over them.                                                                                                                           In case of illness - If you have cold or flu like symptoms (high fever, runny nose, sore throat, cough, etc.) rash, nausea, vomiting, loose stools, and/or recent contact with someone who has a contagious disease (chicken pox, measles, etc.) Please call your doctor before coming to the hospital.      If your child is having surgery please make arrangements for any other children to be cared for at home on the day of surgery. Other children are not permitted in recovery room and we want you to be able to spend time with the patient. If other arrangements are not available then we suggest that you have a second adult to stay in the waiting room.       If you have any other questions regarding your procedure or the day of surgery, please call [phone], or [phone]

## 2022-10-13 ENCOUNTER — HOSPITAL ENCOUNTER (OUTPATIENT)
Dept: PREADMISSION TESTING | Age: 37
Discharge: HOME | End: 2022-10-17
Payer: MEDICAID

## 2022-10-13 VITALS
RESPIRATION RATE: 20 BRPM | SYSTOLIC BLOOD PRESSURE: 169 MMHG | BODY MASS INDEX: 38.49 KG/M2 | DIASTOLIC BLOOD PRESSURE: 105 MMHG | HEART RATE: 88 BPM | TEMPERATURE: 97.7 F | WEIGHT: 254 LBS | HEIGHT: 68 IN | OXYGEN SATURATION: 97 %

## 2022-10-13 LAB
ABO/RH: NORMAL
ALBUMIN SERPL-MCNC: 4.5 G/DL (ref 3.5–5.2)
ALBUMIN/GLOBULIN RATIO: 1.7 (ref 1–2.5)
ALP BLD-CCNC: 58 U/L (ref 35–104)
ALT SERPL-CCNC: 12 U/L (ref 5–33)
ANION GAP SERPL CALCULATED.3IONS-SCNC: 9 MMOL/L (ref 9–17)
ANTIBODY SCREEN: NEGATIVE
ARM BAND NUMBER: NORMAL
AST SERPL-CCNC: 12 U/L
BILIRUB SERPL-MCNC: 0.5 MG/DL (ref 0.3–1.2)
BUN BLDV-MCNC: 10 MG/DL (ref 6–20)
CALCIUM SERPL-MCNC: 9.3 MG/DL (ref 8.6–10.4)
CHLORIDE BLD-SCNC: 102 MMOL/L (ref 98–107)
CO2: 27 MMOL/L (ref 20–31)
CREAT SERPL-MCNC: 0.74 MG/DL (ref 0.5–0.9)
EXPIRATION DATE: NORMAL
GFR SERPL CREATININE-BSD FRML MDRD: >60 ML/MIN/1.73M2
GLUCOSE BLD-MCNC: 90 MG/DL (ref 70–99)
HCG(URINE) PREGNANCY TEST: NEGATIVE
HCT VFR BLD CALC: 36.9 % (ref 36.3–47.1)
HEMOGLOBIN: 12.5 G/DL (ref 11.9–15.1)
MCH RBC QN AUTO: 29.8 PG (ref 25.2–33.5)
MCHC RBC AUTO-ENTMCNC: 33.9 G/DL (ref 28.4–34.8)
MCV RBC AUTO: 87.9 FL (ref 82.6–102.9)
NRBC AUTOMATED: 0 PER 100 WBC
PDW BLD-RTO: 12.1 % (ref 11.8–14.4)
PLATELET # BLD: 298 K/UL (ref 138–453)
PMV BLD AUTO: 9.5 FL (ref 8.1–13.5)
POTASSIUM SERPL-SCNC: 4.2 MMOL/L (ref 3.7–5.3)
RBC # BLD: 4.2 M/UL (ref 3.95–5.11)
SODIUM BLD-SCNC: 138 MMOL/L (ref 135–144)
TOTAL PROTEIN: 7.2 G/DL (ref 6.4–8.3)
WBC # BLD: 9.9 K/UL (ref 3.5–11.3)

## 2022-10-13 PROCEDURE — 80053 COMPREHEN METABOLIC PANEL: CPT

## 2022-10-13 PROCEDURE — 93005 ELECTROCARDIOGRAM TRACING: CPT | Performed by: ANESTHESIOLOGY

## 2022-10-13 PROCEDURE — 81025 URINE PREGNANCY TEST: CPT

## 2022-10-13 PROCEDURE — 85027 COMPLETE CBC AUTOMATED: CPT

## 2022-10-13 PROCEDURE — 86901 BLOOD TYPING SEROLOGIC RH(D): CPT

## 2022-10-13 PROCEDURE — 86850 RBC ANTIBODY SCREEN: CPT

## 2022-10-13 PROCEDURE — 86900 BLOOD TYPING SEROLOGIC ABO: CPT

## 2022-10-13 PROCEDURE — 36415 COLL VENOUS BLD VENIPUNCTURE: CPT

## 2022-10-13 ASSESSMENT — PAIN DESCRIPTION - ORIENTATION: ORIENTATION: ANTERIOR

## 2022-10-13 ASSESSMENT — PAIN DESCRIPTION - PAIN TYPE: TYPE: CHRONIC PAIN

## 2022-10-13 ASSESSMENT — PAIN DESCRIPTION - FREQUENCY: FREQUENCY: CONTINUOUS

## 2022-10-13 ASSESSMENT — PAIN DESCRIPTION - LOCATION: LOCATION: ABDOMEN

## 2022-10-13 ASSESSMENT — PAIN SCALES - GENERAL: PAINLEVEL_OUTOF10: 5

## 2022-10-13 ASSESSMENT — PAIN DESCRIPTION - DESCRIPTORS: DESCRIPTORS: CRAMPING

## 2022-10-13 NOTE — PROGRESS NOTES
Anesthesia Focused Assessment    Has patient ever tested positive for COVID? Yes    STOP-BANG Sleep Apnea Questionnaire    SNORE loudly (heard through closed doors)? Yes  TIRED, fatigued, sleepy during daytime? No  OBSERVED stopping breathing during sleep? No  High blood PRESSURE being treated? No    BMI over 35? Yes  AGE over 48? No  NECK circumference over 16\"? No  GENDER (male)? No             Total 2  High risk 5-8  Intermediate risk 3-4  Low risk 0-2    Obstructive Sleep Apnea: snores but denies apnea  If YES, machine used: no     Type 1 DM:   no  T2DM:  no    Coronary Artery Disease:  no, but says that she has a history of atrial fibrillation while in [place] years ago. Hypertension:  not yet treated    Active smoker:  social smoker  Drinks Alcohol:  none currently    Dentition: benign    Defib / AICD / Pacemaker: no      Renal Failure/dialysis:  no    Patient was evaluated in PAT & anesthesia guidelines were applied. NPO guidelines, medication instructions and scheduled arrival time were reviewed with patient. I advised patient to please contact the surgeon's office, ahead of time if possible, if any new signs or symptoms of illness, infection, rash, etc    Hx of anesthesia complications:  no  Family hx of anesthesia complications:  no                                                                                                                     Anesthesia contacted:   no  Medical or cardiac clearance ordered: PCP clearance pending, although patient does not have an upcoming appointment. I reminded her to call and make an appointment. She used to follow with Dr. Sharda Paiz but has recently changed to Dr. Anjelica Goodwin who she says \"took over the practice. \"  Alvino Ramirez PA-C  10/13/22  10:28 AM

## 2022-10-14 ENCOUNTER — OFFICE VISIT (OUTPATIENT)
Dept: FAMILY MEDICINE CLINIC | Age: 37
End: 2022-10-14
Payer: MEDICAID

## 2022-10-14 VITALS
SYSTOLIC BLOOD PRESSURE: 136 MMHG | HEART RATE: 98 BPM | BODY MASS INDEX: 38.8 KG/M2 | DIASTOLIC BLOOD PRESSURE: 78 MMHG | HEIGHT: 68 IN | WEIGHT: 256 LBS

## 2022-10-14 DIAGNOSIS — E03.9 ACQUIRED HYPOTHYROIDISM: ICD-10-CM

## 2022-10-14 DIAGNOSIS — E88.81 INSULIN RESISTANCE: ICD-10-CM

## 2022-10-14 DIAGNOSIS — N94.6 DYSMENORRHEA: ICD-10-CM

## 2022-10-14 DIAGNOSIS — N92.0 MENORRHAGIA WITH REGULAR CYCLE: ICD-10-CM

## 2022-10-14 DIAGNOSIS — R10.2 PELVIC PAIN: ICD-10-CM

## 2022-10-14 DIAGNOSIS — N93.9 ABNORMAL UTERINE BLEEDING: ICD-10-CM

## 2022-10-14 DIAGNOSIS — Z76.89 ESTABLISHING CARE WITH NEW DOCTOR, ENCOUNTER FOR: ICD-10-CM

## 2022-10-14 DIAGNOSIS — F32.A DEPRESSION, UNSPECIFIED DEPRESSION TYPE: Primary | ICD-10-CM

## 2022-10-14 DIAGNOSIS — I15.9 SECONDARY HYPERTENSION: ICD-10-CM

## 2022-10-14 LAB
EKG ATRIAL RATE: 78 BPM
EKG P AXIS: 25 DEGREES
EKG P-R INTERVAL: 174 MS
EKG Q-T INTERVAL: 388 MS
EKG QRS DURATION: 98 MS
EKG QTC CALCULATION (BAZETT): 442 MS
EKG R AXIS: 71 DEGREES
EKG T AXIS: 46 DEGREES
EKG VENTRICULAR RATE: 78 BPM

## 2022-10-14 PROCEDURE — 99214 OFFICE O/P EST MOD 30 MIN: CPT

## 2022-10-14 RX ORDER — BLOOD PRESSURE TEST KIT
1 KIT MISCELLANEOUS DAILY PRN
Qty: 1 KIT | Refills: 0 | Status: SHIPPED | OUTPATIENT
Start: 2022-10-14

## 2022-10-14 RX ORDER — FERROUS SULFATE 325(65) MG
325 TABLET ORAL 2 TIMES DAILY
Qty: 60 TABLET | Refills: 3 | Status: SHIPPED | OUTPATIENT
Start: 2022-10-14

## 2022-10-14 RX ORDER — VENLAFAXINE HYDROCHLORIDE 75 MG/1
CAPSULE, EXTENDED RELEASE ORAL
Qty: 30 CAPSULE | Refills: 5 | Status: SHIPPED | OUTPATIENT
Start: 2022-10-14

## 2022-10-14 RX ORDER — LEVOTHYROXINE SODIUM 0.07 MG/1
75 TABLET ORAL DAILY
Qty: 90 TABLET | Refills: 1 | Status: SHIPPED | OUTPATIENT
Start: 2022-10-14 | End: 2023-10-14

## 2022-10-14 RX ORDER — IBUPROFEN 800 MG/1
800 TABLET ORAL 2 TIMES DAILY PRN
Qty: 60 TABLET | Refills: 0 | Status: SHIPPED | OUTPATIENT
Start: 2022-10-14 | End: 2022-10-31 | Stop reason: ALTCHOICE

## 2022-10-14 SDOH — ECONOMIC STABILITY: FOOD INSECURITY: WITHIN THE PAST 12 MONTHS, YOU WORRIED THAT YOUR FOOD WOULD RUN OUT BEFORE YOU GOT MONEY TO BUY MORE.: SOMETIMES TRUE

## 2022-10-14 SDOH — ECONOMIC STABILITY: TRANSPORTATION INSECURITY
IN THE PAST 12 MONTHS, HAS THE LACK OF TRANSPORTATION KEPT YOU FROM MEDICAL APPOINTMENTS OR FROM GETTING MEDICATIONS?: NO

## 2022-10-14 SDOH — ECONOMIC STABILITY: FOOD INSECURITY: WITHIN THE PAST 12 MONTHS, THE FOOD YOU BOUGHT JUST DIDN'T LAST AND YOU DIDN'T HAVE MONEY TO GET MORE.: SOMETIMES TRUE

## 2022-10-14 SDOH — ECONOMIC STABILITY: TRANSPORTATION INSECURITY
IN THE PAST 12 MONTHS, HAS LACK OF TRANSPORTATION KEPT YOU FROM MEETINGS, WORK, OR FROM GETTING THINGS NEEDED FOR DAILY LIVING?: NO

## 2022-10-14 ASSESSMENT — PATIENT HEALTH QUESTIONNAIRE - PHQ9
1. LITTLE INTEREST OR PLEASURE IN DOING THINGS: 0
SUM OF ALL RESPONSES TO PHQ QUESTIONS 1-9: 1
8. MOVING OR SPEAKING SO SLOWLY THAT OTHER PEOPLE COULD HAVE NOTICED. OR THE OPPOSITE, BEING SO FIGETY OR RESTLESS THAT YOU HAVE BEEN MOVING AROUND A LOT MORE THAN USUAL: 0
6. FEELING BAD ABOUT YOURSELF - OR THAT YOU ARE A FAILURE OR HAVE LET YOURSELF OR YOUR FAMILY DOWN: 0
10. IF YOU CHECKED OFF ANY PROBLEMS, HOW DIFFICULT HAVE THESE PROBLEMS MADE IT FOR YOU TO DO YOUR WORK, TAKE CARE OF THINGS AT HOME, OR GET ALONG WITH OTHER PEOPLE: 0
SUM OF ALL RESPONSES TO PHQ QUESTIONS 1-9: 1
3. TROUBLE FALLING OR STAYING ASLEEP: 1
5. POOR APPETITE OR OVEREATING: 0
SUM OF ALL RESPONSES TO PHQ QUESTIONS 1-9: 1
SUM OF ALL RESPONSES TO PHQ9 QUESTIONS 1 & 2: 0
9. THOUGHTS THAT YOU WOULD BE BETTER OFF DEAD, OR OF HURTING YOURSELF: 0
SUM OF ALL RESPONSES TO PHQ QUESTIONS 1-9: 1
2. FEELING DOWN, DEPRESSED OR HOPELESS: 0
4. FEELING TIRED OR HAVING LITTLE ENERGY: 0
7. TROUBLE CONCENTRATING ON THINGS, SUCH AS READING THE NEWSPAPER OR WATCHING TELEVISION: 0

## 2022-10-14 ASSESSMENT — ENCOUNTER SYMPTOMS
SINUS PAIN: 0
SORE THROAT: 0
SINUS PRESSURE: 0
SHORTNESS OF BREATH: 0
ABDOMINAL PAIN: 0
CHEST TIGHTNESS: 0
TROUBLE SWALLOWING: 0
DIARRHEA: 0
EYE DISCHARGE: 0
WHEEZING: 0
COUGH: 0
VOMITING: 0
EYE ITCHING: 0
NAUSEA: 0
EYE REDNESS: 0

## 2022-10-14 ASSESSMENT — SOCIAL DETERMINANTS OF HEALTH (SDOH): HOW HARD IS IT FOR YOU TO PAY FOR THE VERY BASICS LIKE FOOD, HOUSING, MEDICAL CARE, AND HEATING?: HARD

## 2022-10-14 NOTE — PATIENT INSTRUCTIONS
New Updates for My [facility]    Thank you for choosing US to give you the best care! [facility] ([facility]) is always trying to think of new ways to help their patients. We are asking all patients to try out the new digital registration that is now available through the new [facility], feel free to download today. Via the yvonne you're now able to update your personal and registration information prior to your upcoming appointment. This will save you time once you arrive at the office to check-in, not to mention your information remains safe!! Many other perks come from signing up for an account, such as:  Requesting refills  Scheduling an appointment  Completing an E-Visit  Sending a message to the office/provider  Having access to your medication list  Paying your bill/copay prior to your appointment  Scheduling your yearly mammogram  Review your test results    If you are not familiar with the [facility], please ask one of us and we will be happy to answer any questions or help you set-up your account.

## 2022-10-14 NOTE — ASSESSMENT & PLAN NOTE
Unclear control, continue current medications, continue current treatment plan, medication adherence emphasized and lifestyle modifications recommended

## 2022-10-14 NOTE — PROGRESS NOTES
[address] [facility],6Th Floor  [address] [facility]  [address]  10/14/2022    Stephanie Bautista is a 40 y.o. female who presents today for her medical conditions and/or complaints as noted below. Stephanie Bautista is scheduled today for New Patient (Surgery scheduled October 25th, /Having pelvic pain this morning. /)  . HPI:     This is a 71-year-old female presenting to the office to establish care and discuss multiple medical concerns. Patient has a PMH of secondary hypertension, insulin resistance, PCOS, dysmenorrhea, abnormal uterine bleeding, depression, pelvic pain, endometrial thickening, hypothyroidism. Patient is scheduled to have a full hysterectomy to include uterus, cervix and fallopian tubes on 10/25/2022. Patient has been suffering for years with dysmenorrhea, heavy vaginal bleeding and severe uterine cramping. She recently completed preoperative testing and EKG. Patient states her mood is overall stable. She states she is doing well on all of her current medications. She states focuses on a healthy diet and stays well-hydrated. She denies any alcohol, illicit drug or vape use. She states she does smoke tobacco socially. Previous notes reviewed the patient's chart. Previous labs reviewed to include CBC, CMP and TSH. Previous EKG reviewed as well. Vitals:    10/14/22 0815 10/14/22 0841   BP: (!) 145/101 136/78   Site: Left Upper Arm    Position: Sitting    Cuff Size: Large Adult    Pulse: 98    Weight: 256 lb (116.1 kg)    Height: 5' 8\" (1.727 m)       Past Medical History:   Diagnosis Date    COVID-19     not hospitalized . Symptoms 1 month. Headaches, no smell, and \"confusion\".  Now back to normal    Depression     on rx per pcp    Elevated blood pressure reading     pt states at doctor's visits bp is high initially then decreases to 062 systolic    Endometriosis     Fibroid uterus 2018    PCOS (polycystic ovarian syndrome)     Snores     denies sleep apnea    Thyroid disease     Wears eyeglasses     Wellness examination     [facility]. [facility]  taking over for Dr. Leidy Mccoy. Pt. to see for clearance but does not have an appointment yet. Past Surgical History:   Procedure Laterality Date     SECTION      TUBAL LIGATION      WISDOM TOOTH EXTRACTION       Family History   Problem Relation Age of Onset    Thyroid Disease Mother     Hypertension Mother     Other Father     Thyroid Disease Maternal Grandmother     Hypertension Brother     Thyroid Disease Maternal Aunt     Ovarian Cancer Maternal Aunt     Cervical Cancer Paternal Aunt      Social History     Tobacco Use    Smoking status: Some Days     Packs/day: 0.25     Years: 0.50     Pack years: 0.13     Types: Cigarettes     Start date: 2022    Smokeless tobacco: Never    Tobacco comments:     Smokes \"socially. Not every day\" 1-2 cigarettes/week   Substance Use Topics    Alcohol use: Not Currently      Current Outpatient Medications   Medication Sig Dispense Refill    venlafaxine (EFFEXOR XR) 75 MG extended release capsule TAKE ONE CAPSULE BY MOUTH WITH FOOD ONCE DAILY 30 capsule 5    ferrous sulfate (IRON 325) 325 (65 Fe) MG tablet Take 1 tablet by mouth 2 times daily 60 tablet 3    metFORMIN (GLUCOPHAGE) 500 MG tablet Take 1 tablet by mouth daily (with breakfast) 90 tablet 5    levothyroxine (SYNTHROID) 75 MCG tablet Take 1 tablet by mouth Daily 90 tablet 1    ibuprofen (ADVIL;MOTRIN) 800 MG tablet Take 1 tablet by mouth 2 times daily as needed for Pain 60 tablet 0    norethindrone (AYGESTIN) 5 MG tablet Take 1 tablet by mouth daily 30 tablet 3    Blood Pressure KIT 1 each by Does not apply route daily as needed (Hypertension) 1 kit 0     No current facility-administered medications for this visit.        Allergies   Allergen Reactions    Shellfish-Derived Products Hives    Iodine        Health Maintenance   Topic Date Due    COVID-19 Vaccine (1) Never done    Pneumococcal 0-64 years Vaccine (1 - PCV) Never done    HIV screen  Never done    Hepatitis C screen  Never done    DTaP/Tdap/Td vaccine (1 - Tdap) Never done    Depression Monitoring  05/11/2022    Flu vaccine (1) Never done    Cervical cancer screen  01/20/2027    Hepatitis A vaccine  Aged Out    Hib vaccine  Aged Out    Meningococcal (ACWY) vaccine  Aged Out    Varicella vaccine  Discontinued       Subjective:      Review of Systems   Constitutional:  Negative for chills, fatigue and fever. HENT:  Negative for ear discharge, ear pain, sinus pressure, sinus pain, sore throat and trouble swallowing. Eyes:  Negative for discharge, redness and itching. Respiratory:  Negative for cough, chest tightness, shortness of breath and wheezing. Cardiovascular:  Negative for chest pain. Gastrointestinal:  Negative for abdominal pain, diarrhea, nausea and vomiting. Genitourinary:  Negative for difficulty urinating. Musculoskeletal:  Negative for arthralgias and neck pain. Skin:  Negative for rash. Neurological:  Negative for dizziness, weakness, light-headedness and headaches. All other systems reviewed and are negative. Objective:     Physical Exam  Vitals reviewed. Constitutional:       General: She is not in acute distress. Appearance: Normal appearance. She is obese. She is not ill-appearing. HENT:      Head: Normocephalic and atraumatic. Nose: Nose normal.      Mouth/Throat:      Mouth: Mucous membranes are moist.      Pharynx: Oropharynx is clear. Eyes:      Extraocular Movements: Extraocular movements intact. Conjunctiva/sclera: Conjunctivae normal.      Pupils: Pupils are equal, round, and reactive to light. Cardiovascular:      Rate and Rhythm: Normal rate and regular rhythm. Pulses: Normal pulses. Heart sounds: Normal heart sounds. No murmur heard. Pulmonary:      Effort: Pulmonary effort is normal. No respiratory distress. Breath sounds: Normal breath sounds. No wheezing, rhonchi or rales. Abdominal:      General: Abdomen is flat. Bowel sounds are normal. There is no distension. Palpations: Abdomen is soft. Tenderness: There is no abdominal tenderness. There is no right CVA tenderness, left CVA tenderness, guarding or rebound. Musculoskeletal:         General: Normal range of motion. Cervical back: Normal range of motion and neck supple. No rigidity or tenderness. Lymphadenopathy:      Cervical: No cervical adenopathy. Skin:     General: Skin is warm and dry. Capillary Refill: Capillary refill takes less than 2 seconds. Neurological:      General: No focal deficit present. Mental Status: She is alert and oriented to person, place, and time. Psychiatric:         Mood and Affect: Mood normal.        Assessment/Plan:      1. Depression, unspecified depression type  Assessment & Plan:   Well-controlled, continue current medications, continue current treatment plan, medication adherence emphasized and lifestyle modifications recommended  Orders:  -     venlafaxine (EFFEXOR XR) 75 MG extended release capsule; TAKE ONE CAPSULE BY MOUTH WITH FOOD ONCE DAILY, Disp-30 capsule, R-5Normal  2. Abnormal uterine bleeding  Assessment & Plan:   Monitored by specialist- no acute findings meriting change in the plan  Orders:  -     ibuprofen (ADVIL;MOTRIN) 800 MG tablet; Take 1 tablet by mouth 2 times daily as needed for Pain, Disp-60 tablet, R-0Normal  -     norethindrone (AYGESTIN) 5 MG tablet; Take 1 tablet by mouth daily, Disp-30 tablet, R-3Normal  3. Pelvic pain  Assessment & Plan:   Monitored by specialist- no acute findings meriting change in the plan  Orders:  -     ibuprofen (ADVIL;MOTRIN) 800 MG tablet; Take 1 tablet by mouth 2 times daily as needed for Pain, Disp-60 tablet, R-0Normal  -     norethindrone (AYGESTIN) 5 MG tablet; Take 1 tablet by mouth daily, Disp-30 tablet, R-3Normal  4.  Menorrhagia with regular cycle  Assessment & Plan:   Monitored by specialist- no acute findings meriting change in the plan  Orders:  -     ferrous sulfate (IRON 325) 325 (65 Fe) MG tablet; Take 1 tablet by mouth 2 times daily, Disp-60 tablet, R-3Normal  -     ibuprofen (ADVIL;MOTRIN) 800 MG tablet; Take 1 tablet by mouth 2 times daily as needed for Pain, Disp-60 tablet, R-0Normal  5. Dysmenorrhea  Assessment & Plan:   Unclear control, continue current medications, continue current treatment plan, medication adherence emphasized and lifestyle modifications recommended  Orders:  -     norethindrone (AYGESTIN) 5 MG tablet; Take 1 tablet by mouth daily, Disp-30 tablet, R-3Normal  6. Insulin resistance  Assessment & Plan:   Unclear control, continue current medications, continue current treatment plan, medication adherence emphasized and lifestyle modifications recommended  Orders:  -     metFORMIN (GLUCOPHAGE) 500 MG tablet; Take 1 tablet by mouth daily (with breakfast), Disp-90 tablet, R-5Normal  7. Acquired hypothyroidism  -     levothyroxine (SYNTHROID) 75 MCG tablet; Take 1 tablet by mouth Daily, Disp-90 tablet, R-1Normal  8. Secondary hypertension  -     Blood Pressure KIT; DAILY PRN Starting Fri 10/14/2022, Disp-1 kit, R-0, Normal  9. Establishing care with new doctor, encounter for       Return in about 3 months (around 1/14/2023) for Physical .  No orders of the defined types were placed in this encounter.     Orders Placed This Encounter   Medications    venlafaxine (EFFEXOR XR) 75 MG extended release capsule     Sig: TAKE ONE CAPSULE BY MOUTH WITH FOOD ONCE DAILY     Dispense:  30 capsule     Refill:  5    ferrous sulfate (IRON 325) 325 (65 Fe) MG tablet     Sig: Take 1 tablet by mouth 2 times daily     Dispense:  60 tablet     Refill:  3    metFORMIN (GLUCOPHAGE) 500 MG tablet     Sig: Take 1 tablet by mouth daily (with breakfast)     Dispense:  90 tablet     Refill:  5    levothyroxine (SYNTHROID) 75 MCG tablet     Sig: Take 1 tablet by mouth Daily     Dispense:  90 tablet     Refill:  1 ibuprofen (ADVIL;MOTRIN) 800 MG tablet     Sig: Take 1 tablet by mouth 2 times daily as needed for Pain     Dispense:  60 tablet     Refill:  0    norethindrone (AYGESTIN) 5 MG tablet     Sig: Take 1 tablet by mouth daily     Dispense:  30 tablet     Refill:  3    Blood Pressure KIT     Si each by Does not apply route daily as needed (Hypertension)     Dispense:  1 kit     Refill:  0       Reviewed health maintenance, prior labs and imaging. Patient given educational materials - see patient instructions. Discussed use, benefit, and side effects of prescribed medications. Barriers to medication compliance addressed. All patient questions answered. Pt voiced understanding to plan of care. Instructed to continue medications as discussed, healthy diet and exercise. Patient agreed with treatment plan. Follow up as directed below. This note is created with the assistance of a speech-recognition program. While intending to generate a document that actually reflects the content of the visit, no guarantees can be provided that every mistake has been identified and corrected by editing.     Electronically signed by NELLY Dailey CNP, APRN-CNP on 10/14/2022 at 9:12 AM

## 2022-10-21 ENCOUNTER — TELEPHONE (OUTPATIENT)
Dept: FAMILY MEDICINE CLINIC | Age: 37
End: 2022-10-21

## 2022-10-21 ENCOUNTER — TELEPHONE (OUTPATIENT)
Dept: OBGYN CLINIC | Age: 37
End: 2022-10-21

## 2022-10-21 NOTE — TELEPHONE ENCOUNTER
Called [facility] & spoke with Cele Mathew who will be sending [facility] [employer] a message regarding surgery clearance

## 2022-10-21 NOTE — TELEPHONE ENCOUNTER
[facility] called to follow up  on surgical clearance for the patient her surgery is scheduled for 10 25 2022 .  We can fax it them

## 2022-10-21 NOTE — TELEPHONE ENCOUNTER
She stated that the patient spoke with you about a clearance letter.   You can write one and fax it over if you're going to clear her

## 2022-10-21 NOTE — TELEPHONE ENCOUNTER
Called pt to follow up on pre op visit 10/14/22  Pt states was cleared for surgery   Will contact PCP office for verification

## 2022-10-24 ENCOUNTER — ANESTHESIA EVENT (OUTPATIENT)
Dept: OPERATING ROOM | Age: 37
End: 2022-10-24
Payer: MEDICAID

## 2022-10-24 NOTE — H&P
OB/GYN Pre-Op H&P  [facility]    Patient Name: Angel Eid     Patient : 1985  [facility]  Admission Date/Time: 10/25/2022  9:45 AM  Primary Care Physician: NELLY Johns CNP  MRN: [id number]    Date: 10/25/2022  Time: 11:52 AM    The patient was seen in pre-op holding. She is here for TLH, BS, Cystoscopy, possible open and any other indicated procedure. Patient is a 40year old female with a history of pelvic pain, dysmenorrhea and AUB. Patient declines any further medical management and desires definitive surgical management. She had a TUVS that showed uterus measuring about 9 cm, otherwise unremarkable. The procedure risks and complications were reviewed. The labs, Consent, and H&P were reviewed and updated. The patient was counseled on the possibility of  the need of a second surgery. The patient voiced understanding and had all of her questions answered. The possibility of incomplete removal of abnormal tissue was discussed. OBSTETRICAL HISTORY:   OB History    Para Term  AB Living   3 3 3 0 0 3   SAB IAB Ectopic Molar Multiple Live Births   0 0 0 0 0 3      # Outcome Date GA Lbr Eldon/2nd Weight Sex Delivery Anes PTL Lv   3 Term 2018    F CS-LTranv   SILVINO   2 Term 2016 [id number]   F Vag-Spont   SILVINO   1 Term 2006    F Vag-Spont   SILVINO       PAST MEDICAL HISTORY:   has a past medical history of COVID-19, Depression, Elevated blood pressure reading, Endometriosis, Fibroid uterus, PCOS (polycystic ovarian syndrome), Snores, Thyroid disease, Wears eyeglasses, and Wellness examination. PAST SURGICAL HISTORY:   has a past surgical history that includes  section; Tubal ligation; and [place] tooth extraction ().     ALLERGIES:  Allergies as of 10/04/2022 - Fully Reviewed 2022   Allergen Reaction Noted    Shellfish-derived products Hives 2021    Iodine  2021       MEDICATIONS:  Current Facility-Administered Medications   Medication Dose Route Frequency Provider Last Rate Last Admin    lactated ringers infusion 1,000 mL  1,000 mL IntraVENous Continuous Conchita Stoner MD 50 mL/hr at 10/25/22 1140 1,000 mL at 10/25/22 1140    ceFAZolin (ANCEF) 2000 mg in sterile water 20 mL IV syringe  2,000 mg IntraVENous Once Omega Rivers DO           FAMILY HISTORY:  family history includes Cervical Cancer in her paternal aunt; Hypertension in her brother and mother; Other in her father; Ovarian Cancer in her maternal aunt; Thyroid Disease in her maternal aunt, maternal grandmother, and mother. SOCIAL HISTORY:   reports that she has been smoking cigarettes. She started smoking about 6 months ago. She has a 0.13 pack-year smoking history. She has never used smokeless tobacco. She reports that she does not currently use alcohol. She reports that she does not use drugs.     VITALS:  Vitals:    10/25/22 1130   BP: (!) 145/92   Pulse: 80   Resp: 18   Temp: 96.8 °F (36 °C)   TempSrc: Temporal   SpO2: 97%   Weight: 252 lb (114.3 kg)   Height: 5' 8\" (1.727 m)                                                                                                                                 PHYSICAL EXAM:     Unchanged from Prior H&P  CONSTITUTIONAL:  Alert and oriented, no acute distress  HEAD: normocephalic, atraumatic  EYES: Pupils equal and reactive to light, Extraocular muscles intact, sclera non icteric  ENT: Mucus membranes moist, No otorrhea, no rhinorrhea  NECK:  supple, symmetrical, trachea midline   LUNGS:  Good air movement bilaterally, unlabored respirations, no wheezes or rhonchi  CARDIOVASCULAR: Regular rate and rhythm   ABDOMEN: soft, non tender, non distended, no rebound or guarding, no hernias, no hepatomegaly, no splenomegly  MUSCULOSKELETAL:  Equal strength bilaterally, normal muscle tone  SKIN: No abscess or rash  NEUROLOGIC:   no focal deficits  PSYCH: affect appropriate  Pelvic Exam: deferred to OR      LAB RESULTS:  Hospital Outpatient Visit on 10/13/2022   Component Date Value Ref Range Status    WBC 10/13/2022 9.9  3.5 - 11.3 k/uL Final    RBC 10/13/2022 4.20  3.95 - 5.11 m/uL Final    Hemoglobin 10/13/2022 12.5  11.9 - 15.1 g/dL Final    Hematocrit 10/13/2022 36.9  36.3 - 47.1 % Final    MCV 10/13/2022 87.9  82.6 - 102.9 fL Final    MCH 10/13/2022 29.8  25.2 - 33.5 pg Final    MCHC 10/13/2022 33.9  28.4 - 34.8 g/dL Final    RDW 10/13/2022 12.1  11.8 - 14.4 % Final    Platelets 83/67/4815 298  138 - 453 k/uL Final    MPV 10/13/2022 9.5  8.1 - 13.5 fL Final    NRBC Automated 10/13/2022 0.0  0.0 per 100 WBC Final    Expiration Date 10/13/2022 10/28/2022,2359   Final    Arm Band Number 10/13/2022 BE [id number]   Final    ABO/Rh 10/13/2022 A NEGATIVE   Final    Antibody Screen 10/13/2022 NEGATIVE   Final    Glucose 10/13/2022 90  70 - 99 mg/dL Final    BUN 10/13/2022 10  6 - 20 mg/dL Final    Creatinine 10/13/2022 0.74  0.50 - 0.90 mg/dL Final    Est, Glom Filt Rate 10/13/2022 >60  >60 mL/min/1.73m2 Final    Comment:       Effective Oct 3, 2022        These results are not intended for use in patients <25years of age. eGFR results are calculated without a race factor using the 2021 CKD-EPI equation. Careful clinical correlation is recommended, particularly when comparing to results   calculated using previous equations. The CKD-EPI equation is less accurate in patients with extremes of muscle mass, extra-renal   metabolism of creatine, excessive creatine ingestion, or following therapy that affects   renal tubular secretion.       Calcium 10/13/2022 9.3  8.6 - 10.4 mg/dL Final    Sodium 10/13/2022 138  135 - 144 mmol/L Final    Potassium 10/13/2022 4.2  3.7 - 5.3 mmol/L Final    Chloride 10/13/2022 102  98 - 107 mmol/L Final    CO2 10/13/2022 27  20 - 31 mmol/L Final    Anion Gap 10/13/2022 9  9 - 17 mmol/L Final    Alkaline Phosphatase 10/13/2022 58  35 - 104 U/L Final    ALT 10/13/2022 12  5 - 33 U/L Final    AST 10/13/2022 12  <32 U/L Final    Total Bilirubin 10/13/2022 0.5  0.3 - 1.2 mg/dL Final    Total Protein 10/13/2022 7.2  6.4 - 8.3 g/dL Final    Albumin 10/13/2022 4.5  3.5 - 5.2 g/dL Final    Albumin/Globulin Ratio 10/13/2022 1.7  1.0 - 2.5 Final    Ventricular Rate 10/13/2022 78  BPM Final    Atrial Rate 10/13/2022 78  BPM Final    P-R Interval 10/13/2022 174  ms Final    QRS Duration 10/13/2022 98  ms Final    Q-T Interval 10/13/2022 388  ms Final    QTc Calculation (Bazett) 10/13/2022 442  ms Final    P Axis 10/13/2022 25  degrees Final    R Axis 10/13/2022 71  degrees Final    T Axis 10/13/2022 46  degrees Final    HCG(Urine) Pregnancy Test 10/13/2022 NEGATIVE  NEGATIVE Final    Comment: Specimens with hCG levels near the threshold of the test (25 mIU/mL) may give a negative or   indeterminate result. In such cases, another test should be performed with a new specimen   in 48-72 hours. If early pregnancy is suspected clinically in this setting, correlation   with quantitative serum b-hCG level is suggested. DIAGNOSTICS:  No radiation information found for this patient  Narrative   EXAMINATION:   PELVIC ULTRASOUND       9/19/2022       TECHNIQUE:   Transabdominal pelvic ultrasound was performed. COMPARISON:   None       HISTORY:   ORDERING SYSTEM PROVIDED HISTORY: Abnormal uterine bleeding   TECHNOLOGIST PROVIDED HISTORY:   This procedure can be scheduled via [employer]. Access your [employer] account by   visiting [email or web address]. FINDINGS:       Measurements:       Uterus: 8.9 x 5.2 x 6.7 cm       Endometrial stripe: 5.5 mm       Right Ovary:2.5 x 1.6 x 1.6 cm       Left Ovary: 2.9 x 1.8 x 2.2 cm           Ultrasound Findings:       Uterus: Uterus has a somewhat heterogeneous myometrial echotexture with no   focal masses identified by ultrasound. Endometrial stripe: Endometrial stripe is within normal limits. Right Ovary: Right ovary is within normal limits.        Left Ovary:  Left ovary is within normal limits. Free Fluid: Trace free fluid in the cul-de-sac, likely physiologic           Impression   Essentially unremarkable pelvic ultrasound           DIAGNOSIS & PLAN:  1. Pelvic Pain  2. Dysmenorrhea  3. AUB   - Proceed with planned procedure: TLH, BS, Cystoscopy, possible open and any other indicated procedure   - Consent signed, on chart. - The patient is ready for transport to the operative suite. Counseling: The patient was counseled on all options both medical and surgical, conservative as well as definitive. She has elected to proceed with the procedure as stated above. The patient was counseled on the procedure. Risks and complications were reviewed in detail. The patients orders, labs, consents have been completed. The history and physical as well as all supporting surgical documentation will be forwarded to the pre-operative holding area. The patient is aware that this procedure may not alleviate her symptoms. That there may be a necessity for a second surgery and that there may be an incomplete removal of abnormal tissue. Candance Better, DO  Ob/Gyn Resident   [facility]  10/25/2022, 11:52 AM     Attending Physician Statement  I have personally seen, evaluated and discussed the care of [facility], including pertinent history and exam findings with the resident. I have reviewed and edited their note in the electronic medical record. The key elements of all parts of the encounter have been performed/reviewed by me. I agree with the assessment, plan and orders as documented by the resident. The level of care submitted represents to the best of my ability the care documented in the medical record today. GC Modifier. This service has been performed in part by a resident under the direction of a teaching physician.     Attending's Name:  Faustino Becerril DO  Date: 10/25/2022  Time: 1:10 PM

## 2022-10-24 NOTE — TELEPHONE ENCOUNTER
Received surgery clearance letter & faxed to [facility]'s Pre Op

## 2022-10-25 ENCOUNTER — ANESTHESIA (OUTPATIENT)
Dept: OPERATING ROOM | Age: 37
End: 2022-10-25
Payer: MEDICAID

## 2022-10-25 ENCOUNTER — HOSPITAL ENCOUNTER (OUTPATIENT)
Age: 37
Setting detail: OBSERVATION
Discharge: HOME | End: 2022-10-26
Attending: STUDENT IN AN ORGANIZED HEALTH CARE EDUCATION/TRAINING PROGRAM | Admitting: STUDENT IN AN ORGANIZED HEALTH CARE EDUCATION/TRAINING PROGRAM
Payer: MEDICAID

## 2022-10-25 DIAGNOSIS — Z90.710 S/P LAPAROSCOPIC ASSISTED VAGINAL HYSTERECTOMY (LAVH): Primary | ICD-10-CM

## 2022-10-25 DIAGNOSIS — N94.6 DYSMENORRHEA: ICD-10-CM

## 2022-10-25 DIAGNOSIS — R10.2 PELVIC PAIN: ICD-10-CM

## 2022-10-25 DIAGNOSIS — N93.9 ABNORMAL UTERINE BLEEDING: ICD-10-CM

## 2022-10-25 LAB — HCG, PREGNANCY URINE (POC): NEGATIVE

## 2022-10-25 PROCEDURE — 81025 URINE PREGNANCY TEST: CPT

## 2022-10-25 PROCEDURE — 58552 LAPARO-VAG HYST INCL T/O: CPT | Performed by: STUDENT IN AN ORGANIZED HEALTH CARE EDUCATION/TRAINING PROGRAM

## 2022-10-25 PROCEDURE — 2580000003 HC RX 258: Performed by: STUDENT IN AN ORGANIZED HEALTH CARE EDUCATION/TRAINING PROGRAM

## 2022-10-25 PROCEDURE — 3600000014 HC SURGERY LEVEL 4 ADDTL 15MIN: Performed by: STUDENT IN AN ORGANIZED HEALTH CARE EDUCATION/TRAINING PROGRAM

## 2022-10-25 PROCEDURE — 6370000000 HC RX 637 (ALT 250 FOR IP): Performed by: STUDENT IN AN ORGANIZED HEALTH CARE EDUCATION/TRAINING PROGRAM

## 2022-10-25 PROCEDURE — 7100000000 HC PACU RECOVERY - FIRST 15 MIN: Performed by: STUDENT IN AN ORGANIZED HEALTH CARE EDUCATION/TRAINING PROGRAM

## 2022-10-25 PROCEDURE — 6360000002 HC RX W HCPCS: Performed by: ANESTHESIOLOGY

## 2022-10-25 PROCEDURE — 7100000001 HC PACU RECOVERY - ADDTL 15 MIN: Performed by: STUDENT IN AN ORGANIZED HEALTH CARE EDUCATION/TRAINING PROGRAM

## 2022-10-25 PROCEDURE — 2500000003 HC RX 250 WO HCPCS: Performed by: STUDENT IN AN ORGANIZED HEALTH CARE EDUCATION/TRAINING PROGRAM

## 2022-10-25 PROCEDURE — 2500000003 HC RX 250 WO HCPCS: Performed by: ANESTHESIOLOGY

## 2022-10-25 PROCEDURE — 2720000010 HC SURG SUPPLY STERILE: Performed by: STUDENT IN AN ORGANIZED HEALTH CARE EDUCATION/TRAINING PROGRAM

## 2022-10-25 PROCEDURE — 3700000001 HC ADD 15 MINUTES (ANESTHESIA): Performed by: STUDENT IN AN ORGANIZED HEALTH CARE EDUCATION/TRAINING PROGRAM

## 2022-10-25 PROCEDURE — 6360000002 HC RX W HCPCS: Performed by: STUDENT IN AN ORGANIZED HEALTH CARE EDUCATION/TRAINING PROGRAM

## 2022-10-25 PROCEDURE — 2500000003 HC RX 250 WO HCPCS: Performed by: NURSE ANESTHETIST, CERTIFIED REGISTERED

## 2022-10-25 PROCEDURE — 3700000000 HC ANESTHESIA ATTENDED CARE: Performed by: STUDENT IN AN ORGANIZED HEALTH CARE EDUCATION/TRAINING PROGRAM

## 2022-10-25 PROCEDURE — 3600000004 HC SURGERY LEVEL 4 BASE: Performed by: STUDENT IN AN ORGANIZED HEALTH CARE EDUCATION/TRAINING PROGRAM

## 2022-10-25 PROCEDURE — 2580000003 HC RX 258: Performed by: ANESTHESIOLOGY

## 2022-10-25 PROCEDURE — 2709999900 HC NON-CHARGEABLE SUPPLY: Performed by: STUDENT IN AN ORGANIZED HEALTH CARE EDUCATION/TRAINING PROGRAM

## 2022-10-25 PROCEDURE — 88307 TISSUE EXAM BY PATHOLOGIST: CPT

## 2022-10-25 PROCEDURE — 6360000002 HC RX W HCPCS: Performed by: NURSE ANESTHETIST, CERTIFIED REGISTERED

## 2022-10-25 PROCEDURE — G0378 HOSPITAL OBSERVATION PER HR: HCPCS

## 2022-10-25 RX ORDER — NEOSTIGMINE METHYLSULFATE 5 MG/5 ML
SYRINGE (ML) INTRAVENOUS PRN
Status: DISCONTINUED | OUTPATIENT
Start: 2022-10-25 | End: 2022-10-25 | Stop reason: SDUPTHER

## 2022-10-25 RX ORDER — HYDRALAZINE HYDROCHLORIDE 20 MG/ML
10 INJECTION INTRAMUSCULAR; INTRAVENOUS
Status: DISCONTINUED | OUTPATIENT
Start: 2022-10-25 | End: 2022-10-25 | Stop reason: HOSPADM

## 2022-10-25 RX ORDER — SODIUM CHLORIDE 9 MG/ML
INJECTION, SOLUTION INTRAVENOUS PRN
Status: DISCONTINUED | OUTPATIENT
Start: 2022-10-25 | End: 2022-10-26 | Stop reason: HOSPADM

## 2022-10-25 RX ORDER — IBUPROFEN 800 MG/1
800 TABLET ORAL EVERY 8 HOURS PRN
Qty: 60 TABLET | Refills: 0 | Status: SHIPPED | OUTPATIENT
Start: 2022-10-25 | End: 2022-10-31 | Stop reason: ALTCHOICE

## 2022-10-25 RX ORDER — DROPERIDOL 2.5 MG/ML
0.62 INJECTION, SOLUTION INTRAMUSCULAR; INTRAVENOUS
Status: DISCONTINUED | OUTPATIENT
Start: 2022-10-25 | End: 2022-10-25 | Stop reason: HOSPADM

## 2022-10-25 RX ORDER — MINERAL OIL AND WHITE PETROLATUM 150; 830 MG/G; MG/G
OINTMENT OPHTHALMIC PRN
Status: DISCONTINUED | OUTPATIENT
Start: 2022-10-25 | End: 2022-10-26 | Stop reason: HOSPADM

## 2022-10-25 RX ORDER — SIMETHICONE 80 MG
80 TABLET,CHEWABLE ORAL 4 TIMES DAILY PRN
Qty: 180 TABLET | Refills: 3 | Status: SHIPPED | OUTPATIENT
Start: 2022-10-25

## 2022-10-25 RX ORDER — DOCUSATE SODIUM 100 MG/1
100 CAPSULE, LIQUID FILLED ORAL DAILY
Status: DISCONTINUED | OUTPATIENT
Start: 2022-10-25 | End: 2022-10-26 | Stop reason: HOSPADM

## 2022-10-25 RX ORDER — ACETAMINOPHEN 500 MG
1000 TABLET ORAL EVERY 6 HOURS
Status: DISCONTINUED | OUTPATIENT
Start: 2022-10-25 | End: 2022-10-26 | Stop reason: HOSPADM

## 2022-10-25 RX ORDER — SODIUM CHLORIDE 0.9 % (FLUSH) 0.9 %
5-40 SYRINGE (ML) INJECTION EVERY 12 HOURS SCHEDULED
Status: DISCONTINUED | OUTPATIENT
Start: 2022-10-25 | End: 2022-10-26 | Stop reason: HOSPADM

## 2022-10-25 RX ORDER — CHOLECALCIFEROL (VITAMIN D3) 125 MCG
5 CAPSULE ORAL NIGHTLY PRN
Status: DISCONTINUED | OUTPATIENT
Start: 2022-10-25 | End: 2022-10-26 | Stop reason: HOSPADM

## 2022-10-25 RX ORDER — GLYCOPYRROLATE 1 MG/5 ML
SYRINGE (ML) INTRAVENOUS PRN
Status: DISCONTINUED | OUTPATIENT
Start: 2022-10-25 | End: 2022-10-25 | Stop reason: SDUPTHER

## 2022-10-25 RX ORDER — DIPHENHYDRAMINE HYDROCHLORIDE 50 MG/ML
12.5 INJECTION INTRAMUSCULAR; INTRAVENOUS
Status: DISCONTINUED | OUTPATIENT
Start: 2022-10-25 | End: 2022-10-25 | Stop reason: HOSPADM

## 2022-10-25 RX ORDER — ONDANSETRON 2 MG/ML
INJECTION INTRAMUSCULAR; INTRAVENOUS PRN
Status: DISCONTINUED | OUTPATIENT
Start: 2022-10-25 | End: 2022-10-25 | Stop reason: SDUPTHER

## 2022-10-25 RX ORDER — SODIUM CHLORIDE 0.9 % (FLUSH) 0.9 %
5-40 SYRINGE (ML) INJECTION PRN
Status: DISCONTINUED | OUTPATIENT
Start: 2022-10-25 | End: 2022-10-25 | Stop reason: HOSPADM

## 2022-10-25 RX ORDER — SODIUM CHLORIDE, SODIUM LACTATE, POTASSIUM CHLORIDE, CALCIUM CHLORIDE 600; 310; 30; 20 MG/100ML; MG/100ML; MG/100ML; MG/100ML
1000 INJECTION, SOLUTION INTRAVENOUS CONTINUOUS
Status: DISCONTINUED | OUTPATIENT
Start: 2022-10-25 | End: 2022-10-25 | Stop reason: HOSPADM

## 2022-10-25 RX ORDER — OXYCODONE HYDROCHLORIDE 5 MG/1
10 TABLET ORAL EVERY 4 HOURS PRN
Status: DISCONTINUED | OUTPATIENT
Start: 2022-10-25 | End: 2022-10-26 | Stop reason: HOSPADM

## 2022-10-25 RX ORDER — LIDOCAINE HYDROCHLORIDE 10 MG/ML
INJECTION, SOLUTION EPIDURAL; INFILTRATION; INTRACAUDAL; PERINEURAL PRN
Status: DISCONTINUED | OUTPATIENT
Start: 2022-10-25 | End: 2022-10-25 | Stop reason: SDUPTHER

## 2022-10-25 RX ORDER — GLYCOPYRROLATE 0.2 MG/ML
INJECTION INTRAMUSCULAR; INTRAVENOUS PRN
Status: DISCONTINUED | OUTPATIENT
Start: 2022-10-25 | End: 2022-10-25 | Stop reason: SDUPTHER

## 2022-10-25 RX ORDER — LEVOTHYROXINE SODIUM 0.05 MG/1
50 TABLET ORAL DAILY
Status: DISCONTINUED | OUTPATIENT
Start: 2022-10-26 | End: 2022-10-26 | Stop reason: HOSPADM

## 2022-10-25 RX ORDER — MEPERIDINE HYDROCHLORIDE 50 MG/ML
12.5 INJECTION INTRAMUSCULAR; INTRAVENOUS; SUBCUTANEOUS EVERY 5 MIN PRN
Status: DISCONTINUED | OUTPATIENT
Start: 2022-10-25 | End: 2022-10-25 | Stop reason: HOSPADM

## 2022-10-25 RX ORDER — MAGNESIUM HYDROXIDE 1200 MG/15ML
LIQUID ORAL CONTINUOUS PRN
Status: DISCONTINUED | OUTPATIENT
Start: 2022-10-25 | End: 2022-10-25 | Stop reason: HOSPADM

## 2022-10-25 RX ORDER — SODIUM CHLORIDE 0.9 % (FLUSH) 0.9 %
5-40 SYRINGE (ML) INJECTION PRN
Status: DISCONTINUED | OUTPATIENT
Start: 2022-10-25 | End: 2022-10-26 | Stop reason: HOSPADM

## 2022-10-25 RX ORDER — KETOROLAC TROMETHAMINE 5 MG/ML
1 SOLUTION OPHTHALMIC 4 TIMES DAILY
Status: DISCONTINUED | OUTPATIENT
Start: 2022-10-25 | End: 2022-10-26 | Stop reason: HOSPADM

## 2022-10-25 RX ORDER — BUPIVACAINE HYDROCHLORIDE 5 MG/ML
INJECTION, SOLUTION PERINEURAL PRN
Status: DISCONTINUED | OUTPATIENT
Start: 2022-10-25 | End: 2022-10-25 | Stop reason: HOSPADM

## 2022-10-25 RX ORDER — SODIUM CHLORIDE, SODIUM LACTATE, POTASSIUM CHLORIDE, CALCIUM CHLORIDE 600; 310; 30; 20 MG/100ML; MG/100ML; MG/100ML; MG/100ML
INJECTION, SOLUTION INTRAVENOUS CONTINUOUS PRN
Status: DISCONTINUED | OUTPATIENT
Start: 2022-10-25 | End: 2022-10-25 | Stop reason: SDUPTHER

## 2022-10-25 RX ORDER — IBUPROFEN 600 MG/1
600 TABLET ORAL EVERY 6 HOURS
Status: DISCONTINUED | OUTPATIENT
Start: 2022-10-26 | End: 2022-10-26 | Stop reason: HOSPADM

## 2022-10-25 RX ORDER — FENTANYL CITRATE 50 UG/ML
INJECTION, SOLUTION INTRAMUSCULAR; INTRAVENOUS PRN
Status: DISCONTINUED | OUTPATIENT
Start: 2022-10-25 | End: 2022-10-25 | Stop reason: SDUPTHER

## 2022-10-25 RX ORDER — ACETAMINOPHEN 500 MG
1000 TABLET ORAL 3 TIMES DAILY
Qty: 540 TABLET | Refills: 1 | Status: SHIPPED | OUTPATIENT
Start: 2022-10-25

## 2022-10-25 RX ORDER — PROPOFOL 10 MG/ML
INJECTION, EMULSION INTRAVENOUS PRN
Status: DISCONTINUED | OUTPATIENT
Start: 2022-10-25 | End: 2022-10-25 | Stop reason: SDUPTHER

## 2022-10-25 RX ORDER — ONDANSETRON 2 MG/ML
4 INJECTION INTRAMUSCULAR; INTRAVENOUS EVERY 6 HOURS PRN
Status: DISCONTINUED | OUTPATIENT
Start: 2022-10-25 | End: 2022-10-26 | Stop reason: HOSPADM

## 2022-10-25 RX ORDER — MIDAZOLAM HYDROCHLORIDE 1 MG/ML
INJECTION INTRAMUSCULAR; INTRAVENOUS PRN
Status: DISCONTINUED | OUTPATIENT
Start: 2022-10-25 | End: 2022-10-25 | Stop reason: SDUPTHER

## 2022-10-25 RX ORDER — KETOROLAC TROMETHAMINE 15 MG/ML
15 INJECTION, SOLUTION INTRAMUSCULAR; INTRAVENOUS EVERY 6 HOURS
Status: DISPENSED | OUTPATIENT
Start: 2022-10-25 | End: 2022-10-26

## 2022-10-25 RX ORDER — OXYCODONE HYDROCHLORIDE 5 MG/1
5 TABLET ORAL EVERY 6 HOURS PRN
Qty: 20 TABLET | Refills: 0 | Status: SHIPPED | OUTPATIENT
Start: 2022-10-25 | End: 2022-11-01

## 2022-10-25 RX ORDER — SODIUM CHLORIDE 0.9 % (FLUSH) 0.9 %
5-40 SYRINGE (ML) INJECTION EVERY 12 HOURS SCHEDULED
Status: DISCONTINUED | OUTPATIENT
Start: 2022-10-25 | End: 2022-10-25 | Stop reason: HOSPADM

## 2022-10-25 RX ORDER — SODIUM CHLORIDE, SODIUM LACTATE, POTASSIUM CHLORIDE, CALCIUM CHLORIDE 600; 310; 30; 20 MG/100ML; MG/100ML; MG/100ML; MG/100ML
INJECTION, SOLUTION INTRAVENOUS CONTINUOUS
Status: DISCONTINUED | OUTPATIENT
Start: 2022-10-25 | End: 2022-10-26 | Stop reason: HOSPADM

## 2022-10-25 RX ORDER — ONDANSETRON 4 MG/1
4 TABLET, FILM COATED ORAL DAILY PRN
Qty: 30 TABLET | Refills: 0 | Status: SHIPPED | OUTPATIENT
Start: 2022-10-25

## 2022-10-25 RX ORDER — ROCURONIUM BROMIDE 10 MG/ML
INJECTION, SOLUTION INTRAVENOUS PRN
Status: DISCONTINUED | OUTPATIENT
Start: 2022-10-25 | End: 2022-10-25 | Stop reason: SDUPTHER

## 2022-10-25 RX ORDER — DIPHENHYDRAMINE HYDROCHLORIDE 50 MG/ML
25 INJECTION INTRAMUSCULAR; INTRAVENOUS EVERY 6 HOURS PRN
Status: DISCONTINUED | OUTPATIENT
Start: 2022-10-25 | End: 2022-10-26 | Stop reason: HOSPADM

## 2022-10-25 RX ORDER — VENLAFAXINE HYDROCHLORIDE 75 MG/1
75 CAPSULE, EXTENDED RELEASE ORAL
Status: DISCONTINUED | OUTPATIENT
Start: 2022-10-26 | End: 2022-10-26 | Stop reason: HOSPADM

## 2022-10-25 RX ORDER — OXYCODONE HYDROCHLORIDE 5 MG/1
5 TABLET ORAL EVERY 4 HOURS PRN
Status: DISCONTINUED | OUTPATIENT
Start: 2022-10-25 | End: 2022-10-26 | Stop reason: HOSPADM

## 2022-10-25 RX ORDER — METOCLOPRAMIDE HYDROCHLORIDE 5 MG/ML
10 INJECTION INTRAMUSCULAR; INTRAVENOUS
Status: DISCONTINUED | OUTPATIENT
Start: 2022-10-25 | End: 2022-10-25 | Stop reason: HOSPADM

## 2022-10-25 RX ORDER — DIPHENHYDRAMINE HYDROCHLORIDE 50 MG/ML
INJECTION INTRAMUSCULAR; INTRAVENOUS PRN
Status: DISCONTINUED | OUTPATIENT
Start: 2022-10-25 | End: 2022-10-25 | Stop reason: SDUPTHER

## 2022-10-25 RX ORDER — SODIUM CHLORIDE 9 MG/ML
25 INJECTION, SOLUTION INTRAVENOUS PRN
Status: DISCONTINUED | OUTPATIENT
Start: 2022-10-25 | End: 2022-10-25 | Stop reason: HOSPADM

## 2022-10-25 RX ORDER — FAMOTIDINE 20 MG/1
20 TABLET, FILM COATED ORAL 2 TIMES DAILY PRN
Status: DISCONTINUED | OUTPATIENT
Start: 2022-10-25 | End: 2022-10-26 | Stop reason: HOSPADM

## 2022-10-25 RX ORDER — KETOROLAC TROMETHAMINE 30 MG/ML
INJECTION, SOLUTION INTRAMUSCULAR; INTRAVENOUS PRN
Status: DISCONTINUED | OUTPATIENT
Start: 2022-10-25 | End: 2022-10-25 | Stop reason: SDUPTHER

## 2022-10-25 RX ADMIN — SODIUM CHLORIDE, POTASSIUM CHLORIDE, SODIUM LACTATE AND CALCIUM CHLORIDE: 600; 310; 30; 20 INJECTION, SOLUTION INTRAVENOUS at 14:24

## 2022-10-25 RX ADMIN — KETOROLAC TROMETHAMINE 1 DROP: 5 SOLUTION/ DROPS OPHTHALMIC at 20:46

## 2022-10-25 RX ADMIN — FENTANYL CITRATE 50 MCG: 50 INJECTION, SOLUTION INTRAMUSCULAR; INTRAVENOUS at 14:29

## 2022-10-25 RX ADMIN — LIDOCAINE HYDROCHLORIDE 50 MG: 10 INJECTION, SOLUTION EPIDURAL; INFILTRATION; INTRACAUDAL; PERINEURAL at 13:17

## 2022-10-25 RX ADMIN — ACETAMINOPHEN 1000 MG: 500 TABLET ORAL at 20:55

## 2022-10-25 RX ADMIN — MEPERIDINE HYDROCHLORIDE 12.5 MG: 50 INJECTION, SOLUTION INTRAMUSCULAR; INTRAVENOUS; SUBCUTANEOUS at 18:28

## 2022-10-25 RX ADMIN — SODIUM CHLORIDE, POTASSIUM CHLORIDE, SODIUM LACTATE AND CALCIUM CHLORIDE: 600; 310; 30; 20 INJECTION, SOLUTION INTRAVENOUS at 22:08

## 2022-10-25 RX ADMIN — Medication 4 MG: at 17:53

## 2022-10-25 RX ADMIN — MIDAZOLAM 2 MG: 1 INJECTION INTRAMUSCULAR; INTRAVENOUS at 13:14

## 2022-10-25 RX ADMIN — PHENYLEPHRINE HYDROCHLORIDE 100 MCG: 10 INJECTION INTRAVENOUS at 16:50

## 2022-10-25 RX ADMIN — GLYCOPYRROLATE 0.4 MG: 0.2 INJECTION INTRAMUSCULAR; INTRAVENOUS at 17:53

## 2022-10-25 RX ADMIN — FENTANYL CITRATE 50 MCG: 50 INJECTION, SOLUTION INTRAMUSCULAR; INTRAVENOUS at 18:04

## 2022-10-25 RX ADMIN — PHENYLEPHRINE HYDROCHLORIDE 100 MCG: 10 INJECTION INTRAVENOUS at 14:42

## 2022-10-25 RX ADMIN — KETOROLAC TROMETHAMINE 30 MG: 30 INJECTION, SOLUTION INTRAMUSCULAR at 17:51

## 2022-10-25 RX ADMIN — Medication 0.2 MG: at 14:44

## 2022-10-25 RX ADMIN — PHENYLEPHRINE HYDROCHLORIDE 100 MCG: 10 INJECTION INTRAVENOUS at 15:13

## 2022-10-25 RX ADMIN — ONDANSETRON 4 MG: 2 INJECTION INTRAMUSCULAR; INTRAVENOUS at 16:12

## 2022-10-25 RX ADMIN — PROPOFOL 150 MG: 10 INJECTION, EMULSION INTRAVENOUS at 13:30

## 2022-10-25 RX ADMIN — HYDROMORPHONE HYDROCHLORIDE 0.25 MG: 1 INJECTION, SOLUTION INTRAMUSCULAR; INTRAVENOUS; SUBCUTANEOUS at 18:37

## 2022-10-25 RX ADMIN — ROCURONIUM BROMIDE 50 MG: 10 INJECTION, SOLUTION INTRAVENOUS at 13:17

## 2022-10-25 RX ADMIN — ROCURONIUM BROMIDE 10 MG: 10 INJECTION, SOLUTION INTRAVENOUS at 14:59

## 2022-10-25 RX ADMIN — FENTANYL CITRATE 100 MCG: 50 INJECTION, SOLUTION INTRAMUSCULAR; INTRAVENOUS at 13:17

## 2022-10-25 RX ADMIN — DIPHENHYDRAMINE HYDROCHLORIDE 12.5 MG: 50 INJECTION, SOLUTION INTRAMUSCULAR; INTRAVENOUS at 15:13

## 2022-10-25 RX ADMIN — FENTANYL CITRATE 25 MCG: 50 INJECTION, SOLUTION INTRAMUSCULAR; INTRAVENOUS at 17:10

## 2022-10-25 RX ADMIN — PROPOFOL 200 MG: 10 INJECTION, EMULSION INTRAVENOUS at 13:17

## 2022-10-25 RX ADMIN — DOCUSATE SODIUM 100 MG: 100 CAPSULE ORAL at 20:55

## 2022-10-25 RX ADMIN — PHENYLEPHRINE HYDROCHLORIDE 100 MCG: 10 INJECTION INTRAVENOUS at 15:46

## 2022-10-25 RX ADMIN — SODIUM CHLORIDE, POTASSIUM CHLORIDE, SODIUM LACTATE AND CALCIUM CHLORIDE 1000 ML: 600; 310; 30; 20 INJECTION, SOLUTION INTRAVENOUS at 11:40

## 2022-10-25 RX ADMIN — FENTANYL CITRATE 25 MCG: 50 INJECTION, SOLUTION INTRAMUSCULAR; INTRAVENOUS at 16:02

## 2022-10-25 RX ADMIN — ROCURONIUM BROMIDE 20 MG: 10 INJECTION, SOLUTION INTRAVENOUS at 14:24

## 2022-10-25 RX ADMIN — SODIUM CHLORIDE, POTASSIUM CHLORIDE, SODIUM LACTATE AND CALCIUM CHLORIDE: 600; 310; 30; 20 INJECTION, SOLUTION INTRAVENOUS at 23:10

## 2022-10-25 RX ADMIN — SODIUM CHLORIDE, POTASSIUM CHLORIDE, SODIUM LACTATE AND CALCIUM CHLORIDE: 600; 310; 30; 20 INJECTION, SOLUTION INTRAVENOUS at 13:42

## 2022-10-25 RX ADMIN — Medication 2000 MG: at 14:24

## 2022-10-25 ASSESSMENT — PAIN SCALES - GENERAL
PAINLEVEL_OUTOF10: 5
PAINLEVEL_OUTOF10: 3
PAINLEVEL_OUTOF10: 3
PAINLEVEL_OUTOF10: 2

## 2022-10-25 ASSESSMENT — PAIN DESCRIPTION - DESCRIPTORS
DESCRIPTORS: ACHING
DESCRIPTORS: ACHING
DESCRIPTORS: CRAMPING

## 2022-10-25 ASSESSMENT — PAIN DESCRIPTION - LOCATION
LOCATION: ABDOMEN

## 2022-10-25 ASSESSMENT — PAIN - FUNCTIONAL ASSESSMENT
PAIN_FUNCTIONAL_ASSESSMENT: 0-10
PAIN_FUNCTIONAL_ASSESSMENT: ACTIVITIES ARE NOT PREVENTED

## 2022-10-25 ASSESSMENT — PAIN DESCRIPTION - PAIN TYPE: TYPE: SURGICAL PAIN

## 2022-10-25 NOTE — OP NOTE
Operative Note  Department of Obstetrics and Gynecology  [facility]       Patient: Sophia Pérez   : 1985  MRN: [id number]       Acct: [de-identified]   Date of Procedure: 10/25/22     Pre-operative Diagnosis: 40 y.o. female    Pelvic Pain  Dysmenorrhea  AUB  Hx C/S x 1  BMI 38    Post-operative Diagnosis: Same     Procedure: Laparoscopic Assisted Vaginal Hysterectomy, Bilateral Salpingectomy, Cystoscopy    Surgeon: Dr. Andrea Felder     Assistant(s): Dr. Azeb Hernandez, Clarisa Saunders, ; PGY 3    Anesthesia: general     Indications: Patient is a 40year old female with a history of pelvic pain, dysmenorrhea and AUB. Patient declines any further medical management and desires definitive surgical management. She had a TUVS that showed uterus measuring about 9 cm, otherwise unremarkable. Consent was signed and she received 2 grams of ancef pre operatively. Procedure Details: The patient was seen in the pre-op room. The risks, benefits, complications, treatment options, and expected outcomes were discussed with the patient. The patient concurred with the proposed plan, giving informed consent. The patient was taken to the Operating Room and identified as Sophia Pérez and the procedure was verified. A Time Out was held and the above information confirmed. After administration of general anesthesia, the patient was placed in the dorsolithotomy position with yellow-fin stirrups and examination under general anesthesia was performed with findings as noted below. The patient was prepped and draped in the usual sterile fashion. A weighted speculum was placed into the vagina to visualize the cervix. The cervix was grasped with a single-tooth tenaculum. A V-Care uterine manipulator was inserted into the cervical canal to aid in visualization during the laparoscopic portion of the case. V care was sutured to cervix with two 0 Vicryl. A dawson catheter was inserted into the bladder.     Attention was then turned to the abdominal portion of the case. Local was injected supra-umbilically. A 5 mm supra umbilical incision was made using a scalpel. The skin was grasped on either side of the incision and manually elevated with perforating towel clamps. A 5 mm port was then inserted under direct visualization utilizing opti-view technique through the port. CO2 gas was then used to create a pneumoperitoneum. The patient was then placed in trendelenberg position. Survey of the pelvis was then performed, revealing normal appearing uterus, bilateral tubes, and ovaries. Two 5 mm ports were then placed, one in the RLQ and the other in the LLQ under direct visualization with local being injected at time of port placement. At this time the left fallopian tube, and utero-ovarian ligament were cut and cauterized from the uterus using ligasure device. The left round ligament was then cut and cauterized using the ligasure device. The broad ligament was then elevated and coagulated and cut extending this over the anterior portion of the uterocervical junction to create the bladder flap. The left uterine vessels were then skeletonized and coagulated using ligasure device. Attention was then turned to the right side and the right fallopian tube, and utero-ovarian ligament were cut and cauterized from the uterus using ligasure device. The right round ligament was then cut and cauterized using the ligasure device. The broad ligament was then elevated and coagulated and cut extending this over the anterior portion of the uterocervical junction to create the bladder flap connecting it with the bladder flap from the left side. The right uterine vessels were then skeletonized and were coagulated with the ligasure instrument. The bladder was backfilled to ensure adequate bladder flap formation and to confirmed bladder anatomy prior to colpotomy. The bladder was noted to be well away from the surgical field.     Bovie hook was then used to create colpotomy starting posteriorly. Colpotomy was created layer by layer until V-Care was visualized. Real Gavia was then used to create colpotomy around manipulator cup. Uterus was then removed but kept in vagina to maintain pneumoperitoneum. Copious irrigation was then performed and cuff was noted to be largely hemostatic. Bilateral fallopian tubes were then cut and cauterized using ligasure device from the meso-salpinx. Ovarian pedicles were hemostatic. Bilateral fallopian tubes and uterus were then removed from the vagina. All laparoscopic instruments were then removed and pneumoperitoneum was evacuated. The vaginal cuff was then closed vaginally. Two right angle retractors were placed to visualize the cuff. Allis clamps were placed on the anterior and posterior cuff to aid in visualization. 0-vicryl was used on the left corner in interrupted fashion and then used for traction on the left corner. Same procedure was then performed on the right side of the cuff. Three more interrupted sutures of 0-vicryl were then placed to close the cuff. Additional figure of eight sutures were placed in the right and midline of the cuff to ensure adequate hemostasis. Cuff again appeared hemostatic and intact. A lap sponge was placed vaginally for packing for the remainder of the case. Cystoscopy was then performed. Bladder appeared intact with no evidence of injury. Bilateral ureteral jets with efflux were identified. The vaginal cuff was palpated and noted to be intact. Attention was then turned abdominally. Survey of the pelvis was again completed and no abnormalities noted. Mucosal bleed was noted on the left edge of the vaginal cuff. Ligasure device cauterization was attempted however slow ooze was still noted. The lap sponge was removed from the vagina and additional vaginal figure of eight sutures were placed in the left corner to achieve hemostasis.  Surgicel powder was placed laparoscopically over the cuff and bilateral adnexal pedicles. Laparoscopically the ligasure device was again utilized over the left cuff mucosal bleed to ensure hemostasis. Surgicel was irrigated and loose powder was suctioned from pelvis. Cystoscopy was repeated. Bladder appeared intact with no evidence of injury. Bilateral ureteral jets were identified. The vaginal cuff was palpated and noted to be intact. Hemostasis was again noted. All instruments were then removed. Pneumoperitoneum was evacuated. Skin was closed with 4-0 Monocryl interrupted sutures. Incisions were clean, dried and gauze with tegaderm was placed over incisions. Antibiotics were given prior to skin incision. SCDs remained in place for entire operative course and for recovery period. Patient was taken to recovery room in stable condition. Dr. Ladonna Gibson was present for the entire operation. Findings: Normal appearing external genitalia. Normal appearing vaginal mucosa. Normal appearing cervix with no lesions or discharge. Survey of pelvis reveals no abdominal adhesions, normal appearing uterus, normal appearing bilateral ovaries, normal appearing bilateral fallopian tube. Normal appearing liver edge. No endometriosis seen. On cystoscopy, there were no defects/suture seen in the bladder and bilateral ureters were noted to be peristalsising with efflux   Total IV fluids/Blood products:  1700 ml crystalloid  Urine Output:  unable to calculate   Estimated blood loss:  150ml  Drains:  none  Specimens:  uterus, cervix, bilateral fallopian tubes  Instrument and Sponge Count: Correct  Complications:  none  Condition:  stable, transferred to post anesthesia recovery    Jose Ramon Stephen DO  OB/GYN Resident PGY3  10/25/2022, 6:07 PM    Attending Attestation:   I was present and scrubbed for the entire procedure. Reviewed and made corrections to above operative note.      [address] [facility]  Date: 10/26/2022  Time: 8:58 PM

## 2022-10-25 NOTE — ANESTHESIA POSTPROCEDURE EVALUATION
Department of Anesthesiology  Postprocedure Note    Patient: Jd Tran  MRN: [id number]  YOB: 1985  Date of evaluation: 10/25/2022      Procedure Summary     Date: 10/25/22 Room / Location: [address]    Anesthesia Start: 3711 Anesthesia Stop: 1815    Procedure: TOTAL LAPAROSCOPIC ABDOMINAL HYSTERECTOMY, BILATERAL SALPINGECTOMY, DIAGNOSTIC CYSTOSCOPY Diagnosis:       Abnormal uterine bleeding      Pelvic pain      Dysmenorrhea      (ABNORMAL UTERINE BLEEDING, PELVIC PAIN, DYSMENORRHEA)    Surgeons: Analilia Patiño DO Responsible Provider: Lilian Moe MD    Anesthesia Type: general ASA Status: 2          Anesthesia Type: No value filed.     Justin Phase I: Justin Score: 9    Justin Phase II:        Anesthesia Post Evaluation    Patient location during evaluation: bedside  Patient participation: complete - patient participated  Level of consciousness: awake  Airway patency: patent  Nausea & Vomiting: no nausea and no vomiting  Complications: yes (suspected corneal abrasion)  Cardiovascular status: hemodynamically stable  Respiratory status: acceptable  Hydration status: stable  Comments: /69   Pulse 68   Temp 97 °F (36.1 °C)   Resp 17   Ht 5' 8\" (1.727 m)   Wt 252 lb (114.3 kg)   SpO2 99%   BMI 38.32 kg/m²

## 2022-10-25 NOTE — BRIEF OP NOTE
Brief Operative Note  Department of Obstetrics and Gynecology  [address] [facility]     Patient: Julio Cesar Queen   : 1985  MRN: [id number]       Acct: [de-identified]   Date of Procedure: 10/25/22     Pre-operative Diagnosis: 40 y.o. female    Pelvic Pain  Dysmenorrhea  AUB  Hx  x1   BMI 38     Post-operative Diagnosis: Same    Procedure: Laparoscopic Assisted Vaginal Hysterectomy, Bilateral Salpingectomy, Cystoscopy    Surgeon: Dr. Jammie Amaral): Dr. Josef Dover, Amelia Rich DO, PGY3    Anesthesia: general     Findings: Normal appearing external genitalia. Normal appearing vaginal mucosa. Normal appearing cervix with no lesions or discharge. Survey of pelvis reveals no abdominal adhesions, normal appearing uterus, normal appearing bilateral ovaries, normal appearing bilateral fallopian tube. On cystoscopy, there were no defects/suture seen in the bladder and bilateral ureters were noted to be peristalsising with efflux   Total IV fluids/Blood products:  1700 ml crystalloid  Urine Output:  unable to calculate   Estimated blood loss:  150ml  Drains:  none  Specimens:  uterus, cervix, bilateral fallopian tubes  Instrument and Sponge Count: Correct  Complications:  none  Condition:  stable, transferred to post anesthesia recovery    See full operative report for further details. Amelia Rich DO  Ob/Gyn Resident  10/25/2022, 6:07 PM    Attending Attestation:   I was present and scrubbed for the entire procedure.      [address] [facility]  Date: 10/25/2022  Time: 6:51 PM

## 2022-10-25 NOTE — PROGRESS NOTES
Gynecology Progress Note    Date: 10/26/2022  Time: 6:43 AM    Tonya Wall 40 y.o. female [id number], POD # 1 s/p ANURAG DEMARCO, Cysto on 10/25/22    Patient seen and examined. She complained of mild pain. Pain is controlled. Patient is  tolerating oral intake. She is urinating well. She denies any vaginal bleeding. She is  ambulating without difficulty. She is  passing flatus. She denies Fever/Chills, Chest Pain, SOB, N/V, Calf Pain    Vitals:  Vitals:    10/25/22 2000 10/25/22 2029 10/26/22 0030 10/26/22 0051   BP: 125/79 (!) 141/86 (!) 141/87    Pulse: 80 75 98    Resp: 16 18 18 18   Temp: 97 °F (36.1 °C)      TempSrc:       SpO2: 99% 100% 100%    Weight:       Height:             Intake/Output:   Last Shift: I/O last 3 completed shifts: In: 1000 [I.V.:1000]  Out: 150 [Blood:150]  Current Shift: I/O this shift:  In: -   Out: 2228 [Urine:2228]  2228 mL out in last 8 hrs ~ 278 mL/hr      Physical Exam:  General:  no apparent distress, alert, and cooperative  Neurologic:  alert, oriented, normal speech, no focal findings or movement disorder noted  Lungs:  No increased work of breathing, good air exchange, clear to auscultation bilaterally, no crackles or wheezing  Heart:  regular rate and rhythm and no murmur    Abdomen: soft, non-distended, appropriate tenderness, no CVA tenderness, normal bowel sounds  Incision: Dressings intact and not saturated  Extremities:  no calf tenderness, non edematous, SCDs functioning well    Lab:  No results found for this or any previous visit (from the past 12 hour(s)).       Assessment/Plan:  Lety Monte 40 y.o. female [id number], POD # 1 s/p ANURAG DEMARCO, Cysto on 10/25/22   - Doing well, vitals stable   - UOP adequate- 228 ml/hr overnight   - Initial PVR yesterday after patient's first void was 400 however she was able to empty her bladder shortly after, PVR overnight less than 200   - continue IVF   - Encourage ambulation and use of incentive spirometer   - Pain control: S/p toradol, Motrin/tylenol and ary ordered   - Labs: CBC and BMP this AM still pending   - DVT Proph: SCDs in place and functioning   - Abx: Received one dose of ancef pre operatively   - Diet: regular   - Path: pending   - IVF @ 125 ml/hr   - Continue post-op care, please page with any questions    Hx Elevated Creatinine   - BMP this Am still pending   - Creatinine was 1 on 8/29> 0.7 on 10/13   - Follow up outpatient    Anxiety/Depression   - Effexor daily   - Denies SI/HI   - Follow up outpatient    Hypothyroid   - Synthroid 75 daily   - Last TSH/T4 0.62/1.21 on 1/20   - Follow up outpatient    Pre-Diabetic   - Metformin 500 Daily   - Follow up outpatient    Hypertension   - No meds   - BP elevated   - Has blood pressure kit at home   - Follow up outpatient     E Streeter Florissant, DO  Ob/Gyn Resident  10/26/2022, 6:43 AM     Attending Physician Statement  I have personally seen, evaluated and discussed the care of [facility], including pertinent history and exam findings with the resident. I have reviewed and edited their note in the electronic medical record. The key elements of all parts of the encounter have been performed/reviewed by me. I agree with the assessment, plan and orders as documented by the resident. The level of care submitted represents to the best of my ability the care documented in the medical record today. GC Modifier. This service has been performed in part by a resident under the direction of a teaching physician. Patient seen and examined this morning. She is meeting all post op mile stones. She is ambulating, urinated several times, tolerating normal general diet, passed gas and already showered. Her pain is well controlled with current pain meds. Has some light vaginal spotting but denies passing any blood clots. Denies any chest pain, SOB, fevers or chills. Abdomen is soft, non tender, no guarding or rebound   3 port sites are covered with tegaderm- not saturated. Explained that we are repeating CBC at noon due to leukocytosis noted on AM labs. Likely reactive from surgery, has no signs of infection. Afebrile and pulse 71     Discussed complete pelvic rest for the next 8 weeks. Explained that she should not use any tobacco products as nicotine can impinge wound healing. She states she will try to cut back on dipping. Discussed no heavy lifting as well. Will schedule post op follow up in the office in the next 2-3 weeks.      Will follow repeat CBC and plan for discharge this PM      Attending's Name:  Katty Mondragon DO  Date: 10/26/2022  Time: 9:17 AM

## 2022-10-25 NOTE — ANESTHESIA PRE PROCEDURE
Department of Anesthesiology  Preprocedure Note       Name:  Francoise Wiley   Age:  40 y.o.  :  1985                                          MRN:  [id number]         Date:  10/25/2022      Surgeon: Tracie Ac):  Omega Rivers DO    Procedure: Procedure(s):  TOTAL LAPAROSCOPIC ABDOMINAL HYSTERECTOMY, BILATERAL SALPINGECTOMY, DIAGNOSTIC CYSTOSCOPY, POSSIBLE EXPLORATORY LAPAROTOMY, ANY OTHER INDICATED PROCEDURES  (DR TRUONG TO ASSIST)    Medications prior to admission:   Prior to Admission medications    Medication Sig Start Date End Date Taking? Authorizing Provider   venlafaxine (EFFEXOR XR) 75 MG extended release capsule TAKE ONE CAPSULE BY MOUTH WITH FOOD ONCE DAILY 10/14/22   Theopolis Hollisd, APRN - CNP   ferrous sulfate (IRON 325) 325 (65 Fe) MG tablet Take 1 tablet by mouth 2 times daily 10/14/22   Theopolis Curd, APRN - CNP   metFORMIN (GLUCOPHAGE) 500 MG tablet Take 1 tablet by mouth daily (with breakfast) 10/14/22   Theopolis Hollisd, APRN - CNP   levothyroxine (SYNTHROID) 75 MCG tablet Take 1 tablet by mouth Daily 10/14/22 10/14/23  Theopolis Curd, APRN - CNP   ibuprofen (ADVIL;MOTRIN) 800 MG tablet Take 1 tablet by mouth 2 times daily as needed for Pain 10/14/22   Theopolis Curd, APRN - CNP   norethindrone (AYGESTIN) 5 MG tablet Take 1 tablet by mouth daily 10/14/22   Theopolis Ava, APRN - CNP   Blood Pressure KIT 1 each by Does not apply route daily as needed (Hypertension) 10/14/22   Therosinais Ava APRN - CNP       Current medications:    Current Facility-Administered Medications   Medication Dose Route Frequency Provider Last Rate Last Admin    lactated ringers infusion 1,000 mL  1,000 mL IntraVENous Continuous Conchita Stoner MD        ceFAZolin (ANCEF) 2000 mg in sterile water 20 mL IV syringe  2,000 mg IntraVENous Once Omega Rivers DO           Allergies:     Allergies   Allergen Reactions    Shellfish-Derived Products Hives    Iodine        Problem List:    Patient Active Problem List Diagnosis Code    Abnormal facial hair L67.8    Family history of blood clots Z82.49    Pelvic pain R10.2    PCO (polycystic ovaries) E28.2    Endometrial thickening on ultrasound R93.89    Insulin resistance E88.81    Menorrhagia with regular cycle N92.0    Dysmenorrhea N94.6    H/O tubal ligation Z98.51    Abnormal uterine bleeding N93.9    Elevated blood pressure reading R03.0    Elevated serum creatinine R79.89    Depression F32. A       Past Medical History:        Diagnosis Date    COVID-19     not hospitalized . Symptoms 1 month. Headaches, no smell, and \"confusion\". Now back to normal    Depression     on rx per pcp    Elevated blood pressure reading     pt states at doctor's visits bp is high initially then decreases to 384 systolic    Endometriosis     Fibroid uterus 2018    PCOS (polycystic ovarian syndrome)     Snores     denies sleep apnea    Thyroid disease     Wears eyeglasses     Wellness examination     Dr. Baird Needs Primary physicians  taking over for Dr. Derrell Mota. Pt. to see for clearance but does not have an appointment yet. Past Surgical History:        Procedure Laterality Date     SECTION      TUBAL LIGATION      WISDOM TOOTH EXTRACTION         Social History:    Social History     Tobacco Use    Smoking status: Some Days     Packs/day: 0.25     Years: 0.50     Pack years: 0.13     Types: Cigarettes     Start date: 2022    Smokeless tobacco: Never    Tobacco comments:     Smokes \"socially. Not every day\" 1-2 cigarettes/week   Substance Use Topics    Alcohol use: Not Currently                                Ready to quit: Not Answered  Counseling given: Not Answered  Tobacco comments: Smokes \"socially. Not every day\" 1-2 cigarettes/week      Vital Signs (Current): There were no vitals filed for this visit.                                            BP Readings from Last 3 Encounters:   10/13/22 (!) 169/105   10/14/22 136/78   22 (!) 132/90       NPO Status:                                                                                 BMI:   Wt Readings from Last 3 Encounters:   10/13/22 254 lb (115.2 kg)   10/14/22 256 lb (116.1 kg)   09/22/22 253 lb 6.4 oz (114.9 kg)     There is no height or weight on file to calculate BMI.    CBC:   Lab Results   Component Value Date/Time    WBC 9.9 10/13/2022 11:04 AM    RBC 4.20 10/13/2022 11:04 AM    HGB 12.5 10/13/2022 11:04 AM    HCT 36.9 10/13/2022 11:04 AM    MCV 87.9 10/13/2022 11:04 AM    RDW 12.1 10/13/2022 11:04 AM     10/13/2022 11:04 AM       CMP:   Lab Results   Component Value Date/Time     10/13/2022 11:04 AM    K 4.2 10/13/2022 11:04 AM     10/13/2022 11:04 AM    CO2 27 10/13/2022 11:04 AM    BUN 10 10/13/2022 11:04 AM    CREATININE 0.74 10/13/2022 11:04 AM    GFRAA >60 08/29/2022 08:35 PM    LABGLOM >60 10/13/2022 11:04 AM    GLUCOSE 90 10/13/2022 11:04 AM    PROT 7.2 10/13/2022 11:04 AM    CALCIUM 9.3 10/13/2022 11:04 AM    BILITOT 0.5 10/13/2022 11:04 AM    ALKPHOS 58 10/13/2022 11:04 AM    AST 12 10/13/2022 11:04 AM    ALT 12 10/13/2022 11:04 AM       POC Tests: No results for input(s): POCGLU, POCNA, POCK, POCCL, POCBUN, POCHEMO, POCHCT in the last 72 hours.     Coags:   Lab Results   Component Value Date/Time    PROTIME 12.7 07/16/2021 11:45 AM    INR 0.9 07/16/2021 11:45 AM    APTT 32.7 07/16/2021 11:45 AM       HCG (If Applicable):   Lab Results   Component Value Date    PREGTESTUR NEGATIVE 10/13/2022    HCGQUANT <1 01/20/2022        ABGs: No results found for: PHART, PO2ART, APB5INZ, UUY0VKZ, BEART, I0VLCBCC     Type & Screen (If Applicable):  No results found for: LABABO, LABRH    Drug/Infectious Status (If Applicable):  No results found for: HIV, HEPCAB    COVID-19 Screening (If Applicable): No results found for: COVID19        Anesthesia Evaluation  Patient summary reviewed and Nursing notes reviewed no history of anesthetic complications:   Airway: Mallampati: III  TM distance: >3 FB   Neck ROM: full  Mouth opening: > = 3 FB   Dental: normal exam         Pulmonary:Negative Pulmonary ROS and normal exam                               Cardiovascular:Negative CV ROS                      Neuro/Psych:   (+) depression/anxiety             GI/Hepatic/Renal:             Endo/Other:    (+) hypothyroidism::., .                  ROS comment: PCOS Abdominal:             Vascular:   + DVT, . Other Findings:           Anesthesia Plan      general     ASA 2       Induction: intravenous. MIPS: Postoperative opioids intended and Prophylactic antiemetics administered. Anesthetic plan and risks discussed with patient. Plan discussed with CRNA.                     Jackson Cervantes MD   10/25/2022

## 2022-10-25 NOTE — DISCHARGE SUMMARY
Gyn Discharge Summary  [facility]      Patient Name: Elizabeth Zaragoza  Patient : 1985  Primary Care Physician: NELLY Doll CNP  Admit Date: 10/25/2022    Principal Diagnosis: Post Op LAVH, BS, Cystoscopy    Other Diagnosis:   Abnormal uterine bleeding [N93.9]  Pelvic pain [R10.2]  Dysmenorrhea [N94.6]  S/P hysterectomy [Z90.710]  Patient Active Problem List   Diagnosis    Abnormal facial hair    Family history of blood clots    Pelvic pain    PCO (polycystic ovaries)    Endometrial thickening on ultrasound    Insulin resistance    Menorrhagia with regular cycle    Dysmenorrhea    H/O tubal ligation    Abnormal uterine bleeding    Elevated blood pressure reading    Elevated serum creatinine    Depression    LAVH BS Cysto 10/25/22    S/P hysterectomy       Infection: No  Hospital Acquired: No    Surgical Operations & Procedures: LAVH, BS, Cysto    Consultations: none    Pertinent Findings & Procedures:   Elizabeth Zaragoza is a 40 y.o. female , admitted for scheduled TLH, BS, Cystoscopy; received ancef pre operatively. She underwent LAVH, BS, Cystoscopy on 10/25/22. Post-op course normal, discharged home. Follow up in 2 weeks. Discharge instructions reviewed and questions answered. Course of patient: normal  POD # 1: Labs unremarkable, Hg stable at 10.7, creatinine stable at 0.82  WBC count was 26 on morning labs, Repeat at noon stable at 23. Likely elevated secondary to sugery.     Discharge to: Home    Readmission planned: No    Recommendations on Discharge:     Medications:     Medication List        START taking these medications      acetaminophen 500 MG tablet  Commonly known as: TYLENOL  Take 2 tablets by mouth 3 times daily     ondansetron 4 MG tablet  Commonly known as: ZOFRAN  Take 1 tablet by mouth daily as needed for Nausea or Vomiting     oxyCODONE 5 MG immediate release tablet  Commonly known as: Roxicodone  Take 1 tablet by mouth every 6 hours as needed for Pain for up to 20 doses. Intended supply: 3 days. Take lowest dose possible to manage pain     simethicone 80 MG chewable tablet  Commonly known as: MYLICON  Take 1 tablet by mouth 4 times daily as needed for Flatulence            CHANGE how you take these medications      * ibuprofen 800 MG tablet  Commonly known as: ADVIL;MOTRIN  Take 1 tablet by mouth 2 times daily as needed for Pain  What changed: Another medication with the same name was added. Make sure you understand how and when to take each. * ibuprofen 800 MG tablet  Commonly known as: ADVIL;MOTRIN  Take 1 tablet by mouth every 8 hours as needed for Pain or Fever  What changed: You were already taking a medication with the same name, and this prescription was added. Make sure you understand how and when to take each. * This list has 2 medication(s) that are the same as other medications prescribed for you. Read the directions carefully, and ask your doctor or other care provider to review them with you.                 CONTINUE taking these medications      Blood Pressure Kit  1 each by Does not apply route daily as needed (Hypertension)     ferrous sulfate 325 (65 Fe) MG tablet  Commonly known as: IRON 325  Take 1 tablet by mouth 2 times daily     levothyroxine 75 MCG tablet  Commonly known as: SYNTHROID  Take 1 tablet by mouth Daily     metFORMIN 500 MG tablet  Commonly known as: GLUCOPHAGE  Take 1 tablet by mouth daily (with breakfast)     norethindrone 5 MG tablet  Commonly known as: Aygestin  Take 1 tablet by mouth daily     venlafaxine 75 MG extended release capsule  Commonly known as: EFFEXOR XR  TAKE ONE CAPSULE BY MOUTH WITH FOOD ONCE DAILY               Where to Get Your Medications        [facility] [address] [place] [address]      Phone: [phone]   acetaminophen 500 MG tablet  ibuprofen 800 MG tablet  ondansetron 4 MG tablet  oxyCODONE 5 MG immediate release tablet  simethicone 80 MG chewable tablet           Activity: pelvic rest x 8 weeks, no driving on narcotics, no lifting greater than 15 lbs  Diet: regular diet  Follow up: 2 weeks     Condition on discharge: good and stable   Discharge Date: 10/26/22    Comments:  Home care, Follow-up care, restrictions reviewed. Fuad Edmondson DO  Ob/Gyn Resident  [facility]  10/26/2022, 2:24 PM     Attending Physician Statement  I have personally seen, evaluated and discussed the care of [facility], including pertinent history and exam findings with the resident. I have reviewed and edited their note in the electronic medical record. The key elements of all parts of the encounter have been performed/reviewed by me. I agree with the assessment, plan and orders as documented by the resident. The level of care submitted represents to the best of my ability the care documented in the medical record today. GC Modifier. This service has been performed in part by a resident under the direction of a teaching physician.       Attending's Name:  Higinio Quach DO  Date: 10/26/2022  Time: 8:50 PM

## 2022-10-26 VITALS
TEMPERATURE: 97.9 F | SYSTOLIC BLOOD PRESSURE: 136 MMHG | HEART RATE: 78 BPM | HEIGHT: 68 IN | OXYGEN SATURATION: 99 % | RESPIRATION RATE: 16 BRPM | WEIGHT: 252 LBS | BODY MASS INDEX: 38.19 KG/M2 | DIASTOLIC BLOOD PRESSURE: 91 MMHG

## 2022-10-26 PROBLEM — G89.18 POST-OP PAIN: Status: ACTIVE | Noted: 2022-10-26

## 2022-10-26 LAB
ABSOLUTE EOS #: 0 K/UL (ref 0–0.4)
ABSOLUTE EOS #: <0.03 K/UL (ref 0–0.44)
ABSOLUTE IMMATURE GRANULOCYTE: 0 K/UL (ref 0–0.3)
ABSOLUTE IMMATURE GRANULOCYTE: 0.13 K/UL (ref 0–0.3)
ABSOLUTE LYMPH #: 2.1 K/UL (ref 1–4.8)
ABSOLUTE LYMPH #: 2.13 K/UL (ref 1.1–3.7)
ABSOLUTE MONO #: 0.79 K/UL (ref 0.1–0.8)
ABSOLUTE MONO #: 0.89 K/UL (ref 0.1–1.2)
ANION GAP SERPL CALCULATED.3IONS-SCNC: 8 MMOL/L (ref 9–17)
BASOPHILS # BLD: 0 % (ref 0–2)
BASOPHILS # BLD: 0 % (ref 0–2)
BASOPHILS ABSOLUTE: 0 K/UL (ref 0–0.2)
BASOPHILS ABSOLUTE: 0.03 K/UL (ref 0–0.2)
BUN BLDV-MCNC: 16 MG/DL (ref 6–20)
CALCIUM SERPL-MCNC: 8.7 MG/DL (ref 8.6–10.4)
CHLORIDE BLD-SCNC: 102 MMOL/L (ref 98–107)
CO2: 25 MMOL/L (ref 20–31)
CREAT SERPL-MCNC: 0.82 MG/DL (ref 0.5–0.9)
EOSINOPHILS RELATIVE PERCENT: 0 % (ref 1–4)
EOSINOPHILS RELATIVE PERCENT: 0 % (ref 1–4)
GFR SERPL CREATININE-BSD FRML MDRD: >60 ML/MIN/1.73M2
GLUCOSE BLD-MCNC: 136 MG/DL (ref 70–99)
HCT VFR BLD CALC: 30.7 % (ref 36.3–47.1)
HCT VFR BLD CALC: 32 % (ref 36.3–47.1)
HEMOGLOBIN: 10.2 G/DL (ref 11.9–15.1)
HEMOGLOBIN: 10.7 G/DL (ref 11.9–15.1)
IMMATURE GRANULOCYTES: 0 %
IMMATURE GRANULOCYTES: 1 %
LYMPHOCYTES # BLD: 8 % (ref 24–44)
LYMPHOCYTES # BLD: 9 % (ref 24–43)
MCH RBC QN AUTO: 28.6 PG (ref 25.2–33.5)
MCH RBC QN AUTO: 29.2 PG (ref 25.2–33.5)
MCHC RBC AUTO-ENTMCNC: 33.2 G/DL (ref 28.4–34.8)
MCHC RBC AUTO-ENTMCNC: 33.4 G/DL (ref 28.4–34.8)
MCV RBC AUTO: 85.6 FL (ref 82.6–102.9)
MCV RBC AUTO: 88 FL (ref 82.6–102.9)
MONOCYTES # BLD: 3 % (ref 1–7)
MONOCYTES # BLD: 4 % (ref 3–12)
MORPHOLOGY: NORMAL
NRBC AUTOMATED: 0 PER 100 WBC
NRBC AUTOMATED: 0 PER 100 WBC
PDW BLD-RTO: 12.6 % (ref 11.8–14.4)
PDW BLD-RTO: 12.7 % (ref 11.8–14.4)
PLATELET # BLD: 251 K/UL (ref 138–453)
PLATELET # BLD: 291 K/UL (ref 138–453)
PMV BLD AUTO: 10.1 FL (ref 8.1–13.5)
PMV BLD AUTO: 9.7 FL (ref 8.1–13.5)
POTASSIUM SERPL-SCNC: 4.3 MMOL/L (ref 3.7–5.3)
RBC # BLD: 3.49 M/UL (ref 3.95–5.11)
RBC # BLD: 3.74 M/UL (ref 3.95–5.11)
SEG NEUTROPHILS: 86 % (ref 36–65)
SEG NEUTROPHILS: 89 % (ref 36–66)
SEGMENTED NEUTROPHILS ABSOLUTE COUNT: 19.91 K/UL (ref 1.5–8.1)
SEGMENTED NEUTROPHILS ABSOLUTE COUNT: 23.31 K/UL (ref 1.8–7.7)
SODIUM BLD-SCNC: 135 MMOL/L (ref 135–144)
WBC # BLD: 23.1 K/UL (ref 3.5–11.3)
WBC # BLD: 26.2 K/UL (ref 3.5–11.3)

## 2022-10-26 PROCEDURE — 6370000000 HC RX 637 (ALT 250 FOR IP): Performed by: STUDENT IN AN ORGANIZED HEALTH CARE EDUCATION/TRAINING PROGRAM

## 2022-10-26 PROCEDURE — 94150 VITAL CAPACITY TEST: CPT

## 2022-10-26 PROCEDURE — 99024 POSTOP FOLLOW-UP VISIT: CPT | Performed by: STUDENT IN AN ORGANIZED HEALTH CARE EDUCATION/TRAINING PROGRAM

## 2022-10-26 PROCEDURE — 96374 THER/PROPH/DIAG INJ IV PUSH: CPT

## 2022-10-26 PROCEDURE — 6360000002 HC RX W HCPCS: Performed by: STUDENT IN AN ORGANIZED HEALTH CARE EDUCATION/TRAINING PROGRAM

## 2022-10-26 PROCEDURE — 94761 N-INVAS EAR/PLS OXIMETRY MLT: CPT

## 2022-10-26 PROCEDURE — 85025 COMPLETE CBC W/AUTO DIFF WBC: CPT

## 2022-10-26 PROCEDURE — 2580000003 HC RX 258: Performed by: STUDENT IN AN ORGANIZED HEALTH CARE EDUCATION/TRAINING PROGRAM

## 2022-10-26 PROCEDURE — 6370000000 HC RX 637 (ALT 250 FOR IP)

## 2022-10-26 PROCEDURE — G0378 HOSPITAL OBSERVATION PER HR: HCPCS

## 2022-10-26 PROCEDURE — 80048 BASIC METABOLIC PNL TOTAL CA: CPT

## 2022-10-26 PROCEDURE — 36415 COLL VENOUS BLD VENIPUNCTURE: CPT

## 2022-10-26 RX ORDER — TETRAHYDROZOLINE HCL 0.05 %
1 DROPS OPHTHALMIC (EYE) 3 TIMES DAILY
Status: DISCONTINUED | OUTPATIENT
Start: 2022-10-26 | End: 2022-10-26 | Stop reason: HOSPADM

## 2022-10-26 RX ADMIN — MINERAL OIL AND WHITE PETROLATUM: 30; 940 OINTMENT OPHTHALMIC at 01:55

## 2022-10-26 RX ADMIN — DOCUSATE SODIUM 100 MG: 100 CAPSULE ORAL at 08:17

## 2022-10-26 RX ADMIN — OXYCODONE 10 MG: 5 TABLET ORAL at 15:09

## 2022-10-26 RX ADMIN — OXYCODONE 5 MG: 5 TABLET ORAL at 00:21

## 2022-10-26 RX ADMIN — TETRAHYDROZOLINE HCL 1 DROP: 0.05 SOLUTION/ DROPS OPHTHALMIC at 08:18

## 2022-10-26 RX ADMIN — KETOROLAC TROMETHAMINE 1 DROP: 5 SOLUTION/ DROPS OPHTHALMIC at 15:09

## 2022-10-26 RX ADMIN — IBUPROFEN 600 MG: 600 TABLET, FILM COATED ORAL at 00:21

## 2022-10-26 RX ADMIN — ACETAMINOPHEN 1000 MG: 500 TABLET ORAL at 02:55

## 2022-10-26 RX ADMIN — ACETAMINOPHEN 1000 MG: 500 TABLET ORAL at 15:09

## 2022-10-26 RX ADMIN — KETOROLAC TROMETHAMINE 1 DROP: 5 SOLUTION/ DROPS OPHTHALMIC at 08:18

## 2022-10-26 RX ADMIN — ACETAMINOPHEN 1000 MG: 500 TABLET ORAL at 08:17

## 2022-10-26 RX ADMIN — SODIUM CHLORIDE, POTASSIUM CHLORIDE, SODIUM LACTATE AND CALCIUM CHLORIDE: 600; 310; 30; 20 INJECTION, SOLUTION INTRAVENOUS at 07:25

## 2022-10-26 RX ADMIN — MINERAL OIL AND WHITE PETROLATUM: 30; 940 OINTMENT OPHTHALMIC at 08:18

## 2022-10-26 RX ADMIN — KETOROLAC TROMETHAMINE 15 MG: 15 INJECTION, SOLUTION INTRAMUSCULAR; INTRAVENOUS at 02:53

## 2022-10-26 RX ADMIN — OXYCODONE 5 MG: 5 TABLET ORAL at 07:29

## 2022-10-26 RX ADMIN — VENLAFAXINE HYDROCHLORIDE 75 MG: 75 CAPSULE, EXTENDED RELEASE ORAL at 08:17

## 2022-10-26 RX ADMIN — LEVOTHYROXINE SODIUM 50 MCG: 50 TABLET ORAL at 08:17

## 2022-10-26 RX ADMIN — IBUPROFEN 600 MG: 600 TABLET, FILM COATED ORAL at 11:41

## 2022-10-26 RX ADMIN — KETOROLAC TROMETHAMINE 1 DROP: 5 SOLUTION/ DROPS OPHTHALMIC at 03:22

## 2022-10-26 ASSESSMENT — PAIN DESCRIPTION - LOCATION
LOCATION: ABDOMEN

## 2022-10-26 ASSESSMENT — PAIN DESCRIPTION - ORIENTATION: ORIENTATION: ANTERIOR

## 2022-10-26 ASSESSMENT — PAIN SCALES - GENERAL
PAINLEVEL_OUTOF10: 8
PAINLEVEL_OUTOF10: 4

## 2022-10-26 ASSESSMENT — PAIN - FUNCTIONAL ASSESSMENT: PAIN_FUNCTIONAL_ASSESSMENT: ACTIVITIES ARE NOT PREVENTED

## 2022-10-26 ASSESSMENT — PAIN DESCRIPTION - PAIN TYPE
TYPE: SURGICAL PAIN
TYPE: SURGICAL PAIN

## 2022-10-26 ASSESSMENT — PAIN DESCRIPTION - DESCRIPTORS: DESCRIPTORS: ACHING

## 2022-10-26 NOTE — PROGRESS NOTES
Ob/Gyn Progress Note    Morning labs reviewed. Patients Hg is stable at 10.2 and creatine is wnl at 8.2. Her WBC are elevated at 26. No s/s of infection and patient is afebrile with VSS. Will plan on repeat CBC at noon. Vitals:    10/25/22 2029 10/26/22 0030 10/26/22 0051 10/26/22 0730   BP: (!) 141/86 (!) 141/87  (!) 129/94   Pulse: 75 98  71   Resp: 18 18 18 16   Temp:    97.9 °F (36.6 °C)   TempSrc:    Oral   SpO2: 100% 100%  96%   Weight:       Height:         Attending updated and in agreement with plan.     Gregory Barr DO, PGY-3  Ob/Gyn Resident  [address]  10/26/22

## 2022-10-26 NOTE — PROGRESS NOTES
CLINICAL PHARMACY NOTE: MEDS TO BEDS    Total # of Prescriptions Filled: 4   The following medications were delivered to the patient:  Ondansetron  Simethicone  Oxycodone  acetaminophen    Additional Documentation:

## 2022-10-26 NOTE — PROGRESS NOTES
Ob/Gyn Progress Note    Repeat CBC showed WBC stable at 23, Hg remains stable at 10. Elevated WBC likely reactive secondary to surgery. Patient continues to meet all post op milestones and is stable for discharge home. She is going to follow up with her primary ob/gyn in 2-3 weeks. Attending updated and in agreement with plan.     Nia Murillo DO, PGY-3  Ob/Gyn Resident  [facility]  10/26/22

## 2022-10-26 NOTE — CARE COORDINATION
10/26/22 1053   Service Assessment   Patient Orientation Alert and Oriented;Person;Situation;Place   Cognition Alert   History Provided By Patient   Primary Caregiver Self   Support Systems Spouse/Significant Other  (significant other)   PCP Verified by CM Yes   Last Visit to PCP Within last 3 months   Prior Functional Level Independent in ADLs/IADLs   Current Functional Level Independent in ADLs/IADLs   Can patient return to prior living arrangement Yes   Ability to make needs known: Good   Family able to assist with home care needs: Yes   Would you like for me to discuss the discharge plan with any other family members/significant others, and if so, who? No   Financial Resources Medicaid; Medicare   Social/Functional History   Lives With Significant other   Type of [address] One level   [address] to enter without rails   Entrance Stairs - Number of Steps 3   Receives Help From Family;Friend(s)   ADL Assistance Independent   Homemaking Assistance Independent   Homemaking Responsibilities Yes   Ambulation Assistance Independent   Transfer Assistance Independent   Active  Yes   Mode of Transportation Car   Discharge Planning   Type of Residence House   Living Arrangements Spouse/Significant Other   Current Services Prior To Admission None   Potential Assistance Needed N/A   DME Ordered? No   Potential Assistance Purchasing Medications No   Type of Home Care Services None   Patient expects to be discharged to: House   One/Two Story Residence One story   History of falls? 0   Services At/After Discharge   [employer] Information Provided? No   Mode of Transport at Discharge Other (see comment)  (significant other)   Condition of Participation: Discharge Planning   The Plan for Transition of Care is related to the following treatment goals: comfort       Met with patient to discuss transitional planning. Plan is home independently with significant other. Patient has ride home.

## 2022-10-26 NOTE — PLAN OF CARE
Problem: Discharge Planning  Goal: Discharge to home or other facility with appropriate resources  10/26/2022 1510 by Sp Chong RN  Outcome: Adequate for Discharge  10/26/2022 7992 by Mikaela Boswell RN  Outcome: Progressing     Problem: Pain  Goal: Verbalizes/displays adequate comfort level or baseline comfort level  10/26/2022 1510 by Sp Chong RN  Outcome: Adequate for Discharge  Flowsheets (Taken 10/26/2022 0750)  Verbalizes/displays adequate comfort level or baseline comfort level:   Encourage patient to monitor pain and request assistance   Assess pain using appropriate pain scale   Administer analgesics based on type and severity of pain and evaluate response   Implement non-pharmacological measures as appropriate and evaluate response   Consider cultural and social influences on pain and pain management  10/26/2022 0638 by Mikaela Boswell RN  Outcome: Progressing     Problem: ABCDS Injury Assessment  Goal: Absence of physical injury  10/26/2022 1510 by Sp Chong RN  Outcome: Adequate for Discharge  10/26/2022 7658 by Mikaela Boswell RN  Outcome: Progressing

## 2022-10-27 LAB — SURGICAL PATHOLOGY REPORT: NORMAL

## 2022-10-31 ENCOUNTER — APPOINTMENT (OUTPATIENT)
Dept: CT IMAGING | Age: 37
End: 2022-10-31
Payer: MEDICAID

## 2022-10-31 ENCOUNTER — HOSPITAL ENCOUNTER (EMERGENCY)
Age: 37
Discharge: HOME | End: 2022-10-31
Attending: EMERGENCY MEDICINE
Payer: MEDICAID

## 2022-10-31 ENCOUNTER — APPOINTMENT (OUTPATIENT)
Dept: GENERAL RADIOLOGY | Age: 37
End: 2022-10-31
Payer: MEDICAID

## 2022-10-31 VITALS
OXYGEN SATURATION: 96 % | DIASTOLIC BLOOD PRESSURE: 89 MMHG | SYSTOLIC BLOOD PRESSURE: 132 MMHG | RESPIRATION RATE: 17 BRPM | TEMPERATURE: 98.7 F | HEART RATE: 99 BPM

## 2022-10-31 DIAGNOSIS — R07.89 OTHER CHEST PAIN: Primary | ICD-10-CM

## 2022-10-31 DIAGNOSIS — R06.02 SHORTNESS OF BREATH: ICD-10-CM

## 2022-10-31 PROBLEM — N93.9 ABNORMAL UTERINE BLEEDING: Status: RESOLVED | Noted: 2022-09-22 | Resolved: 2022-10-31

## 2022-10-31 PROBLEM — N94.6 DYSMENORRHEA: Status: RESOLVED | Noted: 2021-12-09 | Resolved: 2022-10-31

## 2022-10-31 PROBLEM — Z90.710 S/P LAPAROSCOPIC ASSISTED VAGINAL HYSTERECTOMY (LAVH): Chronic | Status: ACTIVE | Noted: 2022-10-25

## 2022-10-31 PROBLEM — R93.89 ENDOMETRIAL THICKENING ON ULTRASOUND: Status: RESOLVED | Noted: 2021-12-09 | Resolved: 2022-10-31

## 2022-10-31 PROBLEM — N92.0 MENORRHAGIA WITH REGULAR CYCLE: Status: RESOLVED | Noted: 2021-12-09 | Resolved: 2022-10-31

## 2022-10-31 LAB
ALBUMIN SERPL-MCNC: 4.2 G/DL (ref 3.5–5.2)
ALBUMIN/GLOBULIN RATIO: 1.2 (ref 1–2.5)
ALP BLD-CCNC: 68 U/L (ref 35–104)
ALT SERPL-CCNC: 13 U/L (ref 5–33)
ANION GAP SERPL CALCULATED.3IONS-SCNC: 13 MMOL/L (ref 9–17)
AST SERPL-CCNC: 14 U/L
BILIRUB SERPL-MCNC: 0.5 MG/DL (ref 0.3–1.2)
BUN BLDV-MCNC: 13 MG/DL (ref 6–20)
CALCIUM SERPL-MCNC: 9.2 MG/DL (ref 8.6–10.4)
CHLORIDE BLD-SCNC: 98 MMOL/L (ref 98–107)
CO2: 21 MMOL/L (ref 20–31)
CREAT SERPL-MCNC: 0.81 MG/DL (ref 0.5–0.9)
D-DIMER QUANTITATIVE: 1.06 MG/L FEU
FLU A ANTIGEN: NEGATIVE
FLU B ANTIGEN: NEGATIVE
GFR SERPL CREATININE-BSD FRML MDRD: >60 ML/MIN/1.73M2
GLUCOSE BLD-MCNC: 95 MG/DL (ref 70–99)
HCT VFR BLD CALC: 41.1 % (ref 36.3–47.1)
HEMOGLOBIN: 13.4 G/DL (ref 11.9–15.1)
LIPASE: 16 U/L (ref 13–60)
MAGNESIUM: 2 MG/DL (ref 1.6–2.6)
MCH RBC QN AUTO: 29 PG (ref 25.2–33.5)
MCHC RBC AUTO-ENTMCNC: 32.6 G/DL (ref 28.4–34.8)
MCV RBC AUTO: 89 FL (ref 82.6–102.9)
NRBC AUTOMATED: 0 PER 100 WBC
PDW BLD-RTO: 12.7 % (ref 11.8–14.4)
PLATELET # BLD: 344 K/UL (ref 138–453)
PMV BLD AUTO: 9.8 FL (ref 8.1–13.5)
POTASSIUM SERPL-SCNC: 4.3 MMOL/L (ref 3.7–5.3)
RBC # BLD: 4.62 M/UL (ref 3.95–5.11)
SODIUM BLD-SCNC: 132 MMOL/L (ref 135–144)
TOTAL PROTEIN: 7.8 G/DL (ref 6.4–8.3)
TROPONIN, HIGH SENSITIVITY: <6 NG/L (ref 0–14)
TROPONIN, HIGH SENSITIVITY: <6 NG/L (ref 0–14)
WBC # BLD: 13.4 K/UL (ref 3.5–11.3)

## 2022-10-31 PROCEDURE — 6360000002 HC RX W HCPCS: Performed by: STUDENT IN AN ORGANIZED HEALTH CARE EDUCATION/TRAINING PROGRAM

## 2022-10-31 PROCEDURE — 96374 THER/PROPH/DIAG INJ IV PUSH: CPT

## 2022-10-31 PROCEDURE — 2580000003 HC RX 258: Performed by: STUDENT IN AN ORGANIZED HEALTH CARE EDUCATION/TRAINING PROGRAM

## 2022-10-31 PROCEDURE — 71260 CT THORAX DX C+: CPT | Performed by: STUDENT IN AN ORGANIZED HEALTH CARE EDUCATION/TRAINING PROGRAM

## 2022-10-31 PROCEDURE — 99285 EMERGENCY DEPT VISIT HI MDM: CPT

## 2022-10-31 PROCEDURE — 71045 X-RAY EXAM CHEST 1 VIEW: CPT

## 2022-10-31 PROCEDURE — 96375 TX/PRO/DX INJ NEW DRUG ADDON: CPT

## 2022-10-31 PROCEDURE — 6360000004 HC RX CONTRAST MEDICATION: Performed by: STUDENT IN AN ORGANIZED HEALTH CARE EDUCATION/TRAINING PROGRAM

## 2022-10-31 PROCEDURE — 83690 ASSAY OF LIPASE: CPT

## 2022-10-31 PROCEDURE — 84484 ASSAY OF TROPONIN QUANT: CPT

## 2022-10-31 PROCEDURE — 85027 COMPLETE CBC AUTOMATED: CPT

## 2022-10-31 PROCEDURE — 87804 INFLUENZA ASSAY W/OPTIC: CPT

## 2022-10-31 PROCEDURE — 80053 COMPREHEN METABOLIC PANEL: CPT

## 2022-10-31 PROCEDURE — 93005 ELECTROCARDIOGRAM TRACING: CPT | Performed by: STUDENT IN AN ORGANIZED HEALTH CARE EDUCATION/TRAINING PROGRAM

## 2022-10-31 PROCEDURE — 6370000000 HC RX 637 (ALT 250 FOR IP): Performed by: STUDENT IN AN ORGANIZED HEALTH CARE EDUCATION/TRAINING PROGRAM

## 2022-10-31 PROCEDURE — 83735 ASSAY OF MAGNESIUM: CPT

## 2022-10-31 PROCEDURE — 85379 FIBRIN DEGRADATION QUANT: CPT

## 2022-10-31 PROCEDURE — 96376 TX/PRO/DX INJ SAME DRUG ADON: CPT

## 2022-10-31 RX ORDER — ALBUTEROL SULFATE 90 UG/1
2 AEROSOL, METERED RESPIRATORY (INHALATION) 4 TIMES DAILY PRN
Qty: 54 G | Refills: 1 | Status: SHIPPED | OUTPATIENT
Start: 2022-10-31

## 2022-10-31 RX ORDER — DIPHENHYDRAMINE HYDROCHLORIDE 50 MG/ML
50 INJECTION INTRAMUSCULAR; INTRAVENOUS ONCE
Status: COMPLETED | OUTPATIENT
Start: 2022-10-31 | End: 2022-10-31

## 2022-10-31 RX ORDER — FENTANYL CITRATE 50 UG/ML
50 INJECTION, SOLUTION INTRAMUSCULAR; INTRAVENOUS ONCE
Status: COMPLETED | OUTPATIENT
Start: 2022-10-31 | End: 2022-10-31

## 2022-10-31 RX ORDER — FLUTICASONE PROPIONATE 50 MCG
1 SPRAY, SUSPENSION (ML) NASAL DAILY
Status: DISCONTINUED | OUTPATIENT
Start: 2022-10-31 | End: 2022-11-01 | Stop reason: HOSPADM

## 2022-10-31 RX ORDER — METHYLPREDNISOLONE SODIUM SUCCINATE 125 MG/2ML
40 INJECTION, POWDER, LYOPHILIZED, FOR SOLUTION INTRAMUSCULAR; INTRAVENOUS ONCE
Status: COMPLETED | OUTPATIENT
Start: 2022-10-31 | End: 2022-10-31

## 2022-10-31 RX ORDER — IBUPROFEN 800 MG/1
800 TABLET ORAL EVERY 8 HOURS PRN
Qty: 21 TABLET | Refills: 0 | Status: SHIPPED | OUTPATIENT
Start: 2022-10-31

## 2022-10-31 RX ORDER — 0.9 % SODIUM CHLORIDE 0.9 %
1000 INTRAVENOUS SOLUTION INTRAVENOUS ONCE
Status: COMPLETED | OUTPATIENT
Start: 2022-10-31 | End: 2022-10-31

## 2022-10-31 RX ADMIN — FLUTICASONE PROPIONATE 1 SPRAY: 50 SPRAY, METERED NASAL at 14:56

## 2022-10-31 RX ADMIN — METHYLPREDNISOLONE SODIUM SUCCINATE 40 MG: 125 INJECTION, POWDER, FOR SOLUTION INTRAMUSCULAR; INTRAVENOUS at 20:33

## 2022-10-31 RX ADMIN — DIPHENHYDRAMINE HYDROCHLORIDE 50 MG: 50 INJECTION, SOLUTION INTRAMUSCULAR; INTRAVENOUS at 18:05

## 2022-10-31 RX ADMIN — IOPAMIDOL 75 ML: 755 INJECTION, SOLUTION INTRAVENOUS at 21:15

## 2022-10-31 RX ADMIN — SODIUM CHLORIDE 1000 ML: 9 INJECTION, SOLUTION INTRAVENOUS at 13:57

## 2022-10-31 RX ADMIN — FENTANYL CITRATE 50 MCG: 50 INJECTION INTRAMUSCULAR; INTRAVENOUS at 13:57

## 2022-10-31 RX ADMIN — METHYLPREDNISOLONE SODIUM SUCCINATE 40 MG: 125 INJECTION, POWDER, FOR SOLUTION INTRAMUSCULAR; INTRAVENOUS at 19:09

## 2022-10-31 RX ADMIN — FENTANYL CITRATE 50 MCG: 50 INJECTION INTRAMUSCULAR; INTRAVENOUS at 18:11

## 2022-10-31 RX ADMIN — METHYLPREDNISOLONE SODIUM SUCCINATE 40 MG: 125 INJECTION, POWDER, FOR SOLUTION INTRAMUSCULAR; INTRAVENOUS at 14:55

## 2022-10-31 ASSESSMENT — HEART SCORE: ECG: 0

## 2022-10-31 ASSESSMENT — ENCOUNTER SYMPTOMS
SINUS PAIN: 0
BACK PAIN: 0
DIARRHEA: 0
SHORTNESS OF BREATH: 1
SORE THROAT: 0
EYE PAIN: 0
NAUSEA: 0
COUGH: 0
ABDOMINAL PAIN: 1
RHINORRHEA: 1
VOMITING: 0

## 2022-10-31 ASSESSMENT — PAIN SCALES - GENERAL
PAINLEVEL_OUTOF10: 10
PAINLEVEL_OUTOF10: 5

## 2022-10-31 NOTE — ED PROVIDER NOTES
[facility]     Emergency Department     Faculty Attestation    I performed a history and physical examination of the patient and discussed management with the resident. I have reviewed and agree with the residents findings including all diagnostic interpretations, and treatment plans as written at the time of my review. Any areas of disagreement are noted on the chart. I was personally present for the key portions of any procedures. I have documented in the chart those procedures where I was not present during the key portions. For Physician Assistant/ Nurse Practitioner cases/documentation I have personally evaluated this patient and have completed at least one if not all key elements of the E/M (history, physical exam, and MDM). Additional findings are as noted. Primary Care Physician: NELLY Rowell - CNP    History: This is a 40 y.o. female who presents to the Emergency Department with complaint of chest pain and shortness of breath coughing. Patient has an occasional productive sputum. She denies any chills but has some subjective fevers at home. Patient did have recent surgery for hysterectomy. Physical:   oral temperature is 98.7 °F (37.1 °C). Her blood pressure is 128/84 and her pulse is 99. Her respiration is 17 and oxygen saturation is 95%. Lungs clear to auscultation, heart tachycardic with a regular rhythm, abdomen is soft patient has ecchymosis on the abdomen where it appears to have Lovenox injections given during her recent hospitalization stay.     Impression: Cough chest pain shortness of breath    Plan: Chest x-ray, EKG, CBC, BMP, troponin, CT scan      EKG Interpretation    Sinus tachycardia ventricular 120, normal OR interval, normal QRS duration, normal axis, normal QT corrected  Compared EKG of October 13, 2022, ventricular rate has increased by 42    Based on my initial history, physical exam and review of the past

## 2022-10-31 NOTE — ED TRIAGE NOTES
Called back to ED coordinator at [phone] that pt was lethargic, response \"I will work on a room\"

## 2022-10-31 NOTE — ED TRIAGE NOTES
Pt arrived to ED 16 via triage. Pt co CP. Pt states that it has been going on for 2 days. Pt states that it is mid sternal and non radiating. Pt had a hysterectomy and then exploratory surgery last week. Pt is resting on stretcher with call light within reach. Breathing is non labored and no acute distress is noted.    Will continue to follow plan of care

## 2022-10-31 NOTE — ED PROVIDER NOTES
[address] HANDOFF       Handoff taken on the following patient from prior Attending Physician:  Pt Name: Jono Obando  PCP:  NELLY Jaeger CNP    Attestation  I was available and discussed any additional care issues that arose and coordinated the management plans with the resident(s) caring for the patient during my duty period. Any areas of disagreement with resident's documentation of care or procedures are noted on the chart. I was personally present for the key portions of any/all procedures during my duty period. I have documented in the chart those procedures where I was not present during the key portions.            Leigh Preston MD  10/31/22 0150

## 2022-10-31 NOTE — CONSULTS
[address] [facility]    Patient Name: Bob Mon     Patient : 1985  Room/Bed: 16[facility]  Admission Date/Time: 10/31/2022  1:24 PM  Primary Care Physician: NELLY Yu CNP    Consulting Provider: Dr. Shane Mclaughlin  Reason for Consult: Post Operative Pain     CC:   Chief Complaint   Patient presents with    Chest Pain                HPI: Bob Mon is a 40 y.o. female [id number] presents for post-operative chest pain and shortness of breath. Patient is POD#6 from a LAVH with Dr. Chai Gusman on 10/25/22. Patient states she woke up this morning with chest pain, shortness of breath, and productive cough. She states she has not attempted anything for pain. Patient is not complaining of abdominal pain, nausea, vomiting, constipation, fevers, chills. REVIEW OF SYSTEMS:   A minimum of an eleven point review of systems was completed.     Constitutional: negative fever, negative chills  HEENT: negative visual disturbances, negative headaches  Respiratory: + dyspnea, negative cough  Cardiovascular: + chest pain,  negative palpitations  Gastrointestinal: negative abdominal pain, negative RUQ pain, negative N/V, negative diarrhea, negative constipation  Genitourinary: negative dysuria, negative vaginal discharge, negative vaginal bleeding  Dermatological: negative rash, negative wounds  Hematologic: negative bleeding/clotting disorder  Immunologic: negative recent illness, negative recent sick contact, negative allergic reactions  Lymphatic: negative lymph nodes  Musculoskeletal: negative back pain, negative myalgias, negative arthralgias  Neurological:  negative dizziness, negative weakness  Behavior/Psych: negative depression, negative anxiety  _______________________________________________________________________  OBSTETRIC HISTORY:   OB History    Para Term  AB Living   3 3 3 0 0 3   SAB IAB Ectopic Molar Multiple Live Births   0 0 0 0 0 3      # Outcome Date GA Lbr Eldon/2nd Weight Sex Delivery Anes PTL Lv   3 Term 2018    F CS-LTranv   SILVINO   2 Term 2016 [id number]   F Vag-Spont   SILVINO   1 Term 2006    F Vag-Spont   SILVINO       PAST MEDICAL HISTORY:   has a past medical history of COVID-19, Depression, Elevated blood pressure reading, Endometriosis, Fibroid uterus, PCOS (polycystic ovarian syndrome), Snores, Thyroid disease, Wears eyeglasses, and Wellness examination. PAST SURGICAL HISTORY:   has a past surgical history that includes  section; Tubal ligation; [place] tooth extraction (); Laparoscopic hysterectomy (10/25/2022); and Hysterectomy (N/A, 10/25/2022). ALLERGIES:  Allergies as of 10/31/2022 - Fully Reviewed 10/31/2022   Allergen Reaction Noted    Shellfish-derived products Hives 2021    Iodine  2021       MEDICATIONS:  Current Facility-Administered Medications   Medication Dose Route Frequency Provider Last Rate Last Admin    0.9 % sodium chloride bolus  1,000 mL IntraVENous Once Heriberto Christy MD 1,000 mL/hr at 10/31/22 1357 1,000 mL at 10/31/22 1357    fluticasone (FLONASE) 50 MCG/ACT nasal spray 1 spray  1 spray Each Nostril Daily Heriberto Christy MD        diphenhydrAMINE (BENADRYL) injection 50 mg  50 mg IntraVENous Once Heriberto Christy MD        methylPREDNISolone sodium (SOLU-MEDROL) injection 40 mg  40 mg IntraVENous Once Heriberto Christy MD        methylPREDNISolone sodium (SOLU-MEDROL) injection 40 mg  40 mg IntraVENous Once Heriberto Christy MD         Current Outpatient Medications   Medication Sig Dispense Refill    ibuprofen (ADVIL;MOTRIN) 800 MG tablet Take 1 tablet by mouth every 8 hours as needed for Pain or Fever 60 tablet 0    acetaminophen (TYLENOL) 500 MG tablet Take 2 tablets by mouth 3 times daily 540 tablet 1    oxyCODONE (ROXICODONE) 5 MG immediate release tablet Take 1 tablet by mouth every 6 hours as needed for Pain for up to 20 doses. Intended supply: 3 days.  Take lowest dose possible to manage pain 20 tablet 0 simethicone (MYLICON) 80 MG chewable tablet Take 1 tablet by mouth 4 times daily as needed for Flatulence 180 tablet 3    ondansetron (ZOFRAN) 4 MG tablet Take 1 tablet by mouth daily as needed for Nausea or Vomiting 30 tablet 0    venlafaxine (EFFEXOR XR) 75 MG extended release capsule TAKE ONE CAPSULE BY MOUTH WITH FOOD ONCE DAILY 30 capsule 5    ferrous sulfate (IRON 325) 325 (65 Fe) MG tablet Take 1 tablet by mouth 2 times daily 60 tablet 3    metFORMIN (GLUCOPHAGE) 500 MG tablet Take 1 tablet by mouth daily (with breakfast) 90 tablet 5    levothyroxine (SYNTHROID) 75 MCG tablet Take 1 tablet by mouth Daily 90 tablet 1    ibuprofen (ADVIL;MOTRIN) 800 MG tablet Take 1 tablet by mouth 2 times daily as needed for Pain 60 tablet 0    norethindrone (AYGESTIN) 5 MG tablet Take 1 tablet by mouth daily 30 tablet 3    Blood Pressure KIT 1 each by Does not apply route daily as needed (Hypertension) 1 kit 0       FAMILY HISTORY:  family history includes Cervical Cancer in her paternal aunt; Hypertension in her brother and mother; Other in her father; Ovarian Cancer in her maternal aunt; Thyroid Disease in her maternal aunt, maternal grandmother, and mother. SOCIAL HISTORY:   reports that she has been smoking cigarettes. She started smoking about 7 months ago. She has a 0.13 pack-year smoking history. She has never used smokeless tobacco. She reports that she does not currently use alcohol. She reports that she does not use drugs. ________________________________________________________________________                                    Jez Estrada:  Vitals:    10/31/22 1330 10/31/22 1331 10/31/22 1332   BP: (!) 138/101 (!) 138/101    Pulse:  (!) 112    Resp:  18    Temp:   98.7 °F (37.1 °C)   TempSrc:   Oral   SpO2: 100% 100%                                                     INPUT/OUTPUT:  No intake/output data recorded. No intake/output data recorded. PHYSICAL EXAM:     General Appearance: Appears healthy. Alert; in no acute distress. Pleasant. Skin: Skin color, texture, turgor normal. No rashes or lesions. Lymphatic: No abnormally enlarged lymph nodes. Neck and EENT: normal atraumatic, no neck masses, normal thyroid, no jvd  Respiratory: Normal expansion. Difficult to auscultate secondary to poor respiratory effort.   Cardiovascular: regular rate and rhythm  Abdomen: soft, non-tender, non-distended, no right upper quadrant tenderness, and no CVA tenderness, no rebound, guarding, or rigidity, laparoscopic port sites present with minimal bruising  Pelvic Exam: not indicated   Rectal Exam: exam declined by patient  Musculoskeletal: no gross abnormalities  Extremities: non-tender BLE and non-edematous  Psych:  oriented to time, place and person     LAB RESULTS:  Results for orders placed or performed during the hospital encounter of 10/31/22   RAPID INFLUENZA A/B ANTIGENS    Specimen: Nasopharyngeal   Result Value Ref Range    Flu A Antigen NEGATIVE NEGATIVE    Flu B Antigen NEGATIVE NEGATIVE   CBC   Result Value Ref Range    WBC 13.4 (H) 3.5 - 11.3 k/uL    RBC 4.62 3.95 - 5.11 m/uL    Hemoglobin 13.4 11.9 - 15.1 g/dL    Hematocrit 41.1 36.3 - 47.1 %    MCV 89.0 82.6 - 102.9 fL    MCH 29.0 25.2 - 33.5 pg    MCHC 32.6 28.4 - 34.8 g/dL    RDW 12.7 11.8 - 14.4 %    Platelets 808 915 - 722 k/uL    MPV 9.8 8.1 - 13.5 fL    NRBC Automated 0.0 0.0 per 100 WBC   Comprehensive Metabolic Panel   Result Value Ref Range    Glucose 95 70 - 99 mg/dL    BUN 13 6 - 20 mg/dL    Creatinine 0.81 0.50 - 0.90 mg/dL    Est, Glom Filt Rate >60 >60 mL/min/1.73m2    Calcium 9.2 8.6 - 10.4 mg/dL    Sodium 132 (L) 135 - 144 mmol/L    Potassium 4.3 3.7 - 5.3 mmol/L    Chloride 98 98 - 107 mmol/L    CO2 21 20 - 31 mmol/L    Anion Gap 13 9 - 17 mmol/L    Alkaline Phosphatase 68 35 - 104 U/L    ALT 13 5 - 33 U/L    AST 14 <32 U/L Total Bilirubin 0.5 0.3 - 1.2 mg/dL    Total Protein 7.8 6.4 - 8.3 g/dL    Albumin 4.2 3.5 - 5.2 g/dL    Albumin/Globulin Ratio 1.2 1.0 - 2.5   Troponin   Result Value Ref Range    Troponin, High Sensitivity <6 0 - 14 ng/L   Lipase   Result Value Ref Range    Lipase 16 13 - 60 U/L   Magnesium   Result Value Ref Range    Magnesium 2.0 1.6 - 2.6 mg/dL   D-Dimer, Quantitative   Result Value Ref Range    D-Dimer, Quant 1.06 mg/L FEU         DIAGNOSTICS:    XR CHEST PORTABLE    Result Date: 10/31/2022  EXAMINATION: ONE XRAY VIEW OF THE CHEST 10/31/2022 1:34 pm COMPARISON: None. HISTORY: ORDERING SYSTEM PROVIDED HISTORY: Chest Pain TECHNOLOGIST PROVIDED HISTORY: Chest Pain FINDINGS: The patient is somewhat rotated and there appears to be a large body habitus. The heart is normal in size. Central pulmonary vascularity appears normal. No evidence of pneumothorax or pleural effusion. No evidence of focal infiltrate. Unremarkable portable frontal view of the chest.  No evidence of acute cardiopulmonary process.        ASSESSMENT & PLAN:    Jayne Calderon is a 40 y.o. female [id number] POD#6 s/p KRISTINAH, BS with Chest Pain/SOB    - Patient reports chest pain/SOB since this AM    - Patient denies any abdominal complaints, fevers/chills, vaginal bleeding    - VSS, Afebrile    - Troponin neg, EKG pending    - D-Dimer elevated   - CXR unremarkable    - CT Chest pending    - Symptoms possibly secondary to post-operative PE vs Pneumonia vs Atelectasis    - No concern for acute abdomen    - No additional intervention required at this time   - If patient meets criteria for inpatient hospitalization secondary to post operative complication Ob/Gyn will continue to follow patient     Patient Active Problem List    Diagnosis Date Noted    Post-op pain 10/26/2022     Priority: Medium    LAVH BS Cysto 10/25/22 10/25/2022     Priority: Medium    S/P hysterectomy 10/25/2022     Priority: Medium    Depression 10/14/2022     Priority: Medium Abnormal uterine bleeding 09/22/2022     Priority: Medium    Elevated blood pressure reading 09/22/2022     Priority: Medium    Elevated serum creatinine 09/22/2022     Priority: Medium    Pelvic pain 12/09/2021    PCO (polycystic ovaries) 12/09/2021     RX Metformin 750 mg bid      Endometrial thickening on ultrasound 12/09/2021     Plan: Emb hscope in office 1/2022      Insulin resistance 12/09/2021    Menorrhagia with regular cycle 12/09/2021    Dysmenorrhea 12/09/2021    H/O tubal ligation 12/09/2021    Abnormal facial hair 07/16/2021    Family history of blood clots 07/16/2021       Plan discussed with Dr. Femi Pace, who is agreeable.      Attending's Name: Dr. Kayy Little DO  Ob/Gyn Resident  PGY1  [address]  10/31/2022, 2:34 PM

## 2022-10-31 NOTE — ED PROVIDER NOTES
[facility]  ED  Emergency Department Encounter  EmergencyMedicineResident       Pt Name: Dane Jeffries  MRN: [id number]  Armsinderjitgfrupert 1985  Date of evaluation: 10/31/22  PCP: NELLY Kohler CNP    CHIEF COMPLAINT       Chief Complaint   Patient presents with    Chest Pain       HISTORY OF PRESENT ILLNESS  (Location/Symptom, Timing/Onset, Context/Setting, Quality, Duration, Modifying Factors, Severity.)      Dane Jeffries is a 40 y.o. female who presents evaluation today for shortness of breath, abdominal pain and chest pain. Recent hysterectomy 5 days prior with OB. She reports that she also has a viral URI. Nothing at home has seemed to relieve her symptoms. She is concerned that it could be postsurgical in nature. She denies any  complaints. She has felt feverish. No significant past medical history for blood clots. She is not currently on anticoagulation. Symptoms seem to be getting worse. PAST MEDICAL / SURGICAL /SOCIAL / FAMILY HISTORY      has a past medical history of COVID-19, Depression, Elevated blood pressure reading, Endometriosis, Fibroid uterus, PCOS (polycystic ovarian syndrome), Snores, Thyroid disease, Wears eyeglasses, and Wellness examination. has a past surgical history that includes  section; Tubal ligation; [place] tooth extraction (); Laparoscopic hysterectomy (10/25/2022); and Hysterectomy (N/A, 10/25/2022). Social History     Socioeconomic History    Marital status: Single     Spouse name: Not on file    Number of children: Not on file    Years of education: Not on file    Highest education level: Not on file   Occupational History    Not on file   Tobacco Use    Smoking status: Some Days     Packs/day: 0.25     Years: 0.50     Pack years: 0.13     Types: Cigarettes     Start date: 2022    Smokeless tobacco: Never    Tobacco comments:     Smokes \"socially.  Not every day\" 1-2 cigarettes/week   Vaping Use    Vaping Use: Never used Substance and Sexual Activity    Alcohol use: Not Currently    Drug use: Never    Sexual activity: Not Currently     Partners: Male     Comment: tubal   Other Topics Concern    Not on file   Social History Narrative    Not on file     Social Determinants of Health     Financial Resource Strain: High Risk    Difficulty of Paying Living Expenses: Hard   Food Insecurity: Food Insecurity Present    Worried About [employer] in the Last Year: Sometimes true    Ran Out of Food in the Last Year: Sometimes true   Transportation Needs: No Transportation Needs    Lack of Transportation (Medical): No    Lack of Transportation (Non-Medical): No   Physical Activity: Not on file   Stress: Not on file   Social Connections: Not on file   Intimate Partner Violence: Not on file   Housing Stability: Not on file       Family History   Problem Relation Age of Onset    Thyroid Disease Mother     Hypertension Mother     Other Father     Thyroid Disease Maternal Grandmother     Hypertension Brother     Thyroid Disease Maternal Aunt     Ovarian Cancer Maternal Aunt     Cervical Cancer Paternal Aunt        Allergies:  Shellfish-derived products and Iodine    Home Medications:  Prior to Admission medications    Medication Sig Start Date End Date Taking? Authorizing Provider   ibuprofen (ADVIL;MOTRIN) 800 MG tablet Take 1 tablet by mouth every 8 hours as needed for Pain or Fever 10/25/22   Ursula Lundy DO   acetaminophen (TYLENOL) 500 MG tablet Take 2 tablets by mouth 3 times daily 10/25/22   Ursula Lundy DO   oxyCODONE (ROXICODONE) 5 MG immediate release tablet Take 1 tablet by mouth every 6 hours as needed for Pain for up to 20 doses. Intended supply: 3 days.  Take lowest dose possible to manage pain 10/25/22 11/1/22  Ursula Lundy DO   simethicone (MYLICON) 80 MG chewable tablet Take 1 tablet by mouth 4 times daily as needed for Flatulence 10/25/22   Ursula Lundy DO   ondansetron (ZOFRAN) 4 MG tablet Take 1 tablet by mouth daily as needed for Nausea or Vomiting 10/25/22   Jonathan Melissa,    venlafaxine (EFFEXOR XR) 75 MG extended release capsule TAKE ONE CAPSULE BY MOUTH WITH FOOD ONCE DAILY 10/14/22   NELLY Dailey CNP   ferrous sulfate (IRON 325) 325 (65 Fe) MG tablet Take 1 tablet by mouth 2 times daily 10/14/22   NELLY Dailey - CNP   metFORMIN (GLUCOPHAGE) 500 MG tablet Take 1 tablet by mouth daily (with breakfast) 10/14/22   NELLY Dailey - CNP   levothyroxine (SYNTHROID) 75 MCG tablet Take 1 tablet by mouth Daily 10/14/22 10/14/23  NELLY Dailey - CNP   ibuprofen (ADVIL;MOTRIN) 800 MG tablet Take 1 tablet by mouth 2 times daily as needed for Pain 10/14/22   NELLY Dailey CNP   norethindrone (AYGESTIN) 5 MG tablet Take 1 tablet by mouth daily 10/14/22   NELLY Dailey CNP   Blood Pressure KIT 1 each by Does not apply route daily as needed (Hypertension) 10/14/22   NELLY Dailey - CNP       REVIEW OF SYSTEMS    (2-9 systems for level 4, 10 or more forlevel 5)      Review of Systems   Constitutional:  Positive for fever. Negative for activity change and chills. HENT:  Positive for congestion and rhinorrhea. Negative for sinus pain and sore throat. Eyes:  Negative for pain and visual disturbance. Respiratory:  Positive for shortness of breath. Negative for cough. Cardiovascular:  Positive for chest pain. Gastrointestinal:  Positive for abdominal pain. Negative for diarrhea, nausea and vomiting. Genitourinary:  Negative for difficulty urinating, dysuria and hematuria. Musculoskeletal:  Negative for back pain and myalgias. Skin:  Negative for rash and wound. Neurological:  Negative for dizziness, light-headedness and headaches. Psychiatric/Behavioral:  Negative for agitation and confusion.       PHYSICAL EXAM   (up to 7 for level 4, 8 or more forlevel 5)      ED TRIAGE VITALS BP: (!) 138/101, Temp: 98.7 °F (37.1 °C), Heart Rate: (!) 112, Resp: 18, SpO2: 100 %    Vitals: 10/31/22 1330 10/31/22 1331 10/31/22 1332   BP: (!) 138/101 (!) 138/101    Pulse:  (!) 112    Resp:  18    Temp:   98.7 °F (37.1 °C)   TempSrc:   Oral   SpO2: 100% 100%          Physical Exam  Vitals and nursing note reviewed. Constitutional:       General: She is in acute distress. Appearance: Normal appearance. She is obese. She is ill-appearing. HENT:      Head: Normocephalic and atraumatic. Nose: Nose normal.      Mouth/Throat:      Mouth: Mucous membranes are moist.   Eyes:      Extraocular Movements: Extraocular movements intact. Pupils: Pupils are equal, round, and reactive to light. Cardiovascular:      Rate and Rhythm: Regular rhythm. Tachycardia present. Pulses: Normal pulses. Heart sounds: Normal heart sounds. Pulmonary:      Effort: Pulmonary effort is normal.      Breath sounds: Normal breath sounds. Abdominal:      General: Abdomen is flat. There is no distension. Palpations: Abdomen is soft. Tenderness: There is no abdominal tenderness. There is no guarding. Musculoskeletal:         General: Normal range of motion. Cervical back: Normal range of motion. Skin:     General: Skin is warm and dry. Capillary Refill: Capillary refill takes less than 2 seconds. Findings: Bruising present. Comments: Well-healed postsurgical ports   Neurological:      General: No focal deficit present. Mental Status: She is alert and oriented to person, place, and time.    Psychiatric:      Comments: Anxious, agitated         DIFFERENTIAL  DIAGNOSIS     PLAN (LABS / IMAGING / EKG):  Orders Placed This Encounter   Procedures    RAPID INFLUENZA A/B ANTIGENS    XR CHEST PORTABLE    CT CHEST PULMONARY EMBOLISM W CONTRAST    CBC    Comprehensive Metabolic Panel    Troponin    Lipase    Magnesium    D-Dimer, Quantitative    Cardiac Monitor    Pulse Oximetry    Inpatient consult to Obstetrics / Gynecology    EKG 12 Lead    Saline lock IV       MEDICATIONS ORDERED:  ED Medication Orders (From admission, onward)      Start Ordered     Status Ordering Provider    10/31/22 1815 10/31/22 1401  methylPREDNISolone sodium (SOLU-MEDROL) injection 40 mg  ONCE         Acknowledged KAYLEN DIAZ JIAN    10/31/22 1715 10/31/22 1401  diphenhydrAMINE (BENADRYL) injection 50 mg  ONCE         Acknowledged JOE KAYLEN JIAN    10/31/22 1415 10/31/22 1401  methylPREDNISolone sodium (SOLU-MEDROL) injection 40 mg  ONCE         Last MAR action: Given - by Sumeet Rose on 10/31/22 at [address] KAYLEN JIAN    10/31/22 1345 10/31/22 1332  0.9 % sodium chloride bolus  ONCE         Last MAR action: New Bag - by Sumeet Rose on 10/31/22 at [address] [place], [place] [address]KAYLEN    10/31/22 1345 10/31/22 1333  fentaNYL (SUBLIMAZE) injection 50 mcg  ONCE         Last MAR action: Given - by Sumeet Rose on 10/31/22 at [address] KAYLEN JIAN    10/31/22 1345 10/31/22 1335  fluticasone (FLONASE) 50 MCG/ACT nasal spray 1 spray  DAILY         Last MAR action: Given - by Sumeet Rose on 10/31/22 at 1456 IDAZKAYLEN                DIAGNOSTIC RESULTS / [facility]     IMPRESSION & INITIAL PLAN:  This is a 29-year-old female present emerged part today for evaluation of chest pain viral URI symptoms abdominal pain and shortness of breath. Her symptoms been going on for last 2 days. They have been getting worse. She had a hysterectomy 5 days prior. On exam she does appear to have nasal congestion consistent with a viral URI. But more concerning is her chest pain and shortness of breath. She has sinus tachycardia with an elevated D-dimer. We will need to obtain a CT chest PE study to rule out PE. Chest x-ray negative. OB evaluated at bedside. She is allergic to contrast IV. All prep ordered. Patient signed out to Dr. Ban Prasad pending PE study.      LABS:  Results for orders placed or performed during the hospital encounter of 10/31/22   RAPID INFLUENZA A/B ANTIGENS    Specimen: Nasopharyngeal   Result Value Ref Range    Flu A Antigen NEGATIVE NEGATIVE    Flu B Antigen NEGATIVE NEGATIVE   CBC   Result Value Ref Range    WBC 13.4 (H) 3.5 - 11.3 k/uL    RBC 4.62 3.95 - 5.11 m/uL    Hemoglobin 13.4 11.9 - 15.1 g/dL    Hematocrit 41.1 36.3 - 47.1 %    MCV 89.0 82.6 - 102.9 fL    MCH 29.0 25.2 - 33.5 pg    MCHC 32.6 28.4 - 34.8 g/dL    RDW 12.7 11.8 - 14.4 %    Platelets 479 880 - 783 k/uL    MPV 9.8 8.1 - 13.5 fL    NRBC Automated 0.0 0.0 per 100 WBC   Comprehensive Metabolic Panel   Result Value Ref Range    Glucose 95 70 - 99 mg/dL    BUN 13 6 - 20 mg/dL    Creatinine 0.81 0.50 - 0.90 mg/dL    Est, Glom Filt Rate >60 >60 mL/min/1.73m2    Calcium 9.2 8.6 - 10.4 mg/dL    Sodium 132 (L) 135 - 144 mmol/L    Potassium 4.3 3.7 - 5.3 mmol/L    Chloride 98 98 - 107 mmol/L    CO2 21 20 - 31 mmol/L    Anion Gap 13 9 - 17 mmol/L    Alkaline Phosphatase 68 35 - 104 U/L    ALT 13 5 - 33 U/L    AST 14 <32 U/L    Total Bilirubin 0.5 0.3 - 1.2 mg/dL    Total Protein 7.8 6.4 - 8.3 g/dL    Albumin 4.2 3.5 - 5.2 g/dL    Albumin/Globulin Ratio 1.2 1.0 - 2.5   Troponin   Result Value Ref Range    Troponin, High Sensitivity <6 0 - 14 ng/L   Lipase   Result Value Ref Range    Lipase 16 13 - 60 U/L   Magnesium   Result Value Ref Range    Magnesium 2.0 1.6 - 2.6 mg/dL   D-Dimer, Quantitative   Result Value Ref Range    D-Dimer, Quant 1.06 mg/L FEU       RADIOLOGY:  XR CHEST PORTABLE   Final Result   Unremarkable portable frontal view of the chest.  No evidence of acute   cardiopulmonary process. CT CHEST PULMONARY EMBOLISM W CONTRAST    (Results Pending)       ECG:  Sinus tachycardia. Otherwise normal EKG.     All EKG's are interpreted by the Emergency Department Physician who either signsor Co-signs this chart in the absence of a cardiologist.    ED Course as of 10/31/22 1555   Mon Oct 31, 2022   1426 D-Dimer, Quant: 1.06 [TJ]      ED Course User Index  [TJ] Mandi Ballard MD        CONSULTS:  Nehemias Route TO OB GYN    CRITICAL CARE:  See attending physician note    FINAL IMPRESSION      1. Other chest pain    2. Shortness of breath          DISPOSITION / PLAN     DISPOSITION    Please see Dr. Burke Hilliard note     Angie Cannon:  No follow-up provider specified.     DISCHARGE MEDICATIONS:  New Prescriptions    No medications on file     Modified Medications    No medications on file        Martin Rogers MD  Emergency Medicine Resident    (Please note that portions of this note were completed with a voice recognition program.  Efforts were made to edit the dictations but occasionally words are mis-transcribed.)       Martin Rogers MD  Resident  10/31/22 [id number]

## 2022-10-31 NOTE — ED PROVIDER NOTES
Jose Hernandez  ED  Emergency Department  Emergency Medicine Resident Sign-out     Care of [facility] was assumed from Dr. Rafael Hamm and is being seen for Chest Pain  . The patient's initial evaluation and plan have been discussed with the prior provider who initially evaluated the patient. EMERGENCY DEPARTMENT COURSE / MEDICAL DECISION MAKING:       MEDICATIONS GIVEN:  Orders Placed This Encounter   Medications    0.9 % sodium chloride bolus    fentaNYL (SUBLIMAZE) injection 50 mcg    fluticasone (FLONASE) 50 MCG/ACT nasal spray 1 spray    diphenhydrAMINE (BENADRYL) injection 50 mg    methylPREDNISolone sodium (SOLU-MEDROL) injection 40 mg    methylPREDNISolone sodium (SOLU-MEDROL) injection 40 mg       LABS / RADIOLOGY:     Labs Reviewed   CBC - Abnormal; Notable for the following components:       Result Value    WBC 13.4 (*)     All other components within normal limits   COMPREHENSIVE METABOLIC PANEL - Abnormal; Notable for the following components:    Sodium 132 (*)     All other components within normal limits   RAPID INFLUENZA A/B ANTIGENS   TROPONIN   LIPASE   MAGNESIUM   D-DIMER, QUANTITATIVE   TROPONIN       XR CHEST PORTABLE    Result Date: 10/31/2022  EXAMINATION: ONE XRAY VIEW OF THE CHEST 10/31/2022 1:34 pm COMPARISON: None. HISTORY: ORDERING SYSTEM PROVIDED HISTORY: Chest Pain TECHNOLOGIST PROVIDED HISTORY: Chest Pain FINDINGS: The patient is somewhat rotated and there appears to be a large body habitus. The heart is normal in size. Central pulmonary vascularity appears normal. No evidence of pneumothorax or pleural effusion. No evidence of focal infiltrate. Unremarkable portable frontal view of the chest.  No evidence of acute cardiopulmonary process. RECENT VITALS:     Temp: 98.7 °F (37.1 °C),  Heart Rate: (!) 112, Resp: 18, BP: (!) 138/101, SpO2: 100 %      This patient is a 40 y.o.  Female presenting today for evaluation of 3-day for complaints she has shortness of breath chest pain and upper abdominal pain. She is 5 days status post hysterectomy here with OB. Concern clinically for viral URI versus PE versus ACS. OB evaluated at bedside. They do not think that this is postsurgical.  Patient awaiting contrast IV prep for CT PE rule out. Hemodynamically stable. Fluid resuscitated. Provided fentanyl for symptom control. If work-up is negative and vital stabilize could be potentially discharged home. Received 4-hour prep. Unfortunately after 4-hour prep while waiting for CT scanner, patient came in and patient not receive CT scanner. Received additional dose of Solu-Medrol an hour 5. Afterwards she did receive CT scan. CT imaging was negative for pneumonia or pulmonary embolism. Okay for discharge at this time given heart score of 2. Return precautions discussed. History: 1  EC  Patient Age: 1  *Risk factors for Atherosclerotic disease: Obesity  Risk Factors: 1  Troponin: 0  Heart Score Total: 2      ED Course as of 10/31/22 1542   Mon Oct 31, 2022   1426 D-Dimer, Quant: 1.06 [TJ]      ED Course User Index  [TJ] Brandi Schaffer MD       OUTSTANDING TASKS / RECOMMENDATIONS:    IV contrast prep, CT  Recheck vital signs, disposition per imaging      FINAL IMPRESSION:   No diagnosis found. DISPOSITION:         DISPOSITION:  [x]  Discharge   []  Transfer -    []  Admission -     []  Against Medical Advice   []  Eloped   FOLLOW-UP: No follow-up provider specified.    DISCHARGE MEDICATIONS: New Prescriptions    No medications on file          Cristal Hashimoto, DO  Emergency Medicine Resident  [address] Cristal Hashimoto, [address] [facility]  Resident  [phone]

## 2022-11-01 ENCOUNTER — TELEPHONE (OUTPATIENT)
Dept: OBGYN CLINIC | Age: 37
End: 2022-11-01

## 2022-11-01 NOTE — TELEPHONE ENCOUNTER
----- Message from Jama Carrasco, DO sent at 10/26/2022  8:50 PM EDT -----  Can we make sure this patient has a follow up appointment with someone in the office in 2-3 weeks? I already warned her that her post op wont be with me. Thank you!

## 2022-11-01 NOTE — ED NOTES
Pt up ambulated to restroom with brother at her side   Returned to room back on monitor no complaints voiced   Call light in reach      [facility]  10/31/22 8719

## 2022-11-01 NOTE — DISCHARGE INSTRUCTIONS
You were seen today for chest pain, based on your complaint we did perform laboratory work to evaluate your heart function which demonstrated no signs of heart attack. We also performed a CT of your chest to evaluate for pulmonary embolism or pneumonia. The CT demonstrated no pulmonary embolism and no signs of pneumonia. Take your medication as indicated. For pain use ibuprofen (Motrin / Advil) or acetaminophen (Tylenol), unless prescribed medications that have acetaminophen in it. You may take the given ibuprofen 1 tablet every 8 hours. You may also take 1000 mg of Tylenol every 8 hours. Not take more than 4000 mg of Tylenol in 1 day    PLEASE RETURN TO THE EMERGENCY DEPARTMENT IMMEDIATELY for worsening symptoms of increasing pain, shortness of breath, feeling of your heart fluttering or racing, swelling to your feet, unable to lay flat, or if you develop any concerning symptoms such as: high fever not relieved by acetaminophen (Tylenol) and/or ibuprofen (Motrin / Advil), chills, persistent nausea and/or vomiting, loss of consciousness, numbness, weakness or tingling in the arms or legs or change in color of the extremities, changes in mental status, persistent headache, blurry vision, loss of bladder / bowel control, unable to follow up with your physician, or other any other care or concern.

## 2022-11-02 LAB
EKG ATRIAL RATE: 120 BPM
EKG P AXIS: 29 DEGREES
EKG P-R INTERVAL: 148 MS
EKG Q-T INTERVAL: 314 MS
EKG QRS DURATION: 88 MS
EKG QTC CALCULATION (BAZETT): 443 MS
EKG R AXIS: 70 DEGREES
EKG T AXIS: 30 DEGREES
EKG VENTRICULAR RATE: 120 BPM

## 2022-11-02 PROCEDURE — 93010 ELECTROCARDIOGRAM REPORT: CPT | Performed by: INTERNAL MEDICINE

## 2022-11-09 ENCOUNTER — OFFICE VISIT (OUTPATIENT)
Dept: OBGYN CLINIC | Age: 37
End: 2022-11-09

## 2022-11-09 VITALS
HEART RATE: 111 BPM | BODY MASS INDEX: 38.62 KG/M2 | WEIGHT: 254 LBS | DIASTOLIC BLOOD PRESSURE: 100 MMHG | SYSTOLIC BLOOD PRESSURE: 130 MMHG

## 2022-11-09 DIAGNOSIS — Z09 POSTOP CHECK: Primary | ICD-10-CM

## 2022-11-09 PROCEDURE — 99024 POSTOP FOLLOW-UP VISIT: CPT | Performed by: STUDENT IN AN ORGANIZED HEALTH CARE EDUCATION/TRAINING PROGRAM

## 2022-11-09 NOTE — PROGRESS NOTES
Postoperative Visit    2 WEEK POST OP VISIT NOTE:  SUBJECTIVE: The patient is a 40 y.o. [id number] who underwent LAVH BS w/ Cysto on 10/25/22 for pelvic pain. She presents today for post-operative followup. She reports that her bowel function is normal.  She reports that her bladder function is normal.  She has noted minimal vaginal bleeding. She has no complaints regarding her incision(s). REVIEW OF SYSTEMS:  Constitutional: negative fever, negative chills  HEENT: negative visual disturbances, negative headaches  Respiratory: negative dyspnea, negative cough  Cardiovascular: negative chest pain,  negative palpitations  Gastrointestinal: negative abdominal pain, negative RUQ pain, negative N/V, negative diarrhea, negative constipation  Genitourinary: negative dysuria, negative vaginal discharge  Dermatological: negative rash  Hematologic: negative bruising  Immunologic/Lymphatic: negative recent illness, negative recent sick contact  Musculoskeletal: negative back pain  Neurological:  negative dizziness, negative weakness  Behavior/Psych: negative depression, negative anxiety        PHYSICAL EXAMINATION:  Vitals:    11/09/22 1421 11/09/22 1448   BP: (!) 150/97 (!) 130/100   Site: Right Upper Arm Right Lower Arm   Position: Sitting Sitting   Cuff Size: Large Adult Large Adult   Pulse: (!) 111    Weight: 254 lb (115.2 kg)          GENERAL: She is a well-appearing female, awake and oriented x3. LUNGS: Clear to auscultation and percussion bilaterally. HEART: Regular rate and rhythm without murmur or gallop. ABDOMEN: Soft and nontender. Incision is well healed, without erythema. EXTREMITIES: No edema and were nontender, negative gary's. PELVIC: deferred      IMPRESSION:  1. s/p LAVH BS w/ Cysto    PLAN:  1. We will see the patient again in 4 weeks. 2. Activity restrictions were reinforced.     3. Follow up with PCP for BP management      Elroy Rodriguez Ob/Gyn   11/9/2022, 2:55 PM

## 2022-11-09 NOTE — LETTER
[facility] [address] [address]  Phone: [phone]  Fax: [phone]    Mateusz Gandhi DO        November 9, 2022      To Whom It May Concern:    Bevely Hankamer due to her recent surgery she may not lift more than 15 pounds until her next appointment in 4 weeks. If you have any questions give our office a call at [phone].        Sincerely,        Mateusz Gandhi DO

## 2023-09-11 ENCOUNTER — HOSPITAL ENCOUNTER (EMERGENCY)
Age: 38
Discharge: HOME | End: 2023-09-11
Attending: EMERGENCY MEDICINE
Payer: MEDICAID

## 2023-09-11 VITALS
SYSTOLIC BLOOD PRESSURE: 145 MMHG | OXYGEN SATURATION: 100 % | TEMPERATURE: 97.7 F | HEART RATE: 82 BPM | DIASTOLIC BLOOD PRESSURE: 106 MMHG | RESPIRATION RATE: 18 BRPM

## 2023-09-11 DIAGNOSIS — S86.911A KNEE STRAIN, RIGHT, INITIAL ENCOUNTER: Primary | ICD-10-CM

## 2023-09-11 PROCEDURE — 6370000000 HC RX 637 (ALT 250 FOR IP): Performed by: STUDENT IN AN ORGANIZED HEALTH CARE EDUCATION/TRAINING PROGRAM

## 2023-09-11 PROCEDURE — 99283 EMERGENCY DEPT VISIT LOW MDM: CPT

## 2023-09-11 RX ORDER — IBUPROFEN 800 MG/1
800 TABLET ORAL ONCE
Status: COMPLETED | OUTPATIENT
Start: 2023-09-11 | End: 2023-09-11

## 2023-09-11 RX ADMIN — IBUPROFEN 800 MG: 800 TABLET, FILM COATED ORAL at 14:38

## 2023-09-11 ASSESSMENT — ENCOUNTER SYMPTOMS
COLOR CHANGE: 0
BACK PAIN: 0

## 2023-09-11 NOTE — ED NOTES
Patient registered with the social security number given to nurse. Patient verified identity with 2 identifying factors.        Adair Arguelles RN  09/11/23 3797

## 2023-09-11 NOTE — ED NOTES
Pt presents with right knee pain that has been ongoing for a \"while\" but that she now is having \"new\" pain that is not resolved by tylenol. Pt also states some tingling to the bottom of the feet as well. Pt wheeled back to chair. Pt alert and oriented, resting in stretcher, no distress noted, no deformity noted.       Balbir Hess RN  09/11/23 1405

## 2023-09-11 NOTE — ED PROVIDER NOTES
[address] ED  Emergency Department Encounter  Emergency Medicine Resident     Pt Name:Tonya Bryan  MRN: [id number]  [facility] 1985  Date of evaluation: 23  PCP:  NELLY Leonard CNP  Note Started: 2:04 PM EDT      CHIEF COMPLAINT       Chief Complaint   Patient presents with    Knee Pain       HISTORY OF PRESENT ILLNESS  (Location/Symptom, Timing/Onset, Context/Setting, Quality, Duration, Modifying Factors, Severity.)      Karmen Santoro is a 41 y.o. female who presents with right knee pain. Pt states she typicaly always has low grade right knee pain due to arthritis however today while at work she felt a sharp pain while kneeling and twisting. She went to urgent care where an Xray was performed which showed no fractures per patient. During the Xray she beared weight and twisted causing a worsening pain. She arrived here being pushed in a wheelchair. She is able to bend knee past 90 degrees. No appreciable swelling or laxity. No redness to the joint. Denies fevers. PAST MEDICAL / SURGICAL / SOCIAL / FAMILY HISTORY      has a past medical history of COVID-19, Depression, Elevated blood pressure reading, Endometriosis, Fibroid uterus, PCOS (polycystic ovarian syndrome), Snores, Thyroid disease, Wears eyeglasses, and Wellness examination. has a past surgical history that includes  section; Tubal ligation; Pilger tooth extraction (); Laparoscopic hysterectomy (10/25/2022); and Hysterectomy (N/A, 10/25/2022).       Social History     Socioeconomic History    Marital status: Single     Spouse name: Not on file    Number of children: Not on file    Years of education: Not on file    Highest education level: Not on file   Occupational History    Not on file   Tobacco Use    Smoking status: Some Days     Packs/day: 0.25     Years: 0.50     Additional pack years: 0.00     Total pack years: 0.13     Types: Cigarettes     Start date: 2022    Smokeless tobacco: Never
treatment. Medical Decision Making  Problems Addressed:  Knee strain, right, initial encounter: acute illness or injury    Risk  Prescription drug management. (Please note that portions of this note were completed with a voice recognition program.  Efforts were made to edit the dictations but occasionally words are mis-transcribed.)    Tabitha Kamara.  Rachel Houser MD, [facility]  Attending Emergency Medicine Physician        Taylor Lawrence MD  09/11/23 0166

## 2023-09-11 NOTE — DISCHARGE INSTRUCTIONS
You likely have a knee sprain or ligament injury. You can wear an ace wrap to help with compression of the joint. Apply ice for up to 20 minutes at a time with an hour in between. Make an appointment as soon as possible with your family care practitioner for ongoing management as they may require further imaging and/or referral to orthopedic specialist and physical therapy. I called the office today and there are appointments available this week. Continue to take Aleve to help with pain and inflammation. Return to emergency department if you develop fevers, redness of the joint, unable to bend your knee, unable to bear weight due to worsening pain, or falls.

## 2023-09-12 ENCOUNTER — OFFICE VISIT (OUTPATIENT)
Dept: FAMILY MEDICINE CLINIC | Age: 38
End: 2023-09-12
Payer: MEDICAID

## 2023-09-12 VITALS
BODY MASS INDEX: 38.62 KG/M2 | HEIGHT: 68 IN | SYSTOLIC BLOOD PRESSURE: 136 MMHG | OXYGEN SATURATION: 99 % | HEART RATE: 85 BPM | DIASTOLIC BLOOD PRESSURE: 76 MMHG

## 2023-09-12 DIAGNOSIS — M25.561 ACUTE PAIN OF RIGHT KNEE: Primary | ICD-10-CM

## 2023-09-12 DIAGNOSIS — E03.9 ACQUIRED HYPOTHYROIDISM: ICD-10-CM

## 2023-09-12 PROCEDURE — 99214 OFFICE O/P EST MOD 30 MIN: CPT

## 2023-09-12 RX ORDER — KETOROLAC TROMETHAMINE 30 MG/ML
30 INJECTION, SOLUTION INTRAMUSCULAR; INTRAVENOUS ONCE
Status: COMPLETED | OUTPATIENT
Start: 2023-09-12 | End: 2023-09-12

## 2023-09-12 RX ORDER — TRAMADOL HYDROCHLORIDE 50 MG/1
50 TABLET ORAL EVERY 6 HOURS PRN
Qty: 28 TABLET | Refills: 0 | Status: SHIPPED | OUTPATIENT
Start: 2023-09-12 | End: 2023-09-19

## 2023-09-12 RX ORDER — LEVOTHYROXINE SODIUM 0.07 MG/1
75 TABLET ORAL DAILY
Qty: 90 TABLET | Refills: 1 | Status: SHIPPED | OUTPATIENT
Start: 2023-09-12 | End: 2024-09-11

## 2023-09-12 RX ADMIN — KETOROLAC TROMETHAMINE 30 MG: 30 INJECTION, SOLUTION INTRAMUSCULAR; INTRAVENOUS at 09:37

## 2023-09-12 SDOH — ECONOMIC STABILITY: FOOD INSECURITY: WITHIN THE PAST 12 MONTHS, YOU WORRIED THAT YOUR FOOD WOULD RUN OUT BEFORE YOU GOT MONEY TO BUY MORE.: NEVER TRUE

## 2023-09-12 SDOH — ECONOMIC STABILITY: HOUSING INSECURITY
IN THE LAST 12 MONTHS, WAS THERE A TIME WHEN YOU DID NOT HAVE A STEADY PLACE TO SLEEP OR SLEPT IN A SHELTER (INCLUDING NOW)?: NO

## 2023-09-12 SDOH — ECONOMIC STABILITY: FOOD INSECURITY: WITHIN THE PAST 12 MONTHS, THE FOOD YOU BOUGHT JUST DIDN'T LAST AND YOU DIDN'T HAVE MONEY TO GET MORE.: NEVER TRUE

## 2023-09-12 SDOH — ECONOMIC STABILITY: INCOME INSECURITY: HOW HARD IS IT FOR YOU TO PAY FOR THE VERY BASICS LIKE FOOD, HOUSING, MEDICAL CARE, AND HEATING?: NOT HARD AT ALL

## 2023-09-12 ASSESSMENT — ENCOUNTER SYMPTOMS
TROUBLE SWALLOWING: 0
SHORTNESS OF BREATH: 0
EYE ITCHING: 0
WHEEZING: 0
SINUS PAIN: 0
SORE THROAT: 0
CHEST TIGHTNESS: 0
SINUS PRESSURE: 0
ABDOMINAL PAIN: 0
DIARRHEA: 0
VOMITING: 0
COUGH: 0
EYE DISCHARGE: 0
EYE REDNESS: 0
NAUSEA: 0

## 2023-09-12 ASSESSMENT — PATIENT HEALTH QUESTIONNAIRE - PHQ9
10. IF YOU CHECKED OFF ANY PROBLEMS, HOW DIFFICULT HAVE THESE PROBLEMS MADE IT FOR YOU TO DO YOUR WORK, TAKE CARE OF THINGS AT HOME, OR GET ALONG WITH OTHER PEOPLE: 0
4. FEELING TIRED OR HAVING LITTLE ENERGY: 0
6. FEELING BAD ABOUT YOURSELF - OR THAT YOU ARE A FAILURE OR HAVE LET YOURSELF OR YOUR FAMILY DOWN: 0
SUM OF ALL RESPONSES TO PHQ QUESTIONS 1-9: 0
8. MOVING OR SPEAKING SO SLOWLY THAT OTHER PEOPLE COULD HAVE NOTICED. OR THE OPPOSITE, BEING SO FIGETY OR RESTLESS THAT YOU HAVE BEEN MOVING AROUND A LOT MORE THAN USUAL: 0
1. LITTLE INTEREST OR PLEASURE IN DOING THINGS: 0
9. THOUGHTS THAT YOU WOULD BE BETTER OFF DEAD, OR OF HURTING YOURSELF: 0
5. POOR APPETITE OR OVEREATING: 0
SUM OF ALL RESPONSES TO PHQ9 QUESTIONS 1 & 2: 0
SUM OF ALL RESPONSES TO PHQ QUESTIONS 1-9: 0
3. TROUBLE FALLING OR STAYING ASLEEP: 0
2. FEELING DOWN, DEPRESSED OR HOPELESS: 0
7. TROUBLE CONCENTRATING ON THINGS, SUCH AS READING THE NEWSPAPER OR WATCHING TELEVISION: 0
SUM OF ALL RESPONSES TO PHQ QUESTIONS 1-9: 0
SUM OF ALL RESPONSES TO PHQ QUESTIONS 1-9: 0

## 2023-09-12 NOTE — PATIENT INSTRUCTIONS
New Updates for My [facility]    Thank you for choosing US to give you the best care! [address] [place] [address] is always trying to think of new ways to help their patients. We are asking all patients to try out the new digital registration that is now available through the new [facility], feel free to download today. Via the yvonne you're now able to update your personal and registration information prior to your upcoming appointment. This will save you time once you arrive at the office to check-in, not to mention your information remains safe!! Many other perks come from signing up for an account, such as:  Requesting refills  Scheduling an appointment  Completing an E-Visit  Sending a message to the office/provider  Having access to your medication list  Paying your bill/copay prior to your appointment  Scheduling your yearly mammogram  Review your test results    If you are not familiar with the [facility], please ask one of us and we will be happy to answer any questions or help you set-up your account.

## 2023-09-12 NOTE — PROGRESS NOTES
medication adherence emphasized and lifestyle modifications recommended  Orders:  -     levothyroxine (SYNTHROID) 75 MCG tablet; Take 1 tablet by mouth Daily, Disp-90 tablet, R-1Normal     Acute knee pain-  Will provide the patient with pain medications for the acute pain. Order an MRI to evaluate for soft tissue damage. Referral to ortho for further evaluation. Return if symptoms worsen or fail to improve. Orders Placed This Encounter   Procedures    MRI KNEE RIGHT WO CONTRAST     Standing Status:   Future     Standing Expiration Date:   9/12/2024     Order Specific Question:   Reason for exam:     Answer:   Sudden onset of right knee pain. No injury or trauma. Significant reduction in ability to move the limb. Order Specific Question:   What is the sedation requirement? Answer:   Sebastian Lovelace MD, Orthopaedic Surgery, Gabi Swanson/Homer     Referral Priority:   Routine     Referral Type:   Eval and Treat     Referral Reason:   Specialty Services Required     Referred to Provider:   Jenna Teague MD     Requested Specialty:   Orthopedic Surgery     Number of Visits Requested:   1     Orders Placed This Encounter   Medications    traMADol (ULTRAM) 50 MG tablet     Sig: Take 1 tablet by mouth every 6 hours as needed for Pain for up to 7 days. Intended supply: 7 days. Take lowest dose possible to manage pain Max Daily Amount: 200 mg     Dispense:  28 tablet     Refill:  0     Reduce doses taken as pain becomes manageable    ketorolac (TORADOL) injection 30 mg    levothyroxine (SYNTHROID) 75 MCG tablet     Sig: Take 1 tablet by mouth Daily     Dispense:  90 tablet     Refill:  1       Reviewed health maintenance, prior labs and imaging. Patient given educational materials - see patient instructions. Discussed use, benefit, and side effects of prescribed medications. Barriers to medication compliance addressed. All patient questions answered.   Pt voiced understanding to plan of

## 2023-09-14 DIAGNOSIS — F32.A DEPRESSION, UNSPECIFIED DEPRESSION TYPE: ICD-10-CM

## 2023-09-14 NOTE — TELEPHONE ENCOUNTER
Geeta Camacho is calling to request a refill on the following medication(s):    Medication Request:  Requested Prescriptions     Pending Prescriptions Disp Refills    venlafaxine (EFFEXOR XR) 75 MG extended release capsule 30 capsule 5     Sig: TAKE ONE CAPSULE BY MOUTH WITH FOOD ONCE DAILY       Last Visit Date (If Applicable):  4/74/0562    Next Visit Date:    Visit date not found

## 2023-09-15 ENCOUNTER — HOSPITAL ENCOUNTER (OUTPATIENT)
Dept: MRI IMAGING | Age: 38
End: 2023-09-15
Payer: MEDICAID

## 2023-09-15 DIAGNOSIS — M25.561 ACUTE PAIN OF RIGHT KNEE: ICD-10-CM

## 2023-09-15 PROCEDURE — 73721 MRI JNT OF LWR EXTRE W/O DYE: CPT

## 2023-09-15 RX ORDER — VENLAFAXINE HYDROCHLORIDE 75 MG/1
CAPSULE, EXTENDED RELEASE ORAL
Qty: 90 CAPSULE | Refills: 1 | Status: SHIPPED | OUTPATIENT
Start: 2023-09-15 | End: 2023-11-16 | Stop reason: SDUPTHER

## 2023-09-19 ENCOUNTER — OFFICE VISIT (OUTPATIENT)
Age: 38
End: 2023-09-19

## 2023-09-19 VITALS — BODY MASS INDEX: 38.49 KG/M2 | HEIGHT: 68 IN | WEIGHT: 254 LBS

## 2023-09-19 DIAGNOSIS — M17.11 PATELLOFEMORAL ARTHRITIS OF RIGHT KNEE: Primary | ICD-10-CM

## 2023-09-19 RX ORDER — LIDOCAINE HYDROCHLORIDE 10 MG/ML
2 INJECTION, SOLUTION INFILTRATION; PERINEURAL ONCE
Status: COMPLETED | OUTPATIENT
Start: 2023-09-19 | End: 2023-09-19

## 2023-09-19 RX ORDER — METHYLPREDNISOLONE ACETATE 80 MG/ML
80 INJECTION, SUSPENSION INTRA-ARTICULAR; INTRALESIONAL; INTRAMUSCULAR; SOFT TISSUE ONCE
Status: COMPLETED | OUTPATIENT
Start: 2023-09-19 | End: 2023-09-19

## 2023-09-19 RX ADMIN — METHYLPREDNISOLONE ACETATE 80 MG: 80 INJECTION, SUSPENSION INTRA-ARTICULAR; INTRALESIONAL; INTRAMUSCULAR; SOFT TISSUE at 11:34

## 2023-09-19 RX ADMIN — LIDOCAINE HYDROCHLORIDE 2 ML: 10 INJECTION, SOLUTION INFILTRATION; PERINEURAL at 11:34

## 2023-09-19 SDOH — HEALTH STABILITY: PHYSICAL HEALTH: ON AVERAGE, HOW MANY MINUTES DO YOU ENGAGE IN EXERCISE AT THIS LEVEL?: 100 MIN

## 2023-09-19 SDOH — HEALTH STABILITY: PHYSICAL HEALTH: ON AVERAGE, HOW MANY DAYS PER WEEK DO YOU ENGAGE IN MODERATE TO STRENUOUS EXERCISE (LIKE A BRISK WALK)?: 5 DAYS

## 2023-09-19 NOTE — PROGRESS NOTES
Administrations This Visit       lidocaine 1 % injection 2 mL       Admin Date  09/19/2023  11:34 Action  Given Dose  2 mL Route  Intra-artICUlar Site  Knee Right Administered By  Iman Avila MA    Ordering Provider: Oneyda Skaggs MD    NDC: 71063-212-63    Lot#: [id number]    : [address]    Patient Supplied?: No              methylPREDNISolone acetate (DEPO-MEDROL) injection 80 mg       Admin Date  09/19/2023  11:34 Action  Given Dose  80 mg Route  Intra-artICUlar Site  Knee Right Administered By  Iman Avila MA    Ordering Provider: Oneyda Skaggs MD    NDC: 2866-3782-58    Lot#: [id number]    : [place]  [address] [place] [address]. Patient Supplied?: No                    Medication was administered by provider, Dr Deejay Mckeon. No adverse reactions.     Iman Avila MA.

## 2023-09-19 NOTE — PROGRESS NOTES
[place] [address] AND SPORTS MEDICINE  [address] [place] [address] [facility] [address]  [place] [address]  Dept: [phone]  Dept Fax: [phone]    Chief Compliant:  Chief Complaint   Patient presents with    Leg Pain     Tonya is here today for right leg pain. Driving and went numb. She is taking advil for pain. PCP gave tramadol but she does not like taking it. History of Present Illness: This is a pleasant 41 y.o. female who is here for evaluation for right knee pain. She states that she works as a . It sounds as though she has had some chronic issues with her right knee noting some grinding with patellofemoral motion and some pain with squatting or bending over however that has been tolerable. About a week or so ago she was driving and she states that she had sudden onset severe pain in her right knee. She noted some swelling in the knee. She noted some numbness going down into her right foot. She could not walk on it at that time. She has been resting and icing and taking Advil and tramadol. The pain has gotten better. She states the swelling has gotten better. Physical Exam: She is in a wheelchair in the office today. Initially she is very hesitant to do any motion of the knee however during the exam she is able to take the knee from full extension to near full flexion. There is a trace effusion in the knee. She does have patellofemoral crepitus with range of motion. There is no patellar instability. There is no tenderness to the medial or lateral joint line. She did have some discomfort with varus stress of the knee but no instability. There was no discomfort with valgus stress. She has an antalgic gait pattern. She was able to bear weight and get up onto the examination table. The calf is soft. No tenderness to the calf.     Imaging: MRI images and radiologist

## 2023-10-18 ENCOUNTER — TELEPHONE (OUTPATIENT)
Dept: FAMILY MEDICINE CLINIC | Age: 38
End: 2023-10-18

## 2023-10-18 NOTE — TELEPHONE ENCOUNTER
Patient called to [facility] appt on 10/18/2023, patient informed that due to her no show history she has been dismissed from [facility].  Patient understood and informed that she can still see any provider within [facility]

## 2023-10-18 NOTE — TELEPHONE ENCOUNTER
----- Message from Yamilex Guerrier sent at 10/18/2023 11:05 AM EDT -----  Subject: Appointment Request    Reason for Call: New Patient/New to Provider Appointment needed: Routine   Existing Condition Follow Up    QUESTIONS    Reason for appointment request? No appointments available during search     Additional Information for Provider? PT is reaching out to schedule in her   missed appointment with Carmen Saleem for her Thyroid.  Please reach out to   the PT to discuss and schedule.   ---------------------------------------------------------------------------  --------------  Joesph CATALAN  [phone]; OK to leave message on voicemail  ---------------------------------------------------------------------------  --------------  SCRIPT ANSWERS

## 2023-11-02 ENCOUNTER — OFFICE VISIT (OUTPATIENT)
Dept: PRIMARY CARE CLINIC | Age: 38
End: 2023-11-02
Payer: MEDICAID

## 2023-11-02 VITALS
HEIGHT: 68 IN | OXYGEN SATURATION: 98 % | DIASTOLIC BLOOD PRESSURE: 92 MMHG | BODY MASS INDEX: 40.1 KG/M2 | HEART RATE: 88 BPM | WEIGHT: 264.6 LBS | SYSTOLIC BLOOD PRESSURE: 138 MMHG

## 2023-11-02 DIAGNOSIS — E66.01 CLASS 3 SEVERE OBESITY DUE TO EXCESS CALORIES WITH SERIOUS COMORBIDITY AND BODY MASS INDEX (BMI) OF 40.0 TO 44.9 IN ADULT (HCC): ICD-10-CM

## 2023-11-02 DIAGNOSIS — Z82.49 FAMILY HISTORY OF PREMATURE CORONARY ARTERY DISEASE: ICD-10-CM

## 2023-11-02 DIAGNOSIS — E28.2 PCO (POLYCYSTIC OVARIES): ICD-10-CM

## 2023-11-02 DIAGNOSIS — E55.9 VITAMIN D DEFICIENCY: ICD-10-CM

## 2023-11-02 DIAGNOSIS — E03.9 ACQUIRED HYPOTHYROIDISM: ICD-10-CM

## 2023-11-02 DIAGNOSIS — D50.0 IRON DEFICIENCY ANEMIA DUE TO CHRONIC BLOOD LOSS: ICD-10-CM

## 2023-11-02 DIAGNOSIS — E88.819 INSULIN RESISTANCE: ICD-10-CM

## 2023-11-02 DIAGNOSIS — G47.19 EXCESSIVE DAYTIME SLEEPINESS: Primary | ICD-10-CM

## 2023-11-02 PROBLEM — E66.813 CLASS 3 SEVERE OBESITY DUE TO EXCESS CALORIES WITH SERIOUS COMORBIDITY IN ADULT: Status: ACTIVE | Noted: 2023-11-02

## 2023-11-02 PROCEDURE — 99214 OFFICE O/P EST MOD 30 MIN: CPT | Performed by: STUDENT IN AN ORGANIZED HEALTH CARE EDUCATION/TRAINING PROGRAM

## 2023-11-02 PROCEDURE — 36415 COLL VENOUS BLD VENIPUNCTURE: CPT | Performed by: STUDENT IN AN ORGANIZED HEALTH CARE EDUCATION/TRAINING PROGRAM

## 2023-11-02 SDOH — HEALTH STABILITY: PHYSICAL HEALTH: ON AVERAGE, HOW MANY DAYS PER WEEK DO YOU ENGAGE IN MODERATE TO STRENUOUS EXERCISE (LIKE A BRISK WALK)?: 5 DAYS

## 2023-11-02 SDOH — HEALTH STABILITY: PHYSICAL HEALTH: ON AVERAGE, HOW MANY MINUTES DO YOU ENGAGE IN EXERCISE AT THIS LEVEL?: 150+ MIN

## 2023-11-03 ENCOUNTER — HOSPITAL ENCOUNTER (OUTPATIENT)
Age: 38
Setting detail: SPECIMEN
Discharge: HOME | End: 2023-11-03

## 2023-11-03 DIAGNOSIS — E55.9 VITAMIN D DEFICIENCY: ICD-10-CM

## 2023-11-03 DIAGNOSIS — Z82.49 FAMILY HISTORY OF PREMATURE CORONARY ARTERY DISEASE: ICD-10-CM

## 2023-11-03 DIAGNOSIS — E66.01 CLASS 3 SEVERE OBESITY DUE TO EXCESS CALORIES WITH SERIOUS COMORBIDITY AND BODY MASS INDEX (BMI) OF 40.0 TO 44.9 IN ADULT (HCC): ICD-10-CM

## 2023-11-03 DIAGNOSIS — E28.2 PCO (POLYCYSTIC OVARIES): ICD-10-CM

## 2023-11-03 DIAGNOSIS — D50.0 IRON DEFICIENCY ANEMIA DUE TO CHRONIC BLOOD LOSS: ICD-10-CM

## 2023-11-03 DIAGNOSIS — E88.819 INSULIN RESISTANCE: ICD-10-CM

## 2023-11-03 DIAGNOSIS — E03.9 ACQUIRED HYPOTHYROIDISM: ICD-10-CM

## 2023-11-03 LAB
BASOPHILS # BLD: 0.06 K/UL (ref 0–0.2)
BASOPHILS NFR BLD: 1 % (ref 0–2)
EOSINOPHIL # BLD: 0.15 K/UL (ref 0–0.44)
EOSINOPHILS RELATIVE PERCENT: 2 % (ref 1–4)
ERYTHROCYTE [DISTWIDTH] IN BLOOD BY AUTOMATED COUNT: 12.5 % (ref 11.8–14.4)
EST. AVERAGE GLUCOSE BLD GHB EST-MCNC: 103 MG/DL
HBA1C MFR BLD: 5.2 % (ref 4–6)
HCT VFR BLD AUTO: 38.5 % (ref 36.3–47.1)
HGB BLD-MCNC: 12.6 G/DL (ref 11.9–15.1)
IMM GRANULOCYTES # BLD AUTO: <0.03 K/UL (ref 0–0.3)
IMM GRANULOCYTES NFR BLD: 0 %
LYMPHOCYTES NFR BLD: 1.69 K/UL (ref 1.1–3.7)
LYMPHOCYTES RELATIVE PERCENT: 23 % (ref 24–43)
MCH RBC QN AUTO: 28.9 PG (ref 25.2–33.5)
MCHC RBC AUTO-ENTMCNC: 32.7 G/DL (ref 28.4–34.8)
MCV RBC AUTO: 88.3 FL (ref 82.6–102.9)
MONOCYTES NFR BLD: 0.44 K/UL (ref 0.1–1.2)
MONOCYTES NFR BLD: 6 % (ref 3–12)
NEUTROPHILS NFR BLD: 68 % (ref 36–65)
NEUTS SEG NFR BLD: 5.14 K/UL (ref 1.5–8.1)
NRBC BLD-RTO: 0 PER 100 WBC
PLATELET # BLD AUTO: 334 K/UL (ref 138–453)
PMV BLD AUTO: 9.7 FL (ref 8.1–13.5)
RBC # BLD AUTO: 4.36 M/UL (ref 3.95–5.11)
WBC OTHER # BLD: 7.5 K/UL (ref 3.5–11.3)

## 2023-11-04 LAB
25(OH)D3 SERPL-MCNC: 32.1 NG/ML
ALBUMIN SERPL-MCNC: 4.1 G/DL (ref 3.5–5.2)
ALBUMIN/GLOB SERPL: 1.4 {RATIO} (ref 1–2.5)
ALP SERPL-CCNC: 66 U/L (ref 35–104)
ALT SERPL-CCNC: 11 U/L (ref 5–33)
ANION GAP SERPL CALCULATED.3IONS-SCNC: 12 MMOL/L (ref 9–17)
AST SERPL-CCNC: 10 U/L
BILIRUB SERPL-MCNC: 0.4 MG/DL (ref 0.3–1.2)
BUN SERPL-MCNC: 14 MG/DL (ref 6–20)
CALCIUM SERPL-MCNC: 9.4 MG/DL (ref 8.6–10.4)
CHLORIDE SERPL-SCNC: 103 MMOL/L (ref 98–107)
CHOLEST SERPL-MCNC: 169 MG/DL
CHOLESTEROL/HDL RATIO: 2.8
CO2 SERPL-SCNC: 23 MMOL/L (ref 20–31)
CREAT SERPL-MCNC: 0.9 MG/DL (ref 0.5–0.9)
FERRITIN SERPL-MCNC: 137 NG/ML (ref 13–150)
GFR SERPL CREATININE-BSD FRML MDRD: >60 ML/MIN/1.73M2
GLUCOSE P FAST SERPL-MCNC: 79 MG/DL (ref 70–99)
HDLC SERPL-MCNC: 61 MG/DL
IRON SATN MFR SERPL: 46 % (ref 20–55)
IRON SERPL-MCNC: 114 UG/DL (ref 37–145)
LDLC SERPL CALC-MCNC: 98 MG/DL (ref 0–130)
POTASSIUM SERPL-SCNC: 4.4 MMOL/L (ref 3.7–5.3)
PROT SERPL-MCNC: 7.1 G/DL (ref 6.4–8.3)
SODIUM SERPL-SCNC: 138 MMOL/L (ref 135–144)
T4 FREE SERPL-MCNC: 1.4 NG/DL (ref 0.9–1.7)
TIBC SERPL-MCNC: 247 UG/DL (ref 250–450)
TRIGL SERPL-MCNC: 51 MG/DL
TSH SERPL DL<=0.05 MIU/L-ACNC: 1.28 UIU/ML (ref 0.3–5)
UNSATURATED IRON BINDING CAPACITY: 133 UG/DL (ref 112–347)

## 2023-11-09 ENCOUNTER — HOSPITAL ENCOUNTER (OUTPATIENT)
Dept: SLEEP CENTER | Age: 38
Discharge: HOME | End: 2023-11-11
Attending: STUDENT IN AN ORGANIZED HEALTH CARE EDUCATION/TRAINING PROGRAM
Payer: MEDICAID

## 2023-11-09 DIAGNOSIS — G47.19 EXCESSIVE DAYTIME SLEEPINESS: ICD-10-CM

## 2023-11-09 PROCEDURE — 95810 POLYSOM 6/> YRS 4/> PARAM: CPT

## 2023-11-10 VITALS
RESPIRATION RATE: 16 BRPM | BODY MASS INDEX: 40.01 KG/M2 | WEIGHT: 264 LBS | OXYGEN SATURATION: 97 % | HEIGHT: 68 IN | HEART RATE: 83 BPM

## 2023-11-16 ENCOUNTER — CLINICAL DOCUMENTATION (OUTPATIENT)
Dept: PRIMARY CARE CLINIC | Age: 38
End: 2023-11-16

## 2023-11-16 ENCOUNTER — OFFICE VISIT (OUTPATIENT)
Dept: PRIMARY CARE CLINIC | Age: 38
End: 2023-11-16
Payer: MEDICAID

## 2023-11-16 VITALS
WEIGHT: 265 LBS | SYSTOLIC BLOOD PRESSURE: 132 MMHG | OXYGEN SATURATION: 97 % | DIASTOLIC BLOOD PRESSURE: 84 MMHG | HEIGHT: 68 IN | BODY MASS INDEX: 40.16 KG/M2 | HEART RATE: 110 BPM

## 2023-11-16 DIAGNOSIS — Z80.8 FAMILY HISTORY OF MELANOMA: ICD-10-CM

## 2023-11-16 DIAGNOSIS — E03.9 ACQUIRED HYPOTHYROIDISM: Primary | ICD-10-CM

## 2023-11-16 DIAGNOSIS — F33.42 RECURRENT MAJOR DEPRESSIVE DISORDER, IN FULL REMISSION (HCC): ICD-10-CM

## 2023-11-16 DIAGNOSIS — D48.9 NEOPLASM OF UNCERTAIN BEHAVIOR: ICD-10-CM

## 2023-11-16 DIAGNOSIS — F32.A DEPRESSION, UNSPECIFIED DEPRESSION TYPE: ICD-10-CM

## 2023-11-16 DIAGNOSIS — R03.0 ELEVATED BLOOD PRESSURE READING: ICD-10-CM

## 2023-11-16 PROBLEM — L67.8 ABNORMAL FACIAL HAIR: Status: RESOLVED | Noted: 2021-07-16 | Resolved: 2023-11-16

## 2023-11-16 PROBLEM — D50.0 IRON DEFICIENCY ANEMIA DUE TO CHRONIC BLOOD LOSS: Status: ACTIVE | Noted: 2023-11-16

## 2023-11-16 PROBLEM — R79.89 ELEVATED SERUM CREATININE: Status: RESOLVED | Noted: 2022-09-22 | Resolved: 2023-11-16

## 2023-11-16 PROBLEM — E55.9 VITAMIN D DEFICIENCY: Status: ACTIVE | Noted: 2023-11-16

## 2023-11-16 PROBLEM — G89.18 POST-OP PAIN: Status: RESOLVED | Noted: 2022-10-26 | Resolved: 2023-11-16

## 2023-11-16 PROBLEM — Z82.49 FAMILY HISTORY OF PREMATURE CORONARY ARTERY DISEASE: Status: ACTIVE | Noted: 2023-11-16

## 2023-11-16 LAB — STATUS: NORMAL

## 2023-11-16 PROCEDURE — 99215 OFFICE O/P EST HI 40 MIN: CPT | Performed by: STUDENT IN AN ORGANIZED HEALTH CARE EDUCATION/TRAINING PROGRAM

## 2023-11-16 RX ORDER — LEVOTHYROXINE SODIUM 0.1 MG/1
100 TABLET ORAL DAILY
Qty: 90 TABLET | Refills: 1 | Status: SHIPPED | OUTPATIENT
Start: 2023-11-16 | End: 2024-11-15

## 2023-11-16 RX ORDER — VENLAFAXINE HYDROCHLORIDE 75 MG/1
CAPSULE, EXTENDED RELEASE ORAL
Qty: 90 CAPSULE | Refills: 1 | Status: SHIPPED | OUTPATIENT
Start: 2023-11-16

## 2023-11-16 ASSESSMENT — ENCOUNTER SYMPTOMS
NAUSEA: 0
VOMITING: 0
COUGH: 0
ABDOMINAL PAIN: 0
TROUBLE SWALLOWING: 0
SHORTNESS OF BREATH: 0
NAUSEA: 0
VOICE CHANGE: 0
ABDOMINAL PAIN: 0
VOMITING: 0
DIARRHEA: 0
DIARRHEA: 0
WHEEZING: 0
CONSTIPATION: 0

## 2023-11-16 NOTE — ASSESSMENT & PLAN NOTE
Noted in office during past 2 office visits. Per patient, has a history of whitecoat hypertension. 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 No blood pressure monitor at home; will prescribe today. Recommend checking on occasion and calling with any persistently elevated readings. Patient likely has SVITLANA and we can expect BP to improve once addressed.

## 2023-11-16 NOTE — ASSESSMENT & PLAN NOTE
Brief weight loss counseling provided. Recommended looking into [facility]; discussed program features, CBT approach to weight loss. Discussed pharmacologic options; primarily GLP-1 agonists. Will discuss further at follow up. Will obtain metabolic screening labs and recheck thyroid function.

## 2023-11-16 NOTE — ASSESSMENT & PLAN NOTE
TSH, free T4 are at goal but patient taking higher dose of levothyroxine for past 4 weeks; she requires a refill today. Will prescribe average dose over past 3 months of 100 mcg and repeat thyroid labs in 3 months and adjust dosing as necessary. Patient will call with any issues in the interim. Will obtain previous thyroid imaging; records requested.

## 2023-12-01 DIAGNOSIS — G47.33 OBSTRUCTIVE SLEEP APNEA: Primary | ICD-10-CM

## 2024-01-25 ENCOUNTER — OFFICE VISIT (OUTPATIENT)
Age: 39
End: 2024-01-25

## 2024-01-25 VITALS — BODY MASS INDEX: 40.16 KG/M2 | HEIGHT: 68 IN | WEIGHT: 265 LBS

## 2024-01-25 DIAGNOSIS — M17.11 PATELLOFEMORAL ARTHRITIS OF RIGHT KNEE: Primary | ICD-10-CM

## 2024-01-25 DIAGNOSIS — M54.41 ACUTE BILATERAL LOW BACK PAIN WITH RIGHT-SIDED SCIATICA: ICD-10-CM

## 2024-01-25 NOTE — PROGRESS NOTES
[facility] Orthopedics & Sports Medicine      [facility] PHYSICIANS [facility], [facility]  [facility] ORTHOPAEDICS AND SPORTS MEDICINE  [address]  [place] [address]  Dept: [phone]  Dept Fax: [phone]    Chief Compliant:  No chief complaint on file.       History of Present Illness:  This is a pleasant 39 y.o. female who is here for re-evaluation of right knee pain, previous diagnosed with patellar chondromalacia. Patient received a cortisone injection 9/19/23 which she states helped take away the burning and crepitus type pain for about 3 months. 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 States she also now has right sided sciatica from walking more and limping from her knee. Denies any new injuries or falls. Denies any numbness or tingling.     Physical Exam: Skin intact. TTP suprapatellar region. No medial or lateral joint line tenderness. Suprapatellar pain with full extension. 0-90 degrees of range of motion. Patellofemoral crepitus. Knee ligaments stable to varus and valgus stress at 0 and 30 degrees of flexion. Ambulates with antalgic gait pattern.     Imaging: None obtained today.       Assessment and Plan:    This is a pleasant 39 y.o. female who has right knee patellar chondromalacia. Patient has had relief from cortisone injection in the past and would like to proceed with another injection today. She would also like to start physical therapy both for the knee and her back. External referral for PT provided. Discussed the potential need for a knee replacement in the future but due to her young age, recommend continuing conservative treatment and again encouraged weight loss. After the skin was cleansed with alcohol I injected 80 mg of Depo-Medrol and local anesthetic into the right knee joint.  Cortisone injection risks include infection, hyperglycemia, post injection pain.

## 2024-01-27 ENCOUNTER — HOSPITAL ENCOUNTER (OUTPATIENT)
Dept: SLEEP CENTER | Age: 39
Discharge: HOME | End: 2024-01-29
Payer: MEDICAID

## 2024-01-27 VITALS
HEIGHT: 68 IN | BODY MASS INDEX: 40.16 KG/M2 | RESPIRATION RATE: 12 BRPM | HEART RATE: 65 BPM | WEIGHT: 265 LBS | OXYGEN SATURATION: 96 %

## 2024-01-27 DIAGNOSIS — G47.33 OBSTRUCTIVE SLEEP APNEA: ICD-10-CM

## 2024-01-27 PROCEDURE — 95811 POLYSOM 6/>YRS CPAP 4/> PARM: CPT

## 2024-01-30 LAB — STATUS: NORMAL

## 2024-02-01 DIAGNOSIS — G47.33 OBSTRUCTIVE SLEEP APNEA: Primary | ICD-10-CM

## 2024-02-02 NOTE — PROGRESS NOTES
I contacted the pt and she will call her insurance company to find out what DME company is in network. She stated that she will call back with that information so the order can be faxed to the appropriate facility.

## 2024-02-07 NOTE — PROGRESS NOTES
[facility]       Date of Visit:  2023  Patient Name: Anna Ac   Patient :  1985     CHIEF COMPLAINT:     Anna Ac is a 41 y.o. female who presents today to be evaluated for the following condition(s):  Chief Complaint   Patient presents with    Discuss Labs     Pt would like to review the labs that she had done 11/3/23 - she is concerned about her thyroid, she has been taking two 75mcg daily and believes that is working better for her. Follow-up     Pt was seen at the sleep center on 23 - does not have results yet, she was told the doctor had a lot of studies to get through and they will call her with her results. HISTORY OF PRESENT ILLNESS:      HPI   Patient is presenting today for follow up. Patient is presenting sleep study and laboratory results. Unfortunately, patient's study results are currently pending. She states that she was told by the sleep center that she has at least moderate obstructive sleep apnea. Will await finalized report prior to referral to sleep medicine. Patient's labs reviewed with her today; no concerning findings. Hypothyroidism  Patient reports that for the past month, she has been taking twice her prescribed dose of levothyroxine for a total of 150 mg daily; she is taking more than prescribed as she felt as if her thyroid function was low. She was fatigued and had been experiencing dry skin and brittle hair. Previously, she reports that she had been maintained on 125 mcg daily. Her hypothyroidism is thought to be secondary to Hashimoto's thyroiditis. She reports having had multiple thyroid ultrasounds in the past; states she had multiple \"cysts\" but all thought to be benign. Last thyroid US performed at [facility] in [facility]; records requested today. Depression  Symptoms well controlled with venlafaxine; patient has been stable on this dose for quite some time.   She tolerates venlafaxine well without adverse
29.9

## 2024-02-29 ENCOUNTER — APPOINTMENT (OUTPATIENT)
Dept: GENERAL RADIOLOGY | Facility: CLINIC | Age: 39
End: 2024-02-29
Payer: MEDICAID

## 2024-02-29 ENCOUNTER — HOSPITAL ENCOUNTER (EMERGENCY)
Facility: CLINIC | Age: 39
Discharge: HOME | End: 2024-02-29
Attending: EMERGENCY MEDICINE
Payer: MEDICAID

## 2024-02-29 VITALS
OXYGEN SATURATION: 100 % | RESPIRATION RATE: 16 BRPM | SYSTOLIC BLOOD PRESSURE: 152 MMHG | DIASTOLIC BLOOD PRESSURE: 113 MMHG | HEART RATE: 94 BPM

## 2024-02-29 DIAGNOSIS — M54.10 BACK PAIN WITH RADICULOPATHY: Primary | ICD-10-CM

## 2024-02-29 PROCEDURE — 72100 X-RAY EXAM L-S SPINE 2/3 VWS: CPT

## 2024-02-29 PROCEDURE — 6360000002 HC RX W HCPCS: Performed by: PHYSICIAN ASSISTANT

## 2024-02-29 PROCEDURE — 99284 EMERGENCY DEPT VISIT MOD MDM: CPT

## 2024-02-29 PROCEDURE — 96372 THER/PROPH/DIAG INJ SC/IM: CPT

## 2024-02-29 RX ORDER — NAPROXEN 500 MG/1
500 TABLET ORAL 2 TIMES DAILY PRN
Qty: 20 TABLET | Refills: 0 | Status: SHIPPED | OUTPATIENT
Start: 2024-02-29 | End: 2024-03-10

## 2024-02-29 RX ORDER — KETOROLAC TROMETHAMINE 30 MG/ML
30 INJECTION, SOLUTION INTRAMUSCULAR; INTRAVENOUS ONCE
Status: COMPLETED | OUTPATIENT
Start: 2024-02-29 | End: 2024-02-29

## 2024-02-29 RX ORDER — DEXAMETHASONE SODIUM PHOSPHATE 10 MG/ML
10 INJECTION, SOLUTION INTRAMUSCULAR; INTRAVENOUS ONCE
Status: COMPLETED | OUTPATIENT
Start: 2024-02-29 | End: 2024-02-29

## 2024-02-29 RX ORDER — LIDOCAINE 4 G/G
1 PATCH TOPICAL EVERY 24 HOURS
Qty: 30 PATCH | Refills: 0 | Status: SHIPPED | OUTPATIENT
Start: 2024-02-29 | End: 2024-03-30

## 2024-02-29 RX ORDER — CYCLOBENZAPRINE HCL 10 MG
10 TABLET ORAL NIGHTLY PRN
Qty: 10 TABLET | Refills: 0 | Status: SHIPPED | OUTPATIENT
Start: 2024-02-29 | End: 2024-03-10

## 2024-02-29 RX ADMIN — KETOROLAC TROMETHAMINE 30 MG: 30 INJECTION, SOLUTION INTRAMUSCULAR at 17:25

## 2024-02-29 RX ADMIN — DEXAMETHASONE SODIUM PHOSPHATE 10 MG: 10 INJECTION INTRAMUSCULAR; INTRAVENOUS at 17:25

## 2024-02-29 ASSESSMENT — PAIN - FUNCTIONAL ASSESSMENT: PAIN_FUNCTIONAL_ASSESSMENT: 0-10

## 2024-02-29 ASSESSMENT — PAIN SCALES - GENERAL: PAINLEVEL_OUTOF10: 10

## 2024-02-29 NOTE — ED PROVIDER NOTES
[facility]  eMERGENCY dEPARTMENT eNCOUnter      Pt Name: Tonya Wall  MRN: [id number]  Birthdate 1985  Date of evaluation: 2/29/2024  Provider: Hugo Tejeda PA-C    CHIEF COMPLAINT       Chief Complaint   Patient presents with    Back Pain     With sciatica right side           HISTORY OF PRESENT ILLNESS  (Location/Symptom, Timing/Onset, Context/Setting, Quality, Duration, Modifying Factors, Severity.)   Tonya Wall is a 39 y.o. female who presents to the emergency department complaining of right-sided lower back pain that has been getting worse over the past couple days.  Patient is a , states that she was lifting something light and felt a twinge in her right lower back shooting down her right leg.  She denies any abdominal pain nausea vomiting fevers or chills.  She denies any previous injury to her back.    No loss of bowel or bladder control, no numbness or tingling  denies any fevers, chills, abdominal pain nausea or vomiting    Location/Symptom: Right lumbar back  Quality: Aching  Duration: Persistent  Modifying Factors: Worse with bending and twisting  Severity: Mild    Nursing Notes were reviewed.    REVIEW OF SYSTEMS    (2-9 systems for level 4, 10 or more for level 5)     Review of Systems   Constitutional: Negative.   Respiratory: Negative.   Cardiovascular: Negative.   Gastrointestinal: Negative.   Musculoskeletal: Complaining of back pain  Genitourinary: Negative.   Skin: Negative.   Neurological: Negative.     Except as noted above the remainder of the review of systems was reviewed and negative.       PAST MEDICAL HISTORY         Diagnosis Date    COVID-19     not hospitalized . Symptoms 1 month. Headaches, no smell, and \"confusion\". Now back to normal    Depression     on rx per pcp    Elevated blood pressure reading     pt states at doctor's visits bp is high initially then decreases to 130 systolic    Endometriosis     Fibroid uterus 2018    Hypothyroidism

## 2024-02-29 NOTE — ED PROVIDER NOTES
[facility]    [address] [address]  Phone: [phone]  Emergency Department  Faculty Attestation    I performed a history and physical examination of the patient and discussed management with the mid level provider. I reviewed the mid level provider's note and agree with the documented findings and plan of care. Any areas of disagreement are noted on the chart. I was personally present for the key portions of any procedures. I have documented in the chart those procedures where I was not present during the key portions. I have reviewed the emergency nurses triage note. I agree with the chief complaint, past medical history, past surgical history, allergies, medications, social and family history as documented unless otherwise noted below. Documentation of the HPI, Physical Exam and Medical Decision Making performed by medical students or scribes is based on my personal performance of the HPI, PE and MDM. For Physician Assistant/ Nurse Practitioner cases/documentation I have personally evaluated this patient and have completed at least one if not all key elements of the E/M (history, physical exam, and MDM). Additional findings are as noted.      Primary Care Physician:  Waldo Lema DO    CHIEF COMPLAINT       Chief Complaint   Patient presents with    Back Pain     With sciatica right side       RECENT VITALS:    ,  Pulse: 94, Respirations: 16, BP: (!) 152/113    LABS:  Labs Reviewed - No data to display     XR LUMBAR SPINE (2-3 VIEWS) (Final result)  Result time 02/29/24 18:10:18  Final result by Florence Cates MD (02/29/24 18:10:18)                Impression:    Degenerative changes at L5-S1 without acute abnormality.            Narrative:    EXAMINATION:  3 XRAY VIEWS OF THE LUMBAR SPINE    2/29/2024 5:39 pm    COMPARISON:  Correlation with CT on 06/17/2021    HISTORY:  Acute atraumatic lower back and right leg pain.    FINDINGS:  No acute fracture.  No malalignment.

## 2024-03-11 ENCOUNTER — OFFICE VISIT (OUTPATIENT)
Dept: PRIMARY CARE CLINIC | Age: 39
End: 2024-03-11
Payer: MEDICAID

## 2024-03-11 VITALS
WEIGHT: 256 LBS | HEIGHT: 68 IN | SYSTOLIC BLOOD PRESSURE: 142 MMHG | BODY MASS INDEX: 38.8 KG/M2 | DIASTOLIC BLOOD PRESSURE: 96 MMHG | OXYGEN SATURATION: 99 % | HEART RATE: 90 BPM

## 2024-03-11 DIAGNOSIS — M54.41 CHRONIC RIGHT-SIDED LOW BACK PAIN WITH RIGHT-SIDED SCIATICA: Primary | ICD-10-CM

## 2024-03-11 DIAGNOSIS — G89.29 CHRONIC RIGHT-SIDED LOW BACK PAIN WITH RIGHT-SIDED SCIATICA: Primary | ICD-10-CM

## 2024-03-11 DIAGNOSIS — M62.830 LUMBAR PARASPINAL MUSCLE SPASM: ICD-10-CM

## 2024-03-11 PROCEDURE — 4004F PT TOBACCO SCREEN RCVD TLK: CPT | Performed by: STUDENT IN AN ORGANIZED HEALTH CARE EDUCATION/TRAINING PROGRAM

## 2024-03-11 PROCEDURE — 99214 OFFICE O/P EST MOD 30 MIN: CPT | Performed by: STUDENT IN AN ORGANIZED HEALTH CARE EDUCATION/TRAINING PROGRAM

## 2024-03-11 PROCEDURE — G8417 CALC BMI ABV UP PARAM F/U: HCPCS | Performed by: STUDENT IN AN ORGANIZED HEALTH CARE EDUCATION/TRAINING PROGRAM

## 2024-03-11 PROCEDURE — 96372 THER/PROPH/DIAG INJ SC/IM: CPT | Performed by: STUDENT IN AN ORGANIZED HEALTH CARE EDUCATION/TRAINING PROGRAM

## 2024-03-11 PROCEDURE — G8427 DOCREV CUR MEDS BY ELIG CLIN: HCPCS | Performed by: STUDENT IN AN ORGANIZED HEALTH CARE EDUCATION/TRAINING PROGRAM

## 2024-03-11 PROCEDURE — G8482 FLU IMMUNIZE ORDER/ADMIN: HCPCS | Performed by: STUDENT IN AN ORGANIZED HEALTH CARE EDUCATION/TRAINING PROGRAM

## 2024-03-11 RX ORDER — KETOROLAC TROMETHAMINE 30 MG/ML
60 INJECTION, SOLUTION INTRAMUSCULAR; INTRAVENOUS ONCE
Status: COMPLETED | OUTPATIENT
Start: 2024-03-11 | End: 2024-03-11

## 2024-03-11 RX ORDER — NAPROXEN 500 MG/1
500 TABLET ORAL 2 TIMES DAILY PRN
Qty: 60 TABLET | Refills: 1 | Status: SHIPPED | OUTPATIENT
Start: 2024-03-11 | End: 2024-05-10

## 2024-03-11 RX ORDER — CYCLOBENZAPRINE HCL 10 MG
10 TABLET ORAL
Qty: 30 TABLET | Refills: 0 | Status: SHIPPED | OUTPATIENT
Start: 2024-03-11 | End: 2024-04-10

## 2024-03-11 RX ORDER — ACETAMINOPHEN 500 MG
1000 TABLET ORAL 3 TIMES DAILY PRN
Qty: 180 TABLET | Refills: 5 | COMMUNITY
Start: 2024-03-11

## 2024-03-11 RX ORDER — LIDOCAINE 50 MG/G
1 PATCH TOPICAL EVERY 24 HOURS
Qty: 10 PATCH | Refills: 0 | Status: SHIPPED | OUTPATIENT
Start: 2024-03-11 | End: 2024-03-21

## 2024-03-11 RX ADMIN — KETOROLAC TROMETHAMINE 60 MG: 30 INJECTION, SOLUTION INTRAMUSCULAR; INTRAVENOUS at 12:33

## 2024-03-11 ASSESSMENT — ENCOUNTER SYMPTOMS
ABDOMINAL PAIN: 0
NAUSEA: 0
BACK PAIN: 1
VOMITING: 0

## 2024-03-11 NOTE — PROGRESS NOTES
Affect: Mood normal.         Behavior: Behavior normal.         Thought Content: Thought content normal.         Judgment: Judgment normal.         ASSESSMENT/PLAN     1. Chronic right-sided low back pain with right-sided sciatica  Assessment & Plan:  Acutely exacerbated; suspect herniated disc.  Will treat as below and follow up next week; consider gentle OMT, physical therapy referral if improved at follow up.  If not improving, will consider MRI.  Orders:  -     acetaminophen (TYLENOL) 500 MG tablet; Take 2 tablets by mouth 3 times daily as needed for Pain, Disp-180 tablet, R-5OTC  -     naproxen (NAPROSYN) 500 MG tablet; Take 1 tablet by mouth 2 times daily as needed for Pain (with meals), Disp-60 tablet, R-1Normal  -     lidocaine (LIDODERM) 5 %; Place 1 patch onto the skin every 24 hours for 10 days 12 hours on, 12 hours off., Disp-10 patch, R-0Normal  2. Lumbar paraspinal muscle spasm  Assessment & Plan:  Sedative effects of muscle relaxors reviewed with patient today; instructed to avoid driving, drinking ETOH while taking.  Gentle stretching as able.  Orders:  -     cyclobenzaprine (FLEXERIL) 10 MG tablet; Take 1 tablet by mouth nightly as needed for Muscle spasms, Disp-30 tablet, R-0Normal         Medications Discontinued During This Encounter   Medication Reason    lidocaine 4 % external patch DOSE ADJUSTMENT    naproxen (NAPROSYN) 500 MG tablet REORDER         Return in about 1 week (around 3/18/2024) for Low back pain.    Patient was encouraged to call the office with any worsening/changing symptoms or any other concerns prior to scheduled follow up.    All questions were answered to the patient's satisfaction.  Patient verbalized understanding and agreement with the plan.    Any history taking or documentation performed by MA has been independently reviewed and confirmed by myself.    Electronically signed by Waldo Lema DO on 3/11/2024 at 12:25 PM

## 2024-03-11 NOTE — ASSESSMENT & PLAN NOTE
Acutely exacerbated; suspect herniated disc.  Will treat as below and follow up next week; consider gentle OMT, physical therapy referral if improved at follow up.  If not improving, will consider MRI.

## 2024-03-11 NOTE — ASSESSMENT & PLAN NOTE
Sedative effects of muscle relaxors reviewed with patient today; instructed to avoid driving, drinking ETOH while taking.  Gentle stretching as able.

## 2024-03-15 ENCOUNTER — TELEPHONE (OUTPATIENT)
Dept: PRIMARY CARE CLINIC | Age: 39
End: 2024-03-15

## 2024-03-15 ASSESSMENT — PATIENT HEALTH QUESTIONNAIRE - PHQ9
2. FEELING DOWN, DEPRESSED OR HOPELESS: NOT AT ALL
6. FEELING BAD ABOUT YOURSELF - OR THAT YOU ARE A FAILURE OR HAVE LET YOURSELF OR YOUR FAMILY DOWN: NOT AT ALL
2. FEELING DOWN, DEPRESSED OR HOPELESS: NOT AT ALL
SUM OF ALL RESPONSES TO PHQ QUESTIONS 1-9: 2
3. TROUBLE FALLING OR STAYING ASLEEP: SEVERAL DAYS
5. POOR APPETITE OR OVEREATING: NOT AT ALL
10. IF YOU CHECKED OFF ANY PROBLEMS, HOW DIFFICULT HAVE THESE PROBLEMS MADE IT FOR YOU TO DO YOUR WORK, TAKE CARE OF THINGS AT HOME, OR GET ALONG WITH OTHER PEOPLE: NOT DIFFICULT AT ALL
4. FEELING TIRED OR HAVING LITTLE ENERGY: SEVERAL DAYS
SUM OF ALL RESPONSES TO PHQ9 QUESTIONS 1 & 2: 0
9. THOUGHTS THAT YOU WOULD BE BETTER OFF DEAD, OR OF HURTING YOURSELF: NOT AT ALL
SUM OF ALL RESPONSES TO PHQ QUESTIONS 1-9: 2
7. TROUBLE CONCENTRATING ON THINGS, SUCH AS READING THE NEWSPAPER OR WATCHING TELEVISION: NOT AT ALL
SUM OF ALL RESPONSES TO PHQ QUESTIONS 1-9: 2
1. LITTLE INTEREST OR PLEASURE IN DOING THINGS: NOT AT ALL
1. LITTLE INTEREST OR PLEASURE IN DOING THINGS: NOT AT ALL
8. MOVING OR SPEAKING SO SLOWLY THAT OTHER PEOPLE COULD HAVE NOTICED. OR THE OPPOSITE, BEING SO FIGETY OR RESTLESS THAT YOU HAVE BEEN MOVING AROUND A LOT MORE THAN USUAL: NOT AT ALL
5. POOR APPETITE OR OVEREATING: NOT AT ALL
8. MOVING OR SPEAKING SO SLOWLY THAT OTHER PEOPLE COULD HAVE NOTICED. OR THE OPPOSITE - BEING SO FIDGETY OR RESTLESS THAT YOU HAVE BEEN MOVING AROUND A LOT MORE THAN USUAL: NOT AT ALL
9. THOUGHTS THAT YOU WOULD BE BETTER OFF DEAD, OR OF HURTING YOURSELF: NOT AT ALL
SUM OF ALL RESPONSES TO PHQ QUESTIONS 1-9: 2
10. IF YOU CHECKED OFF ANY PROBLEMS, HOW DIFFICULT HAVE THESE PROBLEMS MADE IT FOR YOU TO DO YOUR WORK, TAKE CARE OF THINGS AT HOME, OR GET ALONG WITH OTHER PEOPLE: NOT DIFFICULT AT ALL
SUM OF ALL RESPONSES TO PHQ QUESTIONS 1-9: 2
7. TROUBLE CONCENTRATING ON THINGS, SUCH AS READING THE NEWSPAPER OR WATCHING TELEVISION: NOT AT ALL
6. FEELING BAD ABOUT YOURSELF - OR THAT YOU ARE A FAILURE OR HAVE LET YOURSELF OR YOUR FAMILY DOWN: NOT AT ALL
3. TROUBLE FALLING OR STAYING ASLEEP: SEVERAL DAYS
4. FEELING TIRED OR HAVING LITTLE ENERGY: SEVERAL DAYS

## 2024-03-15 NOTE — TELEPHONE ENCOUNTER
The patient called to make sure that her PCP is able to see the XR she recently had completed.     Writer advised the patient that the results were in the chart, then the patient asked if her PCP would be able to see the images as she has these printed out if not.  Writer did advised the patient the images are not visible through her chart.    Patient is asking if PCP would like the copy of the images she has to review before her appointment on 03/18/2024.    Please advise.

## 2024-03-18 ENCOUNTER — HOSPITAL ENCOUNTER (OUTPATIENT)
Age: 39
Setting detail: SPECIMEN
Discharge: HOME | End: 2024-03-18

## 2024-03-18 ENCOUNTER — OFFICE VISIT (OUTPATIENT)
Dept: PRIMARY CARE CLINIC | Age: 39
End: 2024-03-18
Payer: MEDICAID

## 2024-03-18 VITALS
HEIGHT: 68 IN | HEART RATE: 94 BPM | DIASTOLIC BLOOD PRESSURE: 88 MMHG | BODY MASS INDEX: 38.65 KG/M2 | WEIGHT: 255 LBS | SYSTOLIC BLOOD PRESSURE: 136 MMHG | OXYGEN SATURATION: 99 %

## 2024-03-18 DIAGNOSIS — G89.29 CHRONIC RIGHT-SIDED LOW BACK PAIN WITH RIGHT-SIDED SCIATICA: Primary | ICD-10-CM

## 2024-03-18 DIAGNOSIS — M62.830 LUMBAR PARASPINAL MUSCLE SPASM: ICD-10-CM

## 2024-03-18 DIAGNOSIS — E03.9 ACQUIRED HYPOTHYROIDISM: ICD-10-CM

## 2024-03-18 DIAGNOSIS — M54.41 CHRONIC RIGHT-SIDED LOW BACK PAIN WITH RIGHT-SIDED SCIATICA: Primary | ICD-10-CM

## 2024-03-18 DIAGNOSIS — E04.1 THYROID NODULE: ICD-10-CM

## 2024-03-18 PROBLEM — D50.0 IRON DEFICIENCY ANEMIA DUE TO CHRONIC BLOOD LOSS: Status: RESOLVED | Noted: 2023-11-16 | Resolved: 2024-03-18

## 2024-03-18 PROBLEM — E55.9 VITAMIN D DEFICIENCY: Status: RESOLVED | Noted: 2023-11-16 | Resolved: 2024-03-18

## 2024-03-18 LAB
T4 FREE SERPL-MCNC: 1.2 NG/DL (ref 0.93–1.7)
TSH SERPL DL<=0.05 MIU/L-ACNC: 0.85 UIU/ML (ref 0.27–4.2)

## 2024-03-18 PROCEDURE — 99214 OFFICE O/P EST MOD 30 MIN: CPT | Performed by: STUDENT IN AN ORGANIZED HEALTH CARE EDUCATION/TRAINING PROGRAM

## 2024-03-18 PROCEDURE — G8417 CALC BMI ABV UP PARAM F/U: HCPCS | Performed by: STUDENT IN AN ORGANIZED HEALTH CARE EDUCATION/TRAINING PROGRAM

## 2024-03-18 PROCEDURE — G8427 DOCREV CUR MEDS BY ELIG CLIN: HCPCS | Performed by: STUDENT IN AN ORGANIZED HEALTH CARE EDUCATION/TRAINING PROGRAM

## 2024-03-18 PROCEDURE — 4004F PT TOBACCO SCREEN RCVD TLK: CPT | Performed by: STUDENT IN AN ORGANIZED HEALTH CARE EDUCATION/TRAINING PROGRAM

## 2024-03-18 PROCEDURE — G8482 FLU IMMUNIZE ORDER/ADMIN: HCPCS | Performed by: STUDENT IN AN ORGANIZED HEALTH CARE EDUCATION/TRAINING PROGRAM

## 2024-04-03 ENCOUNTER — HOSPITAL ENCOUNTER (OUTPATIENT)
Dept: ULTRASOUND IMAGING | Age: 39
Discharge: HOME | End: 2024-04-05
Payer: MEDICAID

## 2024-04-03 DIAGNOSIS — E04.1 THYROID NODULE: ICD-10-CM

## 2024-04-03 DIAGNOSIS — E03.9 ACQUIRED HYPOTHYROIDISM: ICD-10-CM

## 2024-04-03 PROCEDURE — 76536 US EXAM OF HEAD AND NECK: CPT

## 2024-04-08 ENCOUNTER — PROCEDURE VISIT (OUTPATIENT)
Dept: PRIMARY CARE CLINIC | Age: 39
End: 2024-04-08
Payer: MEDICAID

## 2024-04-08 VITALS
OXYGEN SATURATION: 99 % | SYSTOLIC BLOOD PRESSURE: 136 MMHG | HEIGHT: 68 IN | HEART RATE: 84 BPM | BODY MASS INDEX: 38.95 KG/M2 | WEIGHT: 257 LBS | DIASTOLIC BLOOD PRESSURE: 88 MMHG

## 2024-04-08 DIAGNOSIS — E03.9 ACQUIRED HYPOTHYROIDISM: ICD-10-CM

## 2024-04-08 DIAGNOSIS — E66.01 CLASS 3 SEVERE OBESITY DUE TO EXCESS CALORIES WITH SERIOUS COMORBIDITY AND BODY MASS INDEX (BMI) OF 40.0 TO 44.9 IN ADULT (HCC): ICD-10-CM

## 2024-04-08 DIAGNOSIS — E04.1 THYROID NODULE: ICD-10-CM

## 2024-04-08 DIAGNOSIS — G89.29 CHRONIC RIGHT-SIDED LOW BACK PAIN WITH RIGHT-SIDED SCIATICA: Primary | ICD-10-CM

## 2024-04-08 DIAGNOSIS — M54.41 CHRONIC RIGHT-SIDED LOW BACK PAIN WITH RIGHT-SIDED SCIATICA: Primary | ICD-10-CM

## 2024-04-08 DIAGNOSIS — F32.A DEPRESSION, UNSPECIFIED DEPRESSION TYPE: ICD-10-CM

## 2024-04-08 DIAGNOSIS — M62.830 LUMBAR PARASPINAL MUSCLE SPASM: ICD-10-CM

## 2024-04-08 PROCEDURE — 4004F PT TOBACCO SCREEN RCVD TLK: CPT | Performed by: STUDENT IN AN ORGANIZED HEALTH CARE EDUCATION/TRAINING PROGRAM

## 2024-04-08 PROCEDURE — 99214 OFFICE O/P EST MOD 30 MIN: CPT | Performed by: STUDENT IN AN ORGANIZED HEALTH CARE EDUCATION/TRAINING PROGRAM

## 2024-04-08 PROCEDURE — G8427 DOCREV CUR MEDS BY ELIG CLIN: HCPCS | Performed by: STUDENT IN AN ORGANIZED HEALTH CARE EDUCATION/TRAINING PROGRAM

## 2024-04-08 PROCEDURE — G8417 CALC BMI ABV UP PARAM F/U: HCPCS | Performed by: STUDENT IN AN ORGANIZED HEALTH CARE EDUCATION/TRAINING PROGRAM

## 2024-04-08 RX ORDER — VENLAFAXINE HYDROCHLORIDE 150 MG/1
CAPSULE, EXTENDED RELEASE ORAL
Qty: 30 CAPSULE | Refills: 2 | Status: SHIPPED | OUTPATIENT
Start: 2024-04-08

## 2024-04-08 RX ORDER — TIZANIDINE 2 MG/1
2 TABLET ORAL 3 TIMES DAILY PRN
Qty: 30 TABLET | Refills: 1 | Status: SHIPPED | OUTPATIENT
Start: 2024-04-08

## 2024-04-08 NOTE — PROGRESS NOTES
physically active as able without aggravating low back pain.    Medications Discontinued During This Encounter   Medication Reason    cyclobenzaprine (FLEXERIL) 10 MG tablet Side effects    venlafaxine (EFFEXOR XR) 75 MG extended release capsule REORDER         Return in about 4 weeks (around 5/6/2024) for low back pain; weight loss.    Patient was encouraged to call the office with any worsening/changing symptoms or any other concerns prior to scheduled follow up.    All questions were answered to the patient's satisfaction.  Patient verbalized understanding and agreement with the plan.    On this date 4/8/2024 I have spent 38 minutes reviewing previous notes, test results and face to face with the patient discussing the diagnosis and importance of compliance with the treatment plan as well as documenting on the day of the visit.    Any history taking or documentation performed by MA has been independently reviewed and confirmed by myself.    Electronically signed by Waldo Lema DO on 4/14/2024 at 1:32 PM

## 2024-04-14 ENCOUNTER — HOSPITAL ENCOUNTER (EMERGENCY)
Age: 39
Discharge: HOME | End: 2024-04-14
Attending: EMERGENCY MEDICINE
Payer: MEDICAID

## 2024-04-14 ENCOUNTER — APPOINTMENT (OUTPATIENT)
Dept: MRI IMAGING | Age: 39
End: 2024-04-14
Payer: MEDICAID

## 2024-04-14 VITALS
TEMPERATURE: 97.2 F | HEART RATE: 92 BPM | WEIGHT: 254.85 LBS | RESPIRATION RATE: 20 BRPM | SYSTOLIC BLOOD PRESSURE: 145 MMHG | OXYGEN SATURATION: 100 % | BODY MASS INDEX: 38.75 KG/M2 | DIASTOLIC BLOOD PRESSURE: 101 MMHG

## 2024-04-14 DIAGNOSIS — M54.31 SCIATICA OF RIGHT SIDE: Primary | ICD-10-CM

## 2024-04-14 LAB
ANION GAP SERPL CALCULATED.3IONS-SCNC: 10 MMOL/L (ref 9–16)
BASOPHILS # BLD: 0.05 K/UL (ref 0–0.2)
BASOPHILS NFR BLD: 1 % (ref 0–2)
BUN SERPL-MCNC: 15 MG/DL (ref 6–20)
CALCIUM SERPL-MCNC: 9.4 MG/DL (ref 8.6–10.4)
CHLORIDE SERPL-SCNC: 102 MMOL/L (ref 98–107)
CO2 SERPL-SCNC: 27 MMOL/L (ref 20–31)
CREAT SERPL-MCNC: 0.8 MG/DL (ref 0.5–0.9)
CRP SERPL HS-MCNC: <3 MG/L (ref 0–5)
EOSINOPHIL # BLD: 0.19 K/UL (ref 0–0.44)
EOSINOPHILS RELATIVE PERCENT: 2 % (ref 1–4)
ERYTHROCYTE [DISTWIDTH] IN BLOOD BY AUTOMATED COUNT: 12.6 % (ref 11.8–14.4)
ERYTHROCYTE [SEDIMENTATION RATE] IN BLOOD BY PHOTOMETRIC METHOD: 3 MM/HR (ref 0–20)
GFR SERPL CREATININE-BSD FRML MDRD: >90 ML/MIN/1.73M2
GLUCOSE SERPL-MCNC: 93 MG/DL (ref 74–99)
HCT VFR BLD AUTO: 37.8 % (ref 36.3–47.1)
HGB BLD-MCNC: 12.4 G/DL (ref 11.9–15.1)
IMM GRANULOCYTES # BLD AUTO: 0.03 K/UL (ref 0–0.3)
IMM GRANULOCYTES NFR BLD: 0 %
LYMPHOCYTES NFR BLD: 1.78 K/UL (ref 1.1–3.7)
LYMPHOCYTES RELATIVE PERCENT: 22 % (ref 24–43)
MCH RBC QN AUTO: 28.8 PG (ref 25.2–33.5)
MCHC RBC AUTO-ENTMCNC: 32.8 G/DL (ref 28.4–34.8)
MCV RBC AUTO: 87.9 FL (ref 82.6–102.9)
MONOCYTES NFR BLD: 0.54 K/UL (ref 0.1–1.2)
MONOCYTES NFR BLD: 7 % (ref 3–12)
NEUTROPHILS NFR BLD: 68 % (ref 36–65)
NEUTS SEG NFR BLD: 5.36 K/UL (ref 1.5–8.1)
NRBC BLD-RTO: 0 PER 100 WBC
PLATELET # BLD AUTO: 321 K/UL (ref 138–453)
PMV BLD AUTO: 9.8 FL (ref 8.1–13.5)
POTASSIUM SERPL-SCNC: 3.9 MMOL/L (ref 3.7–5.3)
RBC # BLD AUTO: 4.3 M/UL (ref 3.95–5.11)
SODIUM SERPL-SCNC: 139 MMOL/L (ref 136–145)
WBC OTHER # BLD: 8 K/UL (ref 3.5–11.3)

## 2024-04-14 PROCEDURE — 96374 THER/PROPH/DIAG INJ IV PUSH: CPT

## 2024-04-14 PROCEDURE — 86140 C-REACTIVE PROTEIN: CPT

## 2024-04-14 PROCEDURE — 80048 BASIC METABOLIC PNL TOTAL CA: CPT

## 2024-04-14 PROCEDURE — 96372 THER/PROPH/DIAG INJ SC/IM: CPT

## 2024-04-14 PROCEDURE — 85652 RBC SED RATE AUTOMATED: CPT

## 2024-04-14 PROCEDURE — 99284 EMERGENCY DEPT VISIT MOD MDM: CPT

## 2024-04-14 PROCEDURE — 85025 COMPLETE CBC W/AUTO DIFF WBC: CPT

## 2024-04-14 PROCEDURE — 6370000000 HC RX 637 (ALT 250 FOR IP)

## 2024-04-14 PROCEDURE — 6360000002 HC RX W HCPCS

## 2024-04-14 PROCEDURE — 72148 MRI LUMBAR SPINE W/O DYE: CPT

## 2024-04-14 RX ORDER — KETOROLAC TROMETHAMINE 15 MG/ML
15 INJECTION, SOLUTION INTRAMUSCULAR; INTRAVENOUS ONCE
Status: COMPLETED | OUTPATIENT
Start: 2024-04-14 | End: 2024-04-14

## 2024-04-14 RX ORDER — LIDOCAINE 4 G/G
1 PATCH TOPICAL DAILY
Qty: 30 PATCH | Refills: 0 | Status: SHIPPED | OUTPATIENT
Start: 2024-04-14 | End: 2024-05-14

## 2024-04-14 RX ORDER — IBUPROFEN 200 MG
600 TABLET ORAL EVERY 8 HOURS PRN
Qty: 30 TABLET | Refills: 0 | Status: SHIPPED | OUTPATIENT
Start: 2024-04-14 | End: 2024-04-21

## 2024-04-14 RX ORDER — ORPHENADRINE CITRATE 30 MG/ML
60 INJECTION INTRAMUSCULAR; INTRAVENOUS ONCE
Status: COMPLETED | OUTPATIENT
Start: 2024-04-14 | End: 2024-04-14

## 2024-04-14 RX ORDER — LIDOCAINE 4 G/G
1 PATCH TOPICAL ONCE
Status: DISCONTINUED | OUTPATIENT
Start: 2024-04-14 | End: 2024-04-14 | Stop reason: HOSPADM

## 2024-04-14 RX ADMIN — ORPHENADRINE CITRATE 60 MG: 60 INJECTION INTRAMUSCULAR; INTRAVENOUS at 11:17

## 2024-04-14 RX ADMIN — KETOROLAC TROMETHAMINE 15 MG: 15 INJECTION, SOLUTION INTRAMUSCULAR; INTRAVENOUS at 11:16

## 2024-04-14 ASSESSMENT — PAIN DESCRIPTION - ORIENTATION: ORIENTATION: LOWER

## 2024-04-14 ASSESSMENT — PAIN SCALES - GENERAL
PAINLEVEL_OUTOF10: 7
PAINLEVEL_OUTOF10: 9

## 2024-04-14 ASSESSMENT — PAIN - FUNCTIONAL ASSESSMENT: PAIN_FUNCTIONAL_ASSESSMENT: 0-10

## 2024-04-14 ASSESSMENT — PAIN DESCRIPTION - LOCATION: LOCATION: BACK

## 2024-04-14 ASSESSMENT — PAIN DESCRIPTION - DESCRIPTORS: DESCRIPTORS: SPASM

## 2024-04-14 ASSESSMENT — PAIN DESCRIPTION - PAIN TYPE: TYPE: CHRONIC PAIN

## 2024-04-14 NOTE — ED NOTES
Patient ambulatory with steady gait. Patient denies numbness, tingling, weakness during time of ambulation.

## 2024-04-14 NOTE — DISCHARGE INSTRUCTIONS
You were seen for evaluation of worsening low back pain.  MRI in the emergency department showed mild spinal stenosis on your left lumbar spine.  The back pain that you are having is likely due to sciatica and muscle spasms.  You will be discharged home with ibuprofen and lidocaine patches that you can use at home for pain.  You can continue to use your home muscle relaxer.  You need to follow-up outpatient with a primary care doctor soon as possible for reevaluation.  They may consider enrolling you in physical therapy or seeing a spine specialist for further management.  Return the emergency department for any worsening back pain, inability to walk, fevers, numbness or weakness, other new or concerning symptoms.

## 2024-04-14 NOTE — ED NOTES
Pt to ed with family from home.   Pt c/o back pain, right knee pain. Pt states pain has been ongoing for quite some time, takes pain medication and muscles relaxer. Pt states she has MRI scheduled for 4/22/24.   Pt states pain is typically isolated to the right lumbar, today it radiates across the back, down to the right leg with numbness. Pt states she has had several episodes of urinating on herself. Pt states she at times does not get the sensation she has to urinate.   Pt states she has muscle spasms and when she does have them they radiating across her back and wrap around to her abdomen and groin area.   Pt states no pain unless her muscles spasm.

## 2024-04-14 NOTE — ED PROVIDER NOTES
[facility] ED  eMERGENCY dEPARTMENT eNCOUnter   Attending Attestation     Pt Name: Tonya Wall  MRN: [id number]  Birthdate 1985  Date of evaluation: 4/14/24       Tonya Wall is a 39 y.o. female who presents with Back Pain (Lower back pain with intermittent numbness \"gows across my back or down my leg\"/Pt sts Hx s/s)      1:17 PM EDT      History: Patient is with acute on chronic back pain.  Patient says the pain is worse than usual it is different than usual she says it goes down the leg on the right side.  Patient says that she has been losing control of her bladder and urinating on herself.  Patient says that she has been attempting to empty her bladder as frequently as possible because if she does not she will urinate on herself.  Patient has no stool issues.  Patient says that she is weak in the right lower extremity.  Denies perineal paresthesias.    Exam: Heart rate and rhythm are regular.  Lungs are clear to auscultation bilaterally.  Abdomen is soft, nontender.  Patient has significant pain over the midline of the lumbar spine that travels down the back and laterally to the right buttocks.    Plan for labs, CRP, MRI lumbar spine, will consult neurosurgery if there is concern on MRI.  Will treat symptoms.  Primary concern would be for early cauda equina versus infection less likely.    I performed a history and physical examination of the patient and discussed management with the resident. I reviewed the resident’s note and agree with the documented findings and plan of care. Any areas of disagreement are noted on the chart. I was personally present for the key portions of any procedures. I have documented in the chart those procedures where I was not present during the key portions. I have personally reviewed all images and agree with the resident's interpretation. I have reviewed the emergency nurses triage note. I agree with the chief complaint, past medical history, past 
     INITIAL VITALS:   BP (!) 145/101   Pulse 92   Temp 97.2 °F (36.2 °C) (Oral)   Resp 20   Wt 115.6 kg (254 lb 13.6 oz)   LMP  (LMP Unknown)   SpO2 100%   BMI 38.75 kg/m²     Physical Exam  Vitals reviewed.   Constitutional:       General: She is in acute distress.      Appearance: She is not ill-appearing or toxic-appearing.   HENT:      Head: Normocephalic and atraumatic.   Cardiovascular:      Rate and Rhythm: Normal rate and regular rhythm.   Pulmonary:      Effort: Pulmonary effort is normal.      Breath sounds: Normal breath sounds.   Abdominal:      Palpations: Abdomen is soft.      Tenderness: There is no abdominal tenderness.   Musculoskeletal:      Cervical back: No tenderness.      Comments: Significant lumbar pain to palpation, no stepoff or deformity, pain most significant in the right lumbar paraspinal musculature, no overlying skin changes, sensation intact distally, 5/5 strength in distal LE bilaterally, positive strength leg raise    Skin:     General: Skin is warm and dry.   Neurological:      General: No focal deficit present.      Mental Status: She is alert and oriented to person, place, and time.           DDX/DIAGNOSTIC RESULTS / EMERGENCY DEPARTMENT COURSE / MDM     Medical Decision Making  39 yof presents with worsening right sided back pain with numbness and cramping radiating down her right leg. Associated incontinence of bladder and difficulty walking. No new injury. No fevers or hx IVDU. Patient hypertensive upon arrival. Vitals otherwise WNL. On exam patient is anxious with significant pain just lateral to the lumbar spine.    DDX: spinal cord impingement, herniated disc, osteomyelitis, frracture, epidural abscess, sciatica, paraspinal muscle spasm. Plan for pain control, MRI    Amount and/or Complexity of Data Reviewed  Labs: ordered. Decision-making details documented in ED Course.  Radiology: ordered.    Risk  OTC drugs.  Prescription drug management.          EMERGENCY

## 2024-04-15 ENCOUNTER — TELEPHONE (OUTPATIENT)
Age: 39
End: 2024-04-15

## 2024-04-15 NOTE — TELEPHONE ENCOUNTER
Patient called and would like a return call from Lindsey.  She has a lot of questions to ask.  Please return her call.  Thanks.

## 2024-04-16 ENCOUNTER — OFFICE VISIT (OUTPATIENT)
Dept: PRIMARY CARE CLINIC | Age: 39
End: 2024-04-16
Payer: MEDICAID

## 2024-04-16 ENCOUNTER — OFFICE VISIT (OUTPATIENT)
Age: 39
End: 2024-04-16
Payer: MEDICAID

## 2024-04-16 VITALS
HEART RATE: 90 BPM | OXYGEN SATURATION: 99 % | BODY MASS INDEX: 38.95 KG/M2 | HEIGHT: 68 IN | DIASTOLIC BLOOD PRESSURE: 98 MMHG | WEIGHT: 257 LBS | SYSTOLIC BLOOD PRESSURE: 144 MMHG

## 2024-04-16 VITALS — BODY MASS INDEX: 38.49 KG/M2 | HEIGHT: 68 IN | WEIGHT: 254 LBS

## 2024-04-16 DIAGNOSIS — M54.31 RIGHT SIDED SCIATICA: ICD-10-CM

## 2024-04-16 DIAGNOSIS — M25.561 RIGHT KNEE PAIN, UNSPECIFIED CHRONICITY: ICD-10-CM

## 2024-04-16 DIAGNOSIS — M17.11 PATELLOFEMORAL ARTHRITIS OF RIGHT KNEE: ICD-10-CM

## 2024-04-16 DIAGNOSIS — M54.41 CHRONIC RIGHT-SIDED LOW BACK PAIN WITH RIGHT-SIDED SCIATICA: Primary | ICD-10-CM

## 2024-04-16 DIAGNOSIS — N39.3 URINARY, INCONTINENCE, STRESS FEMALE: ICD-10-CM

## 2024-04-16 DIAGNOSIS — M62.830 LUMBAR PARASPINAL MUSCLE SPASM: ICD-10-CM

## 2024-04-16 DIAGNOSIS — G89.29 CHRONIC RIGHT-SIDED LOW BACK PAIN WITH RIGHT-SIDED SCIATICA: Primary | ICD-10-CM

## 2024-04-16 DIAGNOSIS — M54.41 ACUTE BILATERAL LOW BACK PAIN WITH RIGHT-SIDED SCIATICA: Primary | ICD-10-CM

## 2024-04-16 PROCEDURE — G8417 CALC BMI ABV UP PARAM F/U: HCPCS | Performed by: NURSE PRACTITIONER

## 2024-04-16 PROCEDURE — G8417 CALC BMI ABV UP PARAM F/U: HCPCS | Performed by: STUDENT IN AN ORGANIZED HEALTH CARE EDUCATION/TRAINING PROGRAM

## 2024-04-16 PROCEDURE — 4004F PT TOBACCO SCREEN RCVD TLK: CPT | Performed by: NURSE PRACTITIONER

## 2024-04-16 PROCEDURE — 4004F PT TOBACCO SCREEN RCVD TLK: CPT | Performed by: STUDENT IN AN ORGANIZED HEALTH CARE EDUCATION/TRAINING PROGRAM

## 2024-04-16 PROCEDURE — 99214 OFFICE O/P EST MOD 30 MIN: CPT | Performed by: STUDENT IN AN ORGANIZED HEALTH CARE EDUCATION/TRAINING PROGRAM

## 2024-04-16 PROCEDURE — G8428 CUR MEDS NOT DOCUMENT: HCPCS | Performed by: STUDENT IN AN ORGANIZED HEALTH CARE EDUCATION/TRAINING PROGRAM

## 2024-04-16 PROCEDURE — G8427 DOCREV CUR MEDS BY ELIG CLIN: HCPCS | Performed by: NURSE PRACTITIONER

## 2024-04-16 PROCEDURE — 99213 OFFICE O/P EST LOW 20 MIN: CPT | Performed by: NURSE PRACTITIONER

## 2024-04-16 RX ORDER — GABAPENTIN 300 MG/1
300 CAPSULE ORAL 3 TIMES DAILY
Qty: 90 CAPSULE | Refills: 2 | Status: SHIPPED | OUTPATIENT
Start: 2024-04-16 | End: 2024-05-06 | Stop reason: SDUPTHER

## 2024-04-16 NOTE — PROGRESS NOTES
[facility]      [facility] PHYSICIANS [facility]  [facility]  [address]  [place] [address]  Dept: [phone]  Dept Fax: [phone]    Chief Compliant:  Chief Complaint   Patient presents with    Knee Pain     R knee        History of Present Illness:  This is a pleasant 39 y.o. female who is here for re-evaluation of right knee pain. To recap, she has had pain in the right knee cap for about 18 months. She was previously diagnosed with patellar chondromalacia and has received cortisone injections with some relief. The last injection however only provided a few weeks of relief and never took the pain away 100%. She complains of constant burning pain behind her knee cap as well as new onset muscle tremoring in the quad. She continues to have right sided sciatica that shoots down the leg several times per week. She notes decreased range of motion and weakness in the right leg. She feels like the knee wants to buckle. Denies any new injuries or falls. Patient has tried multiple over the counter pain medications, Lidocaine patches and muscle relaxants with no significant relief. She has not tried PT but has orders for PT from her PCP.     Physical Exam: Skin intact. TTP suprapatellar region as well as medial joint line pain and pain over patellar tendon. 5-95 degrees of range of motion of the knee with active muscle tremoring on exam. Patellofemoral crepitus. Pain with lateralization of the patella. Ambulates with antalgic gait. Generalized decreased sensation along the lateral aspect of the right leg compared to the left.     Imaging: None obtained today.  Prior MRI of the right knee was again reviewed with her which shows some patellar chondromalacia.  Shows lateral patellar tilt.  TT TG distance of 13 mm      Assessment and Plan:    This is a pleasant 39 y.o. female who has right knee pain secondary to

## 2024-04-16 NOTE — PATIENT INSTRUCTIONS
Start gabapentin once daily at bedtime for 3-4 days; if tolerating well, may increase to twice daily for another few days and may continue to increase up to three times daily if needed.      Call with any issues.

## 2024-04-16 NOTE — PROGRESS NOTES
[facility]      Date of Visit:  2024  Patient Name: Tonya Wall   Patient :  1985     CHIEF COMPLAINT:     Tonya Wall is a 39 y.o. female who presents today to be evaluated for the following condition(s):  Chief Complaint   Patient presents with    Follow-up     Pt was seen in [facility] on 24 for back and sciatic pain that was radiating down her R leg. MRI had shown a bulging disc per pt. She is worried that the knee pain is affecting her gait and that it is causing her back pain to get worse - her knee is \"locking up\" when she tries to walk. She has PT today at 3pm for the knee and back pain. Dr. Zamora wants to do a \"gel shot\" in pt's knee - his office is working on a PA.       HISTORY OF PRESENT ILLNESS:      HPI     REVIEW OF SYSTEMS:     Review of Systems   Constitutional:  Negative for chills, fatigue, fever and unexpected weight change.   Respiratory:  Negative for cough, shortness of breath and wheezing.    Cardiovascular:  Negative for chest pain, palpitations and leg swelling.   Gastrointestinal:  Negative for abdominal pain, blood in stool, constipation, diarrhea, nausea and vomiting.   Musculoskeletal:  Positive for arthralgias, back pain, gait problem and joint swelling.   Neurological:  Negative for dizziness, syncope, weakness, light-headedness, numbness and headaches.        REVIEWED INFORMATION:      Current Outpatient Medications on File Prior to Visit   Medication Sig Dispense Refill    lidocaine 4 % external patch Place 1 patch onto the skin daily 30 patch 0    ibuprofen (ADVIL;MOTRIN) 200 MG tablet Take 3 tablets by mouth every 8 hours as needed for Pain or Fever 30 tablet 0    venlafaxine (EFFEXOR XR) 150 MG extended release capsule TAKE ONE CAPSULE BY MOUTH WITH FOOD ONCE DAILY 30 capsule 2    tiZANidine (ZANAFLEX) 2 MG tablet Take 1 tablet by mouth 3 times daily as needed (muscle pain) 30 tablet 1    acetaminophen (TYLENOL) 500 MG tablet Take 2 tablets by mouth 3

## 2024-04-19 ENCOUNTER — TELEPHONE (OUTPATIENT)
Dept: PRIMARY CARE CLINIC | Age: 39
End: 2024-04-19

## 2024-04-19 DIAGNOSIS — G89.29 CHRONIC RIGHT-SIDED LOW BACK PAIN WITH RIGHT-SIDED SCIATICA: Primary | ICD-10-CM

## 2024-04-19 DIAGNOSIS — M54.41 CHRONIC RIGHT-SIDED LOW BACK PAIN WITH RIGHT-SIDED SCIATICA: Primary | ICD-10-CM

## 2024-04-19 RX ORDER — HYDROCODONE BITARTRATE AND ACETAMINOPHEN 5; 325 MG/1; MG/1
1 TABLET ORAL EVERY 6 HOURS PRN
Qty: 28 TABLET | Refills: 0 | Status: SHIPPED | OUTPATIENT
Start: 2024-04-19 | End: 2024-04-26

## 2024-04-19 ASSESSMENT — ENCOUNTER SYMPTOMS
BACK PAIN: 1
ABDOMINAL PAIN: 0
SHORTNESS OF BREATH: 0

## 2024-04-19 NOTE — TELEPHONE ENCOUNTER
I'm sorry patient is in pain and am willing to send a limited number of [place] to her pharmacy.  I cannot help her situation at work but am more than happy to provide a work note or complete [facility] paperwork if necessary.  As far as \"nothing being done\", since the beginning of her issues in February, she has received dexamethasone, NSAID's, two different muscle relaxors, topical lidocaine, her venlafaxine has been increased, she was just started on gabapentin and she has been referred to physical therapy and pain management.  Additionally, she was seen in the Emergency Department once in the past week and received lab work to rule out infectious or inflammatory causes as well as receiving a STAT MRI which was fairly unremarkable and did not demonstrate any significant pathology to account for her symptoms or necessitate neurosurgical evaluation.  If her pain is that severe or if she is developing any new or changing symptoms, I recommend she return to the ED for evaluation and pain control.  I have sent a 1 week supply of [place] to her pharmacy; she is not to drive, operate heavy machinery or drink alcohol while taking.  Please have her scheduled next week to follow up.  The MRI currently scheduled for 4/22 can be cancelled as this is was already performed.  I recommend that she contact Pain Management asap to schedule an appointment.  If there are two other ED visits of which I am unaware, please let me know and obtain records.  OARRS reviewed today without abnormal findings.

## 2024-04-19 NOTE — TELEPHONE ENCOUNTER
Pt called crying in pain, states has two lidocaine patches on her knee but nothing is helping.  She states she is at work but will probably lose her job.   She states she has been in the ER 3 times this week.  Please advise. Thank you.

## 2024-04-19 NOTE — TELEPHONE ENCOUNTER
Pt called stating that she is in excruciating pain. She is currently at work and is afraid that she will lose her job because she is unable to perform her job duties. She stated that \"nothing is being done\" and she has already been to the ED without any solutions. She is asking what can be done.     Pt has an MRI scheduled for Monday, 4/22/24; a f/u with ortho on 4/23/24; and a referral placed to pain management.     Please advise.

## 2024-05-06 ENCOUNTER — OFFICE VISIT (OUTPATIENT)
Dept: PRIMARY CARE CLINIC | Age: 39
End: 2024-05-06
Payer: MEDICAID

## 2024-05-06 VITALS
HEART RATE: 85 BPM | WEIGHT: 256 LBS | BODY MASS INDEX: 38.8 KG/M2 | SYSTOLIC BLOOD PRESSURE: 144 MMHG | DIASTOLIC BLOOD PRESSURE: 96 MMHG | HEIGHT: 68 IN | OXYGEN SATURATION: 98 %

## 2024-05-06 DIAGNOSIS — G89.29 CHRONIC RIGHT-SIDED LOW BACK PAIN WITH RIGHT-SIDED SCIATICA: ICD-10-CM

## 2024-05-06 DIAGNOSIS — M22.41 CHONDROMALACIA OF PATELLOFEMORAL JOINT, RIGHT: ICD-10-CM

## 2024-05-06 DIAGNOSIS — M25.561 CHRONIC PAIN OF RIGHT KNEE: Primary | ICD-10-CM

## 2024-05-06 DIAGNOSIS — G89.29 CHRONIC PAIN OF RIGHT KNEE: Primary | ICD-10-CM

## 2024-05-06 DIAGNOSIS — M54.41 CHRONIC RIGHT-SIDED LOW BACK PAIN WITH RIGHT-SIDED SCIATICA: ICD-10-CM

## 2024-05-06 DIAGNOSIS — M23.200 OLD TEAR OF LATERAL MENISCUS OF RIGHT KNEE, UNSPECIFIED TEAR TYPE: ICD-10-CM

## 2024-05-06 PROCEDURE — G8417 CALC BMI ABV UP PARAM F/U: HCPCS | Performed by: STUDENT IN AN ORGANIZED HEALTH CARE EDUCATION/TRAINING PROGRAM

## 2024-05-06 PROCEDURE — 99214 OFFICE O/P EST MOD 30 MIN: CPT | Performed by: STUDENT IN AN ORGANIZED HEALTH CARE EDUCATION/TRAINING PROGRAM

## 2024-05-06 PROCEDURE — G8427 DOCREV CUR MEDS BY ELIG CLIN: HCPCS | Performed by: STUDENT IN AN ORGANIZED HEALTH CARE EDUCATION/TRAINING PROGRAM

## 2024-05-06 PROCEDURE — 4004F PT TOBACCO SCREEN RCVD TLK: CPT | Performed by: STUDENT IN AN ORGANIZED HEALTH CARE EDUCATION/TRAINING PROGRAM

## 2024-05-06 RX ORDER — GABAPENTIN 300 MG/1
300 CAPSULE ORAL 3 TIMES DAILY
Qty: 90 CAPSULE | Refills: 2 | Status: SHIPPED | OUTPATIENT
Start: 2024-05-06 | End: 2024-11-02

## 2024-05-06 NOTE — PROGRESS NOTES
[facility]      Date of Visit:  2024  Patient Name: Tonya Wall   Patient :  1985     CHIEF COMPLAINT:     Tonya Wall is a 39 y.o. female who presents today to be evaluated for the following condition(s):  Chief Complaint   Patient presents with    Back Pain     Pt states that her back is still painful, but \"not as much\". Pt states that her knee swells a lot and is still painful to walk on. The Gabapentin helps with her back pain - pain is 8/10 without the medication, 6/10 with it. Pt would like to schedule an MRI for her knee. She states that most days it feels like she is dragging her leg around with her and there is a burning sensation inside of her knee that \"does not go away\".     Weight Loss     Pt states that she has been watching her diet and exercising her core at home.        HISTORY OF PRESENT ILLNESS:      HPI     REVIEW OF SYSTEMS:     Review of Systems   Constitutional:  Negative for chills, fatigue, fever and unexpected weight change.   Respiratory:  Negative for cough, shortness of breath and wheezing.    Cardiovascular:  Negative for chest pain, palpitations and leg swelling.   Gastrointestinal:  Negative for abdominal pain, blood in stool, constipation, diarrhea, nausea and vomiting.   Musculoskeletal:  Positive for arthralgias, back pain, gait problem and joint swelling.   Neurological:  Negative for dizziness, syncope, weakness, light-headedness, numbness and headaches.        REVIEWED INFORMATION:      Current Outpatient Medications on File Prior to Visit   Medication Sig Dispense Refill    gabapentin (NEURONTIN) 300 MG capsule Take 1 capsule by mouth 3 times daily for 180 days. Intended supply: 90 days 90 capsule 2    lidocaine 4 % external patch Place 1 patch onto the skin daily 30 patch 0    ibuprofen (ADVIL;MOTRIN) 200 MG tablet Take 3 tablets by mouth every 8 hours as needed for Pain or Fever 30 tablet 0    venlafaxine (EFFEXOR XR) 150 MG extended release

## 2024-05-08 NOTE — PROGRESS NOTES
[facility] ORTHOPEDICS AND SPORTS MEDICINE  [address]  [place] [address]  Dept: [phone]  Dept Fax: [phone]          Right knee -new patient    Subjective:     Chief Complaint   Patient presents with    Follow-up     [facility] ED F/U-Right Knee Pain LI 1/25/24-New Patient     HPI:     Tonya Wall presents today for Right knee pain. Occupation:  at [employer]. The pain has been present for 7 weeks. she was last seen in the emergency department on 4/19/2024.  She was given IM Toradol and Flexeril for pain control at that visit.  Was first seen for her knee in the emergency department on 9/11/2023.  She was given crutches and an Ace wrap the patient recalls a specific injury where she was turning her knee and she felt a sharp pain. The patient has tried ibuprofen, naproxen, gabapentin, tylenol, tizanidine, tramadol with no improvement. The pain is now described as Achy, Sharp, and Dull . There is pain on weight bearing. The knee has swelled. There is  painful popping and clicking. The knee has caught or locked up. The knee has given out. It is  stiff upon arising from sitting. It is  painful to go up and down stairs and sit for a prolonged time. The patient has had a cortisone injection on 1/24/2024 by Dr. Carrasco. The patient has not tried a lubrication injection. The patient has tried physical therapy but stopped because it hurt too bad. The patient has not had surgery.  Patient states that she has seen Dr. Carrasco and has had 2 cortisone injections and an MRI of her knee.  He told her the only thing that was done to fix her knee would be a knee replacement and with her young age that would not be a good choice.  She is not happy with that option.  She is now also dealing with sciatica of the right side.  She is also had an MRI of her lumbar spine.  She states that she feels no one is listening to her and she is

## 2024-05-09 ENCOUNTER — OFFICE VISIT (OUTPATIENT)
Dept: ORTHOPEDIC SURGERY | Age: 39
End: 2024-05-09

## 2024-05-09 VITALS — BODY MASS INDEX: 39.34 KG/M2 | RESPIRATION RATE: 16 BRPM | HEIGHT: 68 IN | OXYGEN SATURATION: 98 % | WEIGHT: 259.6 LBS

## 2024-05-09 DIAGNOSIS — M17.11 PATELLOFEMORAL ARTHRITIS OF RIGHT KNEE: Primary | ICD-10-CM

## 2024-05-09 RX ORDER — TRAMADOL HYDROCHLORIDE 50 MG/1
50 TABLET ORAL EVERY 6 HOURS PRN
COMMUNITY

## 2024-05-09 RX ORDER — METHYLPREDNISOLONE ACETATE 80 MG/ML
80 INJECTION, SUSPENSION INTRA-ARTICULAR; INTRALESIONAL; INTRAMUSCULAR; SOFT TISSUE ONCE
Status: COMPLETED | OUTPATIENT
Start: 2024-05-09 | End: 2024-05-09

## 2024-05-09 RX ORDER — METHYLPREDNISOLONE 4 MG/1
TABLET ORAL
Qty: 1 KIT | Refills: 0 | Status: SHIPPED | OUTPATIENT
Start: 2024-05-09

## 2024-05-09 RX ORDER — LIDOCAINE HYDROCHLORIDE 10 MG/ML
2 INJECTION, SOLUTION INFILTRATION; PERINEURAL ONCE
Status: COMPLETED | OUTPATIENT
Start: 2024-05-09 | End: 2024-05-09

## 2024-05-09 RX ADMIN — METHYLPREDNISOLONE ACETATE 80 MG: 80 INJECTION, SUSPENSION INTRA-ARTICULAR; INTRALESIONAL; INTRAMUSCULAR; SOFT TISSUE at 16:21

## 2024-05-09 RX ADMIN — LIDOCAINE HYDROCHLORIDE 2 ML: 10 INJECTION, SOLUTION INFILTRATION; PERINEURAL at 16:21

## 2024-05-09 ASSESSMENT — ENCOUNTER SYMPTOMS
CONSTIPATION: 0
APNEA: 0
ABDOMINAL PAIN: 0
CHEST TIGHTNESS: 0
ABDOMINAL DISTENTION: 0
COUGH: 0
VOMITING: 0
NAUSEA: 0
DIARRHEA: 0
COLOR CHANGE: 0
SHORTNESS OF BREATH: 0
RESPIRATORY NEGATIVE: 1
GASTROINTESTINAL NEGATIVE: 1

## 2024-05-15 ENCOUNTER — TELEPHONE (OUTPATIENT)
Dept: ORTHOPEDIC SURGERY | Age: 39
End: 2024-05-15

## 2024-05-16 ENCOUNTER — TELEPHONE (OUTPATIENT)
Dept: PRIMARY CARE CLINIC | Age: 39
End: 2024-05-16

## 2024-05-16 NOTE — TELEPHONE ENCOUNTER
Patient calling in to inform that her right ankle is swelling, stated she was in a wheelchair and they rolled over her ankle, she also would like for you to know that she is in a mental hospital, she is requesting you give her a call, she is not willing to give me any further information, she is not willing to set up an appointment, also stated since you are her doctor she need you to call her for comfort reasons as well. Request  you call her at the hospital, phone number is [phone]

## 2024-05-17 NOTE — TELEPHONE ENCOUNTER
Spoke with patient.  She reports having had suicidal thoughts which she attributes to her knee pain and other stressors at home; currently admitted to [facility] and receiving treatment.  Ortho has been consulted and MRI results are pending for her right knee.  She also notes a right ankle sprain which is also being addressed by the inpatient team.  She is tearful.  I did my best to be supportive and console her.  Recommended continuing to follow recommendations by her inpatient team and to call our office when she is getting ready to be discharged so that we may coordinate her care.  She denies any additional needs at this time and will follow up upon discharge.

## 2024-06-05 ENCOUNTER — TELEPHONE (OUTPATIENT)
Dept: ORTHOPEDIC SURGERY | Age: 39
End: 2024-06-05

## 2024-06-05 NOTE — TELEPHONE ENCOUNTER
Called patient to see if she has approval for her Durolane injection. Per our records, we do not have approval.

## 2024-06-06 NOTE — TELEPHONE ENCOUNTER
Per my , We will stop prior authorization submission for the [place] office. They patient would like to continue her care at the . 's Office.    Antonia has restarted prior auth for Durolane at the . V's Office and will receive updates from there as well as receive the injection there.

## 2024-06-17 ENCOUNTER — TELEPHONE (OUTPATIENT)
Dept: ORTHOPEDIC SURGERY | Age: 39
End: 2024-06-17

## 2024-06-17 ENCOUNTER — TELEMEDICINE (OUTPATIENT)
Dept: PRIMARY CARE CLINIC | Age: 39
End: 2024-06-17
Payer: MEDICAID

## 2024-06-17 DIAGNOSIS — F32.A DEPRESSION, UNSPECIFIED DEPRESSION TYPE: Primary | ICD-10-CM

## 2024-06-17 DIAGNOSIS — M22.41 CHONDROMALACIA OF PATELLOFEMORAL JOINT, RIGHT: ICD-10-CM

## 2024-06-17 DIAGNOSIS — M54.41 CHRONIC RIGHT-SIDED LOW BACK PAIN WITH RIGHT-SIDED SCIATICA: ICD-10-CM

## 2024-06-17 DIAGNOSIS — G89.29 CHRONIC RIGHT-SIDED LOW BACK PAIN WITH RIGHT-SIDED SCIATICA: ICD-10-CM

## 2024-06-17 PROCEDURE — 99442 PR PHYS/QHP TELEPHONE EVALUATION 11-20 MIN: CPT | Performed by: STUDENT IN AN ORGANIZED HEALTH CARE EDUCATION/TRAINING PROGRAM

## 2024-06-17 RX ORDER — LIDOCAINE 50 MG/G
1 PATCH TOPICAL DAILY
COMMUNITY
Start: 2024-05-21

## 2024-06-17 RX ORDER — VENLAFAXINE HYDROCHLORIDE 75 MG/1
CAPSULE, EXTENDED RELEASE ORAL
Qty: 30 CAPSULE | Refills: 2 | Status: SHIPPED | OUTPATIENT
Start: 2024-06-17

## 2024-06-17 NOTE — PROGRESS NOTES
TELEHEALTH OFFICE VISIT     Name: Tonya Wall  : 1985         Telehealth Disclaimer:   - Patient verbally consented for a telehealth visit today with provider. Risks, benefits and alternatives to telehealth visit, were understood by the patient.   - Today's telehealth visit was performed via synchronous communication.   - The provider determined that the minimum standard of care for in-person visits could be met, via telehealth visit, for this encounter.   - The patient and the physician were both located in the [facility] for this visit.   - The visit was performed in a manner that complies with privacy and security requirements for protected health information.    All of this is in compliance with General Telehealth Provisions ([facility] rule )     Ask patient if anyone else can hear or see the session: Tonya was alone at today's visit.  Platform used: Doxy for [employer] [phone]    Chief Complaint:    Tonya Wall is a 39 y.o. year old female who presents today for   Chief Complaint   Patient presents with    Back Pain    Knee Pain       History of Present Illness:    Back Pain: pt has been experiencing back on and off for 3+ months. She has not gone to pain management, but has seen ortho. She would like to discuss short-term disability because she can not work while in pain.    Knee Pain: she has seen ortho, who wanted to do a knee injection, but can not due to her insurance. This pain is affecting how she walks and she has been off of work for 6 weeks.     Will be following the [facility] on 24.  Medications:    Outpatient Medications Prior to Visit   Medication Sig Dispense Refill    lidocaine (LIDODERM) 5 % Place 1 patch onto the skin daily      gabapentin (NEURONTIN) 300 MG capsule Take 1 capsule by mouth 3 times daily for 180 days. Intended supply: 90 days 90 capsule 2    tiZANidine (ZANAFLEX) 2 MG tablet Take 1 tablet by mouth 3 times daily as needed (muscle pain) 30

## 2024-06-25 ENCOUNTER — TELEPHONE (OUTPATIENT)
Dept: PRIMARY CARE CLINIC | Age: 39
End: 2024-06-25

## 2024-06-25 NOTE — TELEPHONE ENCOUNTER
Last appt 06/17/24 (VV)  Next appt NONE found      FYI:      [facility] called to advise pt had been at [facility] from 05/14/24 through 05/21/24.  For major depressive disorder.

## 2024-07-08 ASSESSMENT — ENCOUNTER SYMPTOMS
VOMITING: 0
BACK PAIN: 1
ABDOMINAL PAIN: 0
BACK PAIN: 1
DIARRHEA: 0
BLOOD IN STOOL: 0
BLOOD IN STOOL: 0
SHORTNESS OF BREATH: 0
CONSTIPATION: 0
VOMITING: 0
WHEEZING: 0
ABDOMINAL PAIN: 0
COUGH: 0
COUGH: 0
NAUSEA: 0
WHEEZING: 0
NAUSEA: 0
DIARRHEA: 0
CONSTIPATION: 0
SHORTNESS OF BREATH: 0

## 2024-07-19 DIAGNOSIS — E03.9 ACQUIRED HYPOTHYROIDISM: ICD-10-CM

## 2024-07-19 DIAGNOSIS — F32.A DEPRESSION, UNSPECIFIED DEPRESSION TYPE: ICD-10-CM

## 2024-07-19 NOTE — TELEPHONE ENCOUNTER
Last appt  Last refill  Nexta ppt      Pt states she wants PCP to handle all her medication dues to Psychiatrist  has discontinued her Thyroid meds and her Effexor .    She is out of her Effexor x 2 days now.  Has a few days left of her Thyroid.  Needs a call back asap.    Please advise. Thank you.

## 2024-07-22 NOTE — TELEPHONE ENCOUNTER
This doesn't make sense.  Could we please call patient and get additional detail as to why these were discontinued?  I would prefer that she continue to follow with a psychiatrist and if she needs to establish with a new psychiatrist, we can place referral.  I can provide bridging medication until she is able to establish but need more information before renewing venlafaxine.  Thanks.

## 2024-07-22 NOTE — TELEPHONE ENCOUNTER
Pt states that she will feel more comfortable with Dr. Lema prescribing her medications moving forward. The levothyroxine and Effexor weren't discontinued per pt, psych had \"messed up\" her medications due to not being familiar with the new EMR program they have and didn't know how to fix it. She will need refills of both medications.     Pt will be looking for a new PCP for herself and her children and she would like to know if there is anyone near [place] or [place], [place] that Dr. Lema could recommend.    Please advise.

## 2024-07-23 RX ORDER — VENLAFAXINE HYDROCHLORIDE 75 MG/1
CAPSULE, EXTENDED RELEASE ORAL
Qty: 90 CAPSULE | Refills: 0 | Status: SHIPPED | OUTPATIENT
Start: 2024-07-23

## 2024-07-23 RX ORDER — LEVOTHYROXINE SODIUM 0.1 MG/1
100 TABLET ORAL DAILY
Qty: 90 TABLET | Refills: 0 | Status: SHIPPED | OUTPATIENT
Start: 2024-07-23 | End: 2025-07-23

## 2024-11-19 ENCOUNTER — TELEPHONE (OUTPATIENT)
Dept: ORTHOPEDIC SURGERY | Age: 39
End: 2024-11-19

## 2024-11-19 NOTE — TELEPHONE ENCOUNTER
Pt called in stating she needs a copy of the durolane for her insurance and she's also wanting to know if surgery will be an option in the future

## 2024-11-19 NOTE — TELEPHONE ENCOUNTER
Called patient for Clarification. Her insurance needs more clarification on Durolane. They need to know the dose is 60mg/3ML She states that this needs to come from our office. Advised patient to contact her insurance and ask them to call the office

## 2024-11-20 DIAGNOSIS — F32.A DEPRESSION, UNSPECIFIED DEPRESSION TYPE: ICD-10-CM

## 2024-11-20 DIAGNOSIS — E03.9 ACQUIRED HYPOTHYROIDISM: ICD-10-CM

## 2024-11-20 RX ORDER — LEVOTHYROXINE SODIUM 100 UG/1
100 TABLET ORAL DAILY
Qty: 90 TABLET | Refills: 0 | OUTPATIENT
Start: 2024-11-20 | End: 2025-11-20

## 2024-11-20 RX ORDER — VENLAFAXINE HYDROCHLORIDE 75 MG/1
CAPSULE, EXTENDED RELEASE ORAL
Qty: 90 CAPSULE | Refills: 0 | OUTPATIENT
Start: 2024-11-20

## 2024-11-20 NOTE — TELEPHONE ENCOUNTER
Last Visit:  6/17/2024     Next Visit Date:  Future Appointments   Date Time Provider Department Center   12/12/2024 10:15 AM Mirta Shi DO [facility]

## 2024-12-26 ENCOUNTER — APPOINTMENT (OUTPATIENT)
Dept: CT IMAGING | Facility: CLINIC | Age: 39
End: 2024-12-26
Payer: MEDICAID

## 2024-12-26 ENCOUNTER — HOSPITAL ENCOUNTER (EMERGENCY)
Facility: CLINIC | Age: 39
Discharge: HOME | End: 2024-12-26
Attending: EMERGENCY MEDICINE
Payer: MEDICAID

## 2024-12-26 VITALS
OXYGEN SATURATION: 100 % | RESPIRATION RATE: 18 BRPM | BODY MASS INDEX: 37.03 KG/M2 | WEIGHT: 250 LBS | HEART RATE: 96 BPM | TEMPERATURE: 97.6 F | DIASTOLIC BLOOD PRESSURE: 72 MMHG | HEIGHT: 69 IN | SYSTOLIC BLOOD PRESSURE: 140 MMHG

## 2024-12-26 DIAGNOSIS — K59.1 FUNCTIONAL DIARRHEA: Primary | ICD-10-CM

## 2024-12-26 LAB
ALBUMIN SERPL-MCNC: 4.2 G/DL (ref 3.5–5.2)
ALBUMIN/GLOB SERPL: 1.2 {RATIO}
ALP SERPL-CCNC: 63 U/L (ref 35–104)
ALT SERPL-CCNC: 12 U/L (ref 10–35)
ANION GAP SERPL CALCULATED.3IONS-SCNC: 12 MMOL/L (ref 9–16)
AST SERPL-CCNC: 13 U/L (ref 10–35)
BASOPHILS # BLD: 0 K/UL (ref 0–0.2)
BASOPHILS NFR BLD: 1 % (ref 0–2)
BILIRUB SERPL-MCNC: 0.4 MG/DL (ref 0–1.2)
BILIRUB UR QL STRIP: NEGATIVE
BUN SERPL-MCNC: 13 MG/DL (ref 6–20)
CALCIUM SERPL-MCNC: 9.5 MG/DL (ref 8.6–10.4)
CHLORIDE SERPL-SCNC: 101 MMOL/L (ref 98–107)
CLARITY UR: CLEAR
CO2 SERPL-SCNC: 25 MMOL/L (ref 20–31)
COLOR UR: YELLOW
COMMENT: NORMAL
CREAT SERPL-MCNC: 0.9 MG/DL (ref 0.5–0.9)
EOSINOPHIL # BLD: 0.2 K/UL (ref 0–0.4)
EOSINOPHILS RELATIVE PERCENT: 3 % (ref 1–4)
ERYTHROCYTE [DISTWIDTH] IN BLOOD BY AUTOMATED COUNT: 12.7 % (ref 12.5–15.4)
FLUAV AG SPEC QL: NEGATIVE
FLUBV AG SPEC QL: NEGATIVE
GFR, ESTIMATED: 84 ML/MIN/1.73M2
GLUCOSE SERPL-MCNC: 93 MG/DL (ref 74–99)
GLUCOSE UR STRIP-MCNC: NEGATIVE MG/DL
HCT VFR BLD AUTO: 38 % (ref 36–46)
HGB BLD-MCNC: 13.1 G/DL (ref 12–16)
HGB UR QL STRIP.AUTO: NEGATIVE
KETONES UR STRIP-MCNC: NEGATIVE MG/DL
LEUKOCYTE ESTERASE UR QL STRIP: NEGATIVE
LIPASE SERPL-CCNC: 24 U/L (ref 13–60)
LYMPHOCYTES NFR BLD: 1.5 K/UL (ref 1–4.8)
LYMPHOCYTES RELATIVE PERCENT: 21 % (ref 24–44)
MCH RBC QN AUTO: 29.6 PG (ref 26–34)
MCHC RBC AUTO-ENTMCNC: 34.4 G/DL (ref 31–37)
MCV RBC AUTO: 86.1 FL (ref 80–100)
MONOCYTES NFR BLD: 0.5 K/UL (ref 0.1–1.2)
MONOCYTES NFR BLD: 6 % (ref 2–11)
NEUTROPHILS NFR BLD: 69 % (ref 36–66)
NEUTS SEG NFR BLD: 5.2 K/UL (ref 1.8–7.7)
NITRITE UR QL STRIP: NEGATIVE
PH UR STRIP: 5.5 [PH] (ref 5–8)
PLATELET # BLD AUTO: 342 K/UL (ref 140–450)
PMV BLD AUTO: 7.2 FL (ref 6–12)
POTASSIUM SERPL-SCNC: 4 MMOL/L (ref 3.7–5.3)
PROT SERPL-MCNC: 7.6 G/DL (ref 6.6–8.7)
PROT UR STRIP-MCNC: NEGATIVE MG/DL
RBC # BLD AUTO: 4.41 M/UL (ref 4–5.2)
SARS-COV-2 RDRP RESP QL NAA+PROBE: NOT DETECTED
SODIUM SERPL-SCNC: 138 MMOL/L (ref 136–145)
SP GR UR STRIP: 1.01 (ref 1–1.03)
SPECIMEN DESCRIPTION: NORMAL
UROBILINOGEN UR STRIP-ACNC: NORMAL EU/DL (ref 0–1)
WBC OTHER # BLD: 7.5 K/UL (ref 3.5–11)

## 2024-12-26 PROCEDURE — 6360000002 HC RX W HCPCS

## 2024-12-26 PROCEDURE — 87635 SARS-COV-2 COVID-19 AMP PRB: CPT

## 2024-12-26 PROCEDURE — 96374 THER/PROPH/DIAG INJ IV PUSH: CPT

## 2024-12-26 PROCEDURE — 81003 URINALYSIS AUTO W/O SCOPE: CPT

## 2024-12-26 PROCEDURE — 80053 COMPREHEN METABOLIC PANEL: CPT

## 2024-12-26 PROCEDURE — 87804 INFLUENZA ASSAY W/OPTIC: CPT

## 2024-12-26 PROCEDURE — 99284 EMERGENCY DEPT VISIT MOD MDM: CPT

## 2024-12-26 PROCEDURE — 83690 ASSAY OF LIPASE: CPT

## 2024-12-26 PROCEDURE — 74176 CT ABD & PELVIS W/O CONTRAST: CPT

## 2024-12-26 PROCEDURE — 2580000003 HC RX 258

## 2024-12-26 PROCEDURE — 85025 COMPLETE CBC W/AUTO DIFF WBC: CPT

## 2024-12-26 RX ORDER — 0.9 % SODIUM CHLORIDE 0.9 %
1000 INTRAVENOUS SOLUTION INTRAVENOUS ONCE
Status: COMPLETED | OUTPATIENT
Start: 2024-12-26 | End: 2024-12-26

## 2024-12-26 RX ORDER — ONDANSETRON 2 MG/ML
4 INJECTION INTRAMUSCULAR; INTRAVENOUS ONCE
Status: COMPLETED | OUTPATIENT
Start: 2024-12-26 | End: 2024-12-26

## 2024-12-26 RX ORDER — MAGNESIUM CARB/ALUMINUM HYDROX 105-160MG
296 TABLET,CHEWABLE ORAL ONCE
Qty: 296 ML | Refills: 0 | Status: SHIPPED | OUTPATIENT
Start: 2024-12-26 | End: 2024-12-26

## 2024-12-26 RX ADMIN — SODIUM CHLORIDE 1000 ML: 9 INJECTION, SOLUTION INTRAVENOUS at 10:40

## 2024-12-26 RX ADMIN — ONDANSETRON 4 MG: 2 INJECTION, SOLUTION INTRAMUSCULAR; INTRAVENOUS at 10:41

## 2024-12-26 ASSESSMENT — ENCOUNTER SYMPTOMS
SHORTNESS OF BREATH: 0
ABDOMINAL PAIN: 1
VOMITING: 1
RHINORRHEA: 0
NAUSEA: 1
DIARRHEA: 1
COUGH: 0
COLOR CHANGE: 0
SORE THROAT: 0

## 2024-12-26 ASSESSMENT — PAIN SCALES - GENERAL: PAINLEVEL_OUTOF10: 2

## 2024-12-26 ASSESSMENT — PAIN DESCRIPTION - LOCATION: LOCATION: GENERALIZED

## 2024-12-26 ASSESSMENT — PAIN - FUNCTIONAL ASSESSMENT: PAIN_FUNCTIONAL_ASSESSMENT: 0-10

## 2024-12-26 ASSESSMENT — LIFESTYLE VARIABLES
HOW MANY STANDARD DRINKS CONTAINING ALCOHOL DO YOU HAVE ON A TYPICAL DAY: PATIENT DOES NOT DRINK
HOW OFTEN DO YOU HAVE A DRINK CONTAINING ALCOHOL: NEVER

## 2024-12-26 NOTE — ED PROVIDER NOTES
[facility] EM[facility]PART[facility]  eMERGENCY dEPARTMENT eNCOUnter   Independent Attestation     Pt Name: oTnya Wall  MRN: [id number]  Birthdate 1985  Date of evaluation: 12/26/24       Tonya Wall is a 39 y.o. female who presents with Diarrhea and Fatigue        Based on the medical record, the care appears appropriate. I was personally available for consultation in the Emergency Department.    Dakota Wilson DO  Attending Emergency  Physician                Dakota Wilson DO  12/26/24 1035

## 2024-12-26 NOTE — ED TRIAGE NOTES
Patient comes to the ED with diarrhea for the past six days, also feels fatigued and has generalized body aches. Has had vomiting on and off, but not today. States that she feels that this is not the flu but something more serious. Here for juanito.

## 2024-12-26 NOTE — ED PROVIDER NOTES
[facility]  EMERGENCY DEPARTMENT ENCOUNTER      Pt Name: Tonya Wall  MRN: [id number]  Birthdate 1985  Date of evaluation: 12/26/2024  Provider: NELLY Dunn CNP  7:38 PM    CHIEF COMPLAINT       Chief Complaint   Patient presents with    Diarrhea    Fatigue         HISTORY OF PRESENT ILLNESS    Tonya Wall is a 39 y.o. female who presents to the emergency department with diarrhea x 6 days     HPI  This is a 39-year-old female who presents with severe diarrhea over the last 6 days.  Associated symptoms have been fever, body aches, nausea and vomiting.  She has minimal abdominal pain.  Treatments have been bland diet.  Course of illness has been unchanged.  There are no other ill people in the home.  Surgical abdominal history is a hysterectomy and exploratory laparotomy.  Eating makes the diarrhea worse there have been no alleviating factors  Nursing Notes were reviewed.    REVIEW OF SYSTEMS       Review of Systems   Constitutional:  Positive for fatigue and fever. Negative for activity change and chills.   HENT:  Negative for congestion, ear pain, rhinorrhea and sore throat.    Respiratory:  Negative for cough and shortness of breath.    Cardiovascular:  Negative for chest pain and leg swelling.   Gastrointestinal:  Positive for abdominal pain, diarrhea, nausea and vomiting.   Genitourinary:  Negative for difficulty urinating and pelvic pain.   Musculoskeletal:  Positive for myalgias. Negative for arthralgias and joint swelling.   Skin:  Negative for color change.   Neurological:  Negative for dizziness, numbness and headaches.       Except as noted above the remainder of the review of systems was reviewed and negative.       PAST MEDICAL HISTORY     Past Medical History:   Diagnosis Date    Depression     on rx per pcp    Endometriosis     Fibroid uterus 2018    Hypothyroidism     PCOS (polycystic ovarian syndrome)     Thyroid disease     Wears eyeglasses

## 2024-12-26 NOTE — DISCHARGE INSTRUCTIONS
Impression:    1. Normal gas-filled appendix.  Mild-to-moderate stool burden.  Mild colonic  diverticulosis.  2. No evidence for small bowel obstruction.  3. Possible mild gallbladder sludge.  Consider further evaluation with  gallbladder ultrasound.  4. Fatty liver.  5. Small midline fat containing periumbilical hernia.            Drugstore options for constipation: You can make your own milk and molasses enema.  Buy a fleets or pediatric glycerin enema.  You can replace the fluid with warm molasses and cold milk then follow instructions for administering the enema.  Molasses is in the baked goods at the grocery store.  You could try a small amount of oral magnesium citrate.   Drinking 4 to 6 ounces of apple juice will help promote bowel movement.  Pitted fruits such as peaches, plums, prunes or dried pitted prunes will also help promote bowel movements.  Increasing your water intake will help hydrate and promote bowel movements.  Do not drink too much dairy as this will constipate you also eating too many bananas or too much cheese will cause constipation.  Follow-up with your primary care on gallbladder CT findings and if your abdominal pain continues.  I did order magnesium citrate for you to drink to help evacuate the constipation buildup.

## 2025-02-11 ENCOUNTER — OFFICE VISIT (OUTPATIENT)
Dept: PRIMARY CARE CLINIC | Age: 40
End: 2025-02-11
Payer: MEDICAID

## 2025-02-11 ENCOUNTER — HOSPITAL ENCOUNTER (OUTPATIENT)
Age: 40
Setting detail: SPECIMEN
Discharge: HOME | End: 2025-02-11

## 2025-02-11 VITALS
OXYGEN SATURATION: 99 % | SYSTOLIC BLOOD PRESSURE: 124 MMHG | DIASTOLIC BLOOD PRESSURE: 84 MMHG | WEIGHT: 251 LBS | HEIGHT: 69 IN | BODY MASS INDEX: 37.18 KG/M2 | HEART RATE: 91 BPM

## 2025-02-11 DIAGNOSIS — E66.01 CLASS 3 SEVERE OBESITY DUE TO EXCESS CALORIES WITH SERIOUS COMORBIDITY AND BODY MASS INDEX (BMI) OF 40.0 TO 44.9 IN ADULT: Chronic | ICD-10-CM

## 2025-02-11 DIAGNOSIS — M54.41 CHRONIC RIGHT-SIDED LOW BACK PAIN WITH RIGHT-SIDED SCIATICA: Chronic | ICD-10-CM

## 2025-02-11 DIAGNOSIS — G89.29 CHRONIC RIGHT-SIDED LOW BACK PAIN WITH RIGHT-SIDED SCIATICA: Chronic | ICD-10-CM

## 2025-02-11 DIAGNOSIS — F33.41 RECURRENT MAJOR DEPRESSION IN PARTIAL REMISSION: Chronic | ICD-10-CM

## 2025-02-11 DIAGNOSIS — G89.29 CHRONIC PAIN OF RIGHT KNEE: Chronic | ICD-10-CM

## 2025-02-11 DIAGNOSIS — E66.813 CLASS 3 SEVERE OBESITY DUE TO EXCESS CALORIES WITH SERIOUS COMORBIDITY AND BODY MASS INDEX (BMI) OF 40.0 TO 44.9 IN ADULT: Chronic | ICD-10-CM

## 2025-02-11 DIAGNOSIS — M25.561 CHRONIC PAIN OF RIGHT KNEE: Chronic | ICD-10-CM

## 2025-02-11 DIAGNOSIS — Z13.1 ENCOUNTER FOR SCREENING FOR DIABETES MELLITUS: Chronic | ICD-10-CM

## 2025-02-11 DIAGNOSIS — E03.9 ACQUIRED HYPOTHYROIDISM: Chronic | ICD-10-CM

## 2025-02-11 DIAGNOSIS — E03.9 ACQUIRED HYPOTHYROIDISM: Primary | Chronic | ICD-10-CM

## 2025-02-11 PROBLEM — M17.11 LOCALIZED OSTEOARTHRITIS OF RIGHT KNEE: Status: ACTIVE | Noted: 2024-05-16

## 2025-02-11 PROBLEM — T14.91XA SUICIDAL BEHAVIOR WITH ATTEMPTED SELF-INJURY (HCC): Status: RESOLVED | Noted: 2024-05-14 | Resolved: 2025-02-11

## 2025-02-11 LAB
ALBUMIN SERPL-MCNC: 4.4 G/DL (ref 3.5–5.2)
ALBUMIN/GLOB SERPL: 1.6 {RATIO} (ref 1–2.5)
ALP SERPL-CCNC: 62 U/L (ref 35–104)
ALT SERPL-CCNC: 19 U/L (ref 10–35)
ANION GAP SERPL CALCULATED.3IONS-SCNC: 8 MMOL/L (ref 9–16)
AST SERPL-CCNC: 15 U/L (ref 10–35)
BILIRUB SERPL-MCNC: 0.2 MG/DL (ref 0–1.2)
BUN SERPL-MCNC: 14 MG/DL (ref 6–20)
CALCIUM SERPL-MCNC: 9.7 MG/DL (ref 8.6–10.4)
CHLORIDE SERPL-SCNC: 103 MMOL/L (ref 98–107)
CO2 SERPL-SCNC: 29 MMOL/L (ref 20–31)
CREAT SERPL-MCNC: 0.8 MG/DL (ref 0.6–0.9)
EST. AVERAGE GLUCOSE BLD GHB EST-MCNC: 100 MG/DL
GFR, ESTIMATED: >90 ML/MIN/1.73M2
GLUCOSE SERPL-MCNC: 90 MG/DL (ref 74–99)
HBA1C MFR BLD: 5.1 % (ref 4–6)
POTASSIUM SERPL-SCNC: 4.4 MMOL/L (ref 3.7–5.3)
PROT SERPL-MCNC: 7.2 G/DL (ref 6.6–8.7)
SODIUM SERPL-SCNC: 140 MMOL/L (ref 136–145)
T4 FREE SERPL-MCNC: 1 NG/DL (ref 0.92–1.68)
TSH SERPL DL<=0.05 MIU/L-ACNC: 1.71 UIU/ML (ref 0.27–4.2)

## 2025-02-11 PROCEDURE — 99214 OFFICE O/P EST MOD 30 MIN: CPT | Performed by: STUDENT IN AN ORGANIZED HEALTH CARE EDUCATION/TRAINING PROGRAM

## 2025-02-11 PROCEDURE — G8417 CALC BMI ABV UP PARAM F/U: HCPCS | Performed by: STUDENT IN AN ORGANIZED HEALTH CARE EDUCATION/TRAINING PROGRAM

## 2025-02-11 PROCEDURE — 4004F PT TOBACCO SCREEN RCVD TLK: CPT | Performed by: STUDENT IN AN ORGANIZED HEALTH CARE EDUCATION/TRAINING PROGRAM

## 2025-02-11 PROCEDURE — G8427 DOCREV CUR MEDS BY ELIG CLIN: HCPCS | Performed by: STUDENT IN AN ORGANIZED HEALTH CARE EDUCATION/TRAINING PROGRAM

## 2025-02-11 RX ORDER — GABAPENTIN 300 MG/1
300 CAPSULE ORAL 2 TIMES DAILY
Qty: 180 CAPSULE | Refills: 0 | Status: SHIPPED | OUTPATIENT
Start: 2025-02-11 | End: 2025-05-12

## 2025-02-11 RX ORDER — GABAPENTIN 300 MG/1
300 CAPSULE ORAL 3 TIMES DAILY
COMMUNITY
End: 2025-02-11 | Stop reason: SDUPTHER

## 2025-02-11 SDOH — ECONOMIC STABILITY: FOOD INSECURITY: WITHIN THE PAST 12 MONTHS, THE FOOD YOU BOUGHT JUST DIDN'T LAST AND YOU DIDN'T HAVE MONEY TO GET MORE.: NEVER TRUE

## 2025-02-11 SDOH — ECONOMIC STABILITY: FOOD INSECURITY: WITHIN THE PAST 12 MONTHS, YOU WORRIED THAT YOUR FOOD WOULD RUN OUT BEFORE YOU GOT MONEY TO BUY MORE.: NEVER TRUE

## 2025-02-11 ASSESSMENT — PATIENT HEALTH QUESTIONNAIRE - PHQ9
SUM OF ALL RESPONSES TO PHQ QUESTIONS 1-9: 1
10. IF YOU CHECKED OFF ANY PROBLEMS, HOW DIFFICULT HAVE THESE PROBLEMS MADE IT FOR YOU TO DO YOUR WORK, TAKE CARE OF THINGS AT HOME, OR GET ALONG WITH OTHER PEOPLE: NOT DIFFICULT AT ALL
4. FEELING TIRED OR HAVING LITTLE ENERGY: NOT AT ALL
SUM OF ALL RESPONSES TO PHQ QUESTIONS 1-9: 1
8. MOVING OR SPEAKING SO SLOWLY THAT OTHER PEOPLE COULD HAVE NOTICED. OR THE OPPOSITE, BEING SO FIGETY OR RESTLESS THAT YOU HAVE BEEN MOVING AROUND A LOT MORE THAN USUAL: NOT AT ALL
SUM OF ALL RESPONSES TO PHQ QUESTIONS 1-9: 1
1. LITTLE INTEREST OR PLEASURE IN DOING THINGS: NOT AT ALL
3. TROUBLE FALLING OR STAYING ASLEEP: SEVERAL DAYS
5. POOR APPETITE OR OVEREATING: NOT AT ALL
6. FEELING BAD ABOUT YOURSELF - OR THAT YOU ARE A FAILURE OR HAVE LET YOURSELF OR YOUR FAMILY DOWN: NOT AT ALL
7. TROUBLE CONCENTRATING ON THINGS, SUCH AS READING THE NEWSPAPER OR WATCHING TELEVISION: NOT AT ALL
2. FEELING DOWN, DEPRESSED OR HOPELESS: NOT AT ALL
9. THOUGHTS THAT YOU WOULD BE BETTER OFF DEAD, OR OF HURTING YOURSELF: NOT AT ALL
SUM OF ALL RESPONSES TO PHQ QUESTIONS 1-9: 1

## 2025-02-11 NOTE — ASSESSMENT & PLAN NOTE
Chronic, at goal (stable), continue current treatment plan    Orders:    Comprehensive Metabolic Panel; Future    TSH; Future    T4, Free; Future

## 2025-02-11 NOTE — PROGRESS NOTES
[facility]  [facility]  [address] [place] [id number]  Dept: [phone]       Date of Visit:  2025  Patient Name: Tonya Wall   Patient :  1985     CHIEF COMPLAINT:     Tonya Wall is a 40 y.o. female who presents today to be evaluated for the following condition(s):  Chief Complaint   Patient presents with    Knee Pain     Pt would like to discuss R knee pain and sciatic issues that she has been experiencing. She sees Dr. Damian in ortho who is willing to consider surgery for the affected knee.     Other     PATRICIA OK       HISTORY OF PRESENT ILLNESS:      History of Present Illness  The patient presents for evaluation of right knee pain, hypothyroidism, depression, and sciatica.    She has been under the care of Dr. Damian for her right knee condition, with a recent consultation regarding potential surgical intervention. She reports swelling in her right knee but does not experience any associated ankle or foot swelling. She is not experiencing chest pain, palpitations, shortness of breath, or difficulty breathing when lying flat. She is currently employed in two jobs and is seeking time off under the Family and Medical Leave Act (FMLA) for her upcoming surgery. She has experienced significant weight loss, approximately 35 pounds, and continues to strive for further weight reduction.    She has discontinued venlafaxine for the past 2 months, attributing her improved mental clarity to this decision. She reports no emotional crashes since stopping the medication. She has made significant lifestyle modifications, including dietary changes and regular exercise. She occasionally consults with a counselor or psychiatrist over the phone.    She is seeking a refill of her gabapentin prescription, which she finds beneficial for her sciatica. She is considering chiropractic treatment for her back pain. She reports that her right leg pain has decreased since starting

## 2025-02-11 NOTE — ASSESSMENT & PLAN NOTE
Chronic, at goal (stable), continue current treatment plan    Orders:    gabapentin (NEURONTIN) 300 MG capsule; Take 1 capsule by mouth 2 times daily for 90 days.

## 2025-02-12 DIAGNOSIS — E03.9 ACQUIRED HYPOTHYROIDISM: ICD-10-CM

## 2025-02-12 RX ORDER — LEVOTHYROXINE SODIUM 100 UG/1
100 TABLET ORAL DAILY
Qty: 90 TABLET | Refills: 1 | Status: SHIPPED | OUTPATIENT
Start: 2025-02-12

## 2025-02-25 DIAGNOSIS — M17.11 PATELLOFEMORAL ARTHRITIS OF RIGHT KNEE: Primary | ICD-10-CM

## 2025-02-26 ENCOUNTER — OFFICE VISIT (OUTPATIENT)
Dept: ORTHOPEDIC SURGERY | Age: 40
End: 2025-02-26
Payer: MEDICAID

## 2025-02-26 VITALS — RESPIRATION RATE: 17 BRPM | OXYGEN SATURATION: 99 % | HEIGHT: 69 IN | WEIGHT: 255 LBS | BODY MASS INDEX: 37.77 KG/M2

## 2025-02-26 DIAGNOSIS — M17.11 PATELLOFEMORAL ARTHRITIS OF RIGHT KNEE: Primary | ICD-10-CM

## 2025-02-26 PROCEDURE — 4004F PT TOBACCO SCREEN RCVD TLK: CPT | Performed by: ORTHOPAEDIC SURGERY

## 2025-02-26 PROCEDURE — G8417 CALC BMI ABV UP PARAM F/U: HCPCS | Performed by: ORTHOPAEDIC SURGERY

## 2025-02-26 PROCEDURE — 99213 OFFICE O/P EST LOW 20 MIN: CPT | Performed by: ORTHOPAEDIC SURGERY

## 2025-02-26 PROCEDURE — G8427 DOCREV CUR MEDS BY ELIG CLIN: HCPCS | Performed by: ORTHOPAEDIC SURGERY

## 2025-02-26 NOTE — PROGRESS NOTES
compressible.  Tender to palpation at the medial and lateral patellar facets.  Nontender to palpation at the medial and lateral joint spaces.  Active range of motion is approximately 3 to 120 degrees of flexion.  Passively able to fully straighten the knee.  Knee does seem stable to varus and valgus stresses at 0 and 30 degrees of flexion.  Lachman Ia.  Patient does have intense pain with medial Mathieu, negative lateral Mathieu.  There is palpable grinding as she goes from flexion to extension, and extension of flexion.    Neuro: alert and oriented to person and place.   Eyes: Extra-ocular muscles intact  Mouth: Oral mucosa moist. No perioral lesions  Pulm: Respirations unlabored and regular. Symmetric chest excursion without outward deformity is noted.   Skin: warm, well perfused  Psych:   Patient has good fund of knowledge and displays understanging of exam, diagnosis, and plan.    Radiology:       Multiple views of the right knee (AP, lateral, notch, Merchant) demonstrating no acute fractures or dislocations.  There are some mild joint space narrowing appreciable at the patella femoral joint with some osteophyte formation off the lateral aspect of the patella.  The patella does appear to sit somewhat laterally in the trochlear groove.  Medial and lateral joint space appears well-preserved without osteophytes or subchondral sclerosis, nor subcortical cyst.    Assessment:      1. Patellofemoral arthritis of right knee       Plan:   Assessment & Plan   -We had a end of discussion with patient and regarding her current clinical status.  Radiographs and MRI were reviewed and discussed with the patient.  -We did discuss that based on the literature, there are no great surgical treatment options for her at this time.  We did discuss that the literature supports that knee scope, and total knee replacements have inferior outcomes to long-term physical therapy in her patient population.  We did recommend long-term

## 2025-02-26 NOTE — PATIENT INSTRUCTIONS
PATIENTIQ:  PatientIQ helps [facility] stay in touch with you to know how you're feeling, and provides education and care instructions to you at various time points.   Your answers help your care team track your progress to provide the best care possible. PatientIQ will contact you pre-op and post-op via email or text with:  Educational Videos and Care Instructions  Questionnaires About How You're Feeling    Your participation provides you valuable education and helps [facility] continue to provide quality care to all patients. Thank you

## 2025-02-27 ENCOUNTER — TELEPHONE (OUTPATIENT)
Dept: ORTHOPEDIC SURGERY | Age: 40
End: 2025-02-27

## 2025-02-27 NOTE — TELEPHONE ENCOUNTER
Patient requests a replacement PTO Knee Brace XL because she says hers is stretched out. I spoke to Frederic from [facility] and he says is he visit Note from yesterday states her Brace is damaged beyond repair that we can get her a new brace. Can you create addendum yesterdays note stating R Knee PTO XL brace is damaged beyond repair and needs a replacement?

## 2025-03-08 ASSESSMENT — ENCOUNTER SYMPTOMS
BACK PAIN: 1
CONSTIPATION: 0
NAUSEA: 0
VOMITING: 0
DIARRHEA: 0
BLOOD IN STOOL: 0
COUGH: 0
ABDOMINAL PAIN: 0
SHORTNESS OF BREATH: 0
WHEEZING: 0

## 2025-05-02 ENCOUNTER — TELEPHONE (OUTPATIENT)
Dept: PRIMARY CARE CLINIC | Age: 40
End: 2025-05-02

## 2025-05-02 NOTE — TELEPHONE ENCOUNTER
Patient called asking if you can order her  a PTO knee brace for her right knee?  She said she would like to have it before her next appointment that is on 5/13    Please advise

## 2025-07-06 ENCOUNTER — HOSPITAL ENCOUNTER (EMERGENCY)
Facility: CLINIC | Age: 40
Discharge: HOME | End: 2025-07-06
Attending: EMERGENCY MEDICINE
Payer: MEDICAID

## 2025-07-06 VITALS
OXYGEN SATURATION: 100 % | SYSTOLIC BLOOD PRESSURE: 178 MMHG | HEART RATE: 90 BPM | HEIGHT: 68 IN | BODY MASS INDEX: 37.89 KG/M2 | TEMPERATURE: 98.7 F | RESPIRATION RATE: 15 BRPM | WEIGHT: 250 LBS | DIASTOLIC BLOOD PRESSURE: 104 MMHG

## 2025-07-06 DIAGNOSIS — S00.06XA TICK BITE OF SCALP, INITIAL ENCOUNTER: Primary | ICD-10-CM
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
## 2025-07-06 PROCEDURE — 99282 EMERGENCY DEPT VISIT SF MDM: CPT

## 2025-07-06 ASSESSMENT — PAIN - FUNCTIONAL ASSESSMENT: PAIN_FUNCTIONAL_ASSESSMENT: NONE - DENIES PAIN

## 2025-07-07 NOTE — ED PROVIDER NOTES
CONSULTS:   None    CRITICAL CARE:   None    PROCEDURES:   Foreign Body    Date/Time: 7/6/2025 10:10 PM    Performed by: Thania Worley DO  Authorized by: Thania Worley DO    Consent:     Consent obtained:  Verbal    Consent given by:  Patient    Risks, benefits, and alternatives were discussed: yes      Risks discussed:  Bleeding, incomplete removal, infection, pain and worsening of condition    Alternatives discussed:  No treatment, alternative treatment, observation, referral and delayed treatment  Universal protocol:     Procedure explained and questions answered to patient or proxy's satisfaction: yes      Patient identity confirmed:  Arm band  Location:     Location:  Scalp    Scalp location:  Occipital  Pre-procedure details:     Imaging:  None    Neurovascular status: intact    Anesthesia:     Anesthesia method:  None  Procedure type:     Procedure complexity:  Simple  Procedure details:     Dissection of underlying tissues: no      Bloodless field: yes      Removal mechanism:  Forceps    Foreign bodies recovered:  1    Intact foreign body removal: yes    Post-procedure details:     Neurovascular status: intact      Confirmation:  No additional foreign bodies on visualization    Skin closure:  None    Procedure completion:  Tolerated well, no immediate complications        MEDICAL DECISION MAKING:   The patient is a 40-year-old female who presents for evaluation of a tick that is embedded in her right occipital scalp.  Vital signs are stable except for elevated blood pressure.  She has asymptomatic hypertension at this time was told to follow-up with her PCP for further evaluation and treatment of her blood pressure.  The tick was easily removed with forceps and was intact.  Since the tick was embedded for less than 36 hours, it is a dog tick, was not engorged, did not produce a rash, and was successfully removed, I do not feel that the patient needs to be given antibiotics at this time.

## 2025-07-07 NOTE — DISCHARGE INSTRUCTIONS
Because the tick was attached for less than 36 hours and there were no concerning findings, I do not think we need to treat you with antibiotics at this time.     For pain use acetaminophen (Tylenol) or ibuprofen (Motrin / Advil), unless prescribed medications that have acetaminophen or ibuprofen (or similar medications) in it.  You can take over the counter acetaminophen tablets (1 - 2 tablets of the 500-mg strength every 6 hours) or ibuprofen tablets (2 tablets every 4 hours).    PLEASE RETURN TO THE EMERGENCY DEPARTMENT IMMEDIATELY for worsening symptoms, white drainage from the wound, redness or streaking, or if you develop any concerning symptoms such as: target lesions, high fever not relieved by acetaminophen (Tylenol) and/or ibuprofen (Motrin / Advil), chills, shortness of breath, chest pain, feeling of your heart fluttering or racing, persistent nausea and/or vomiting, vomiting up blood, blood in your stool, loss of consciousness, numbness, weakness or tingling in the arms or legs or change in color of the extremities, changes in mental status, persistent headache, blurry vision, loss of bladder / bowel control, unable to follow up with your physician, or other any other care or concern.

## 2025-07-07 NOTE — ED NOTES
Pt presents to ED ambulatory a&o x4. Pt states yesterday she noticed tick on the back of her scalp. Pt states it feels itchy.

## (undated) DEVICE — TROCAR: Brand: KII FIOS FIRST ENTRY

## (undated) DEVICE — GLOVE SURG SZ 6 THK91MIL LTX FREE SYN POLYISOPRENE ANTI

## (undated) DEVICE — SEALER/DIVIDER LAP SHFT L37CM JAW APER 11.4MM 315DEG ROT

## (undated) DEVICE — ELECTRODE PT RET AD L9FT HI MOIST COND ADH HYDRGEL CORDED

## (undated) DEVICE — APPLICATOR MEDICATED 26 CC SOLUTION HI LT ORNG CHLORAPREP

## (undated) DEVICE — SURGICEL ENDOSCP APPL

## (undated) DEVICE — CYSTO/BLADDER IRRIGATION SET, REGULATING CLAMP

## (undated) DEVICE — COUNTER NDL 10 COUNT HLD 20 FOAM BLK SGL MAG

## (undated) DEVICE — DRAPE,REIN 53X77,STERILE: Brand: MEDLINE

## (undated) DEVICE — AIRSEAL 5 MM ACCESS PORT AND LOW PROFILE OBTURATOR WITH BLADELESS OPTICAL TIP, 120 MM LENGTH: Brand: AIRSEAL

## (undated) DEVICE — CONTAINER,SPECIMEN,4OZ,OR STRL: Brand: MEDLINE

## (undated) DEVICE — SUTURE MCRYL SZ 4-0 L18IN ABSRB UD L16MM PC-3 3/8 CIR PRIM Y845G

## (undated) DEVICE — SUTURE VCRL SZ 0 L27IN ABSRB UD L36MM CT-1 1/2 CIR J260H

## (undated) DEVICE — PAD,SANITARY,11 IN,MAXI,W/WINGS,N-STRL: Brand: MEDLINE

## (undated) DEVICE — TUBING, SUCTION, 9/32" X 20', STRAIGHT: Brand: MEDLINE INDUSTRIES, INC.

## (undated) DEVICE — GLOVE ORANGE PI 7   MSG9070

## (undated) DEVICE — GOWN,AURORA,NONREINFORCED,LARGE: Brand: MEDLINE

## (undated) DEVICE — SYRINGE, LUER LOCK, 10ML: Brand: MEDLINE

## (undated) DEVICE — SOLUTION ANTIFOG VIS SYS CLEARIFY LAPSCP

## (undated) DEVICE — DRAPE,UNDRBUT,WHT GRAD PCH,CAPPORT,20/CS: Brand: MEDLINE

## (undated) DEVICE — AGENT HEMSTAT 3GM OXIDIZED REGENERATED CELOS ABSRB FOR CONT (ORDER MULTIPLES OF 5EA)

## (undated) DEVICE — COVER XR CASS W24XL25IN CLR POLY FLM DRAPEABLE W REL LNR

## (undated) DEVICE — LEGGINGS, PAIR, 31X48, STERILE: Brand: MEDLINE

## (undated) DEVICE — PAD PT POS 36 IN SURGYPAD DISP

## (undated) DEVICE — GLOVE SURG SZ 65 THK91MIL LTX FREE SYN POLYISOPRENE

## (undated) DEVICE — GLOVE ORANGE PI 8   MSG9080

## (undated) DEVICE — GAUZE,SPONGE,4"X4",16PLY,XRAY,STRL,LF: Brand: MEDLINE

## (undated) DEVICE — SCISSOR SURG METZ CRV TIP

## (undated) DEVICE — DEVICE TRCR 12X9X3IN WHT CLSR DISP OMNICLOSE

## (undated) DEVICE — TRI-LUMEN FILTERED TUBE SET WITH ACTIVATED CHARCOAL FILTER: Brand: AIRSEAL

## (undated) DEVICE — SUTURE V-LOC 180 SZ 0 L9IN ABSRB GRN GS-21 L37MM 1/2 CIR VLOCL0346

## (undated) DEVICE — Device

## (undated) DEVICE — TROCAR: Brand: KII SLEEVE

## (undated) DEVICE — SUTURE VCRL + SZ 0 L27IN ABSRB VLT L26MM UR-6 5/8 CIR VCP603H

## (undated) DEVICE — SOLUTION SCRB 4OZ 4% CHG H2O AIDED FOR PREOPERATIVE SKIN

## (undated) DEVICE — COVER OR TBL W40XL90IN ABSRB STD AND GRIPPY BK SAHARA

## (undated) DEVICE — GOWN,SIRUS,NONRNF,SETINSLV,XL,20/CS: Brand: MEDLINE

## (undated) DEVICE — Device: Brand: UTERINE ELEVATOR PRO

## (undated) DEVICE — DECANTER BAG 9": Brand: MEDLINE INDUSTRIES, INC.

## (undated) DEVICE — ADHESIVE SKIN CLOSURE TOP 36 CC HI VISC DERMBND MINI

## (undated) DEVICE — PROTECTOR ULN NRV PUR FOAM HK LOOP STRP ANATOMICALLY

## (undated) DEVICE — UNDERPANTS MAT L XL SEAMLESS CLR CODE WAISTBAND KNIT

## (undated) DEVICE — PACK LAP BASIC

## (undated) DEVICE — DRESSING TRNSPAR W2XL2.75IN FLM SHT SEMIPERMEABLE WIND

## (undated) DEVICE — PAD,NON-ADHERENT,3X8,STERILE,LF,1/PK: Brand: MEDLINE